# Patient Record
Sex: FEMALE | Race: BLACK OR AFRICAN AMERICAN | Employment: PART TIME | URBAN - METROPOLITAN AREA
[De-identification: names, ages, dates, MRNs, and addresses within clinical notes are randomized per-mention and may not be internally consistent; named-entity substitution may affect disease eponyms.]

---

## 2017-01-23 ENCOUNTER — APPOINTMENT (OUTPATIENT)
Dept: ULTRASOUND IMAGING | Age: 27
End: 2017-01-23
Attending: PHYSICIAN ASSISTANT
Payer: COMMERCIAL

## 2017-01-23 ENCOUNTER — HOSPITAL ENCOUNTER (EMERGENCY)
Age: 27
Discharge: HOME | End: 2017-01-24
Attending: EMERGENCY MEDICINE | Admitting: EMERGENCY MEDICINE
Payer: COMMERCIAL

## 2017-01-23 VITALS
RESPIRATION RATE: 18 BRPM | SYSTOLIC BLOOD PRESSURE: 121 MMHG | OXYGEN SATURATION: 100 % | HEART RATE: 62 BPM | BODY MASS INDEX: 30 KG/M2 | HEIGHT: 67 IN | WEIGHT: 191.14 LBS | DIASTOLIC BLOOD PRESSURE: 86 MMHG | TEMPERATURE: 98.9 F

## 2017-01-23 DIAGNOSIS — N76.0 BV (BACTERIAL VAGINOSIS): ICD-10-CM

## 2017-01-23 DIAGNOSIS — R10.2 PELVIC PAIN: Primary | ICD-10-CM

## 2017-01-23 DIAGNOSIS — B96.89 BV (BACTERIAL VAGINOSIS): ICD-10-CM

## 2017-01-23 DIAGNOSIS — R11.2 NON-INTRACTABLE VOMITING WITH NAUSEA, UNSPECIFIED VOMITING TYPE: ICD-10-CM

## 2017-01-23 LAB
ALBUMIN SERPL BCP-MCNC: 3.4 G/DL (ref 3.5–5)
ALBUMIN/GLOB SERPL: 1 {RATIO} (ref 1.1–2.2)
ALP SERPL-CCNC: 64 U/L (ref 45–117)
ALT SERPL-CCNC: 17 U/L (ref 12–78)
ANION GAP BLD CALC-SCNC: 6 MMOL/L (ref 5–15)
APPEARANCE UR: ABNORMAL
AST SERPL W P-5'-P-CCNC: 11 U/L (ref 15–37)
BACTERIA URNS QL MICRO: ABNORMAL /HPF
BILIRUB SERPL-MCNC: 0.6 MG/DL (ref 0.2–1)
BILIRUB UR QL: NEGATIVE
BUN SERPL-MCNC: 9 MG/DL (ref 6–20)
BUN/CREAT SERPL: 12 (ref 12–20)
CALCIUM SERPL-MCNC: 7.9 MG/DL (ref 8.5–10.1)
CHLORIDE SERPL-SCNC: 109 MMOL/L (ref 97–108)
CLUE CELLS VAG QL WET PREP: NORMAL
CO2 SERPL-SCNC: 28 MMOL/L (ref 21–32)
COLOR UR: ABNORMAL
CREAT SERPL-MCNC: 0.76 MG/DL (ref 0.55–1.02)
EPITH CASTS URNS QL MICRO: ABNORMAL /LPF
ERYTHROCYTE [DISTWIDTH] IN BLOOD BY AUTOMATED COUNT: 12.6 % (ref 11.5–14.5)
GLOBULIN SER CALC-MCNC: 3.3 G/DL (ref 2–4)
GLUCOSE SERPL-MCNC: 96 MG/DL (ref 65–100)
GLUCOSE UR STRIP.AUTO-MCNC: NEGATIVE MG/DL
HCG UR QL: NEGATIVE
HCT VFR BLD AUTO: 37.5 % (ref 35–47)
HGB BLD-MCNC: 12.2 G/DL (ref 11.5–16)
HGB UR QL STRIP: ABNORMAL
KETONES UR QL STRIP.AUTO: NEGATIVE MG/DL
KOH PREP SPEC: NORMAL
LEUKOCYTE ESTERASE UR QL STRIP.AUTO: ABNORMAL
MCH RBC QN AUTO: 27.5 PG (ref 26–34)
MCHC RBC AUTO-ENTMCNC: 32.5 G/DL (ref 30–36.5)
MCV RBC AUTO: 84.5 FL (ref 80–99)
MUCOUS THREADS URNS QL MICRO: ABNORMAL /LPF
NITRITE UR QL STRIP.AUTO: NEGATIVE
PH UR STRIP: 6.5 [PH] (ref 5–8)
PLATELET # BLD AUTO: 272 K/UL (ref 150–400)
POTASSIUM SERPL-SCNC: 3.8 MMOL/L (ref 3.5–5.1)
PROT SERPL-MCNC: 6.7 G/DL (ref 6.4–8.2)
PROT UR STRIP-MCNC: NEGATIVE MG/DL
RBC # BLD AUTO: 4.44 M/UL (ref 3.8–5.2)
RBC #/AREA URNS HPF: ABNORMAL /HPF (ref 0–5)
SERVICE CMNT-IMP: NORMAL
SODIUM SERPL-SCNC: 143 MMOL/L (ref 136–145)
SP GR UR REFRACTOMETRY: 1.02 (ref 1–1.03)
T VAGINALIS VAG QL WET PREP: NORMAL
UA: UC IF INDICATED,UAUC: ABNORMAL
UROBILINOGEN UR QL STRIP.AUTO: 1 EU/DL (ref 0.2–1)
WBC # BLD AUTO: 7.3 K/UL (ref 3.6–11)
WBC URNS QL MICRO: ABNORMAL /HPF (ref 0–4)

## 2017-01-23 PROCEDURE — 74011250637 HC RX REV CODE- 250/637: Performed by: PHYSICIAN ASSISTANT

## 2017-01-23 PROCEDURE — 36415 COLL VENOUS BLD VENIPUNCTURE: CPT | Performed by: EMERGENCY MEDICINE

## 2017-01-23 PROCEDURE — 87086 URINE CULTURE/COLONY COUNT: CPT | Performed by: EMERGENCY MEDICINE

## 2017-01-23 PROCEDURE — 87491 CHLMYD TRACH DNA AMP PROBE: CPT | Performed by: PHYSICIAN ASSISTANT

## 2017-01-23 PROCEDURE — 99284 EMERGENCY DEPT VISIT MOD MDM: CPT

## 2017-01-23 PROCEDURE — 81001 URINALYSIS AUTO W/SCOPE: CPT | Performed by: EMERGENCY MEDICINE

## 2017-01-23 PROCEDURE — 87210 SMEAR WET MOUNT SALINE/INK: CPT | Performed by: PHYSICIAN ASSISTANT

## 2017-01-23 PROCEDURE — 76856 US EXAM PELVIC COMPLETE: CPT

## 2017-01-23 PROCEDURE — 76830 TRANSVAGINAL US NON-OB: CPT

## 2017-01-23 PROCEDURE — 81025 URINE PREGNANCY TEST: CPT

## 2017-01-23 PROCEDURE — 85027 COMPLETE CBC AUTOMATED: CPT | Performed by: EMERGENCY MEDICINE

## 2017-01-23 PROCEDURE — 80053 COMPREHEN METABOLIC PANEL: CPT | Performed by: EMERGENCY MEDICINE

## 2017-01-23 RX ORDER — HYDROCODONE BITARTRATE AND ACETAMINOPHEN 7.5; 325 MG/1; MG/1
1 TABLET ORAL
Status: COMPLETED | OUTPATIENT
Start: 2017-01-23 | End: 2017-01-23

## 2017-01-23 RX ORDER — ONDANSETRON 4 MG/1
4 TABLET, ORALLY DISINTEGRATING ORAL
Status: COMPLETED | OUTPATIENT
Start: 2017-01-23 | End: 2017-01-23

## 2017-01-23 RX ADMIN — HYDROCODONE BITARTRATE AND ACETAMINOPHEN 1 TABLET: 7.5; 325 TABLET ORAL at 22:48

## 2017-01-23 RX ADMIN — ONDANSETRON 4 MG: 4 TABLET, ORALLY DISINTEGRATING ORAL at 22:48

## 2017-01-23 NOTE — LETTER
1/27/2017 Sofia Es [phone] [id number] Dear Ms. Jeffrey Napoles, You were recently seen in the Emergency Department of [address] [place] [address] and had lab work performed. We would like to discuss these results with you. Please call the Emergency Department at your earliest convenience at [phone] between 10am-8pm to speak with one of our providers. Sincerely, Kaylyn Pemberton, [address] 
 
 
[facility] EMERGENCY DEPT 
[address] [place] [address] 
[phone]

## 2017-01-24 RX ORDER — METRONIDAZOLE 500 MG/1
500 TABLET ORAL 2 TIMES DAILY
Qty: 14 TAB | Refills: 0 | Status: SHIPPED | OUTPATIENT
Start: 2017-01-24 | End: 2017-01-31

## 2017-01-24 RX ORDER — ONDANSETRON 4 MG/1
4 TABLET, ORALLY DISINTEGRATING ORAL
Qty: 10 TAB | Refills: 0 | Status: SHIPPED | OUTPATIENT
Start: 2017-01-24 | End: 2017-04-26

## 2017-01-24 NOTE — DISCHARGE INSTRUCTIONS
Bacterial Vaginosis: Care Instructions  Your Care Instructions    Bacterial vaginosis is a type of vaginal infection. It is caused by excess growth of certain bacteria that are normally found in the vagina. Symptoms can include itching, swelling, pain when you urinate or have sex, and a gray or yellow discharge with a \"fishy\" odor. It is not considered an infection that is spread through sexual contact. Although symptoms can be annoying and uncomfortable, bacterial vaginosis does not usually cause other health problems. However, if you have it while you are pregnant, it can cause complications. While the infection may go away on its own, most doctors use antibiotics to treat it. You may have been prescribed pills or vaginal cream. With treatment, bacterial vaginosis usually clears up in 5 to 7 days. Follow-up care is a key part of your treatment and safety. Be sure to make and go to all appointments, and call your doctor if you are having problems. It's also a good idea to know your test results and keep a list of the medicines you take. How can you care for yourself at home? · Take your antibiotics as directed. Do not stop taking them just because you feel better. You need to take the full course of antibiotics. · Do not eat or drink anything that contains alcohol if you are taking metronidazole (Flagyl). · Keep using your medicine if you start your period. Use pads instead of tampons while using a vaginal cream or suppository. Tampons can absorb the medicine. · Wear loose cotton clothing. Do not wear nylon and other materials that hold body heat and moisture close to the skin. · Do not scratch. Relieve itching with a cold pack or a cool bath. · Do not wash your vaginal area more than once a day. Use plain water or a mild, unscented soap. Do not douche. When should you call for help?   Watch closely for changes in your health, and be sure to contact your doctor if:  · You have unexpected vaginal bleeding. · You have a fever. · You have new or increased pain in your vagina or pelvis. · You are not getting better after 1 week. · Your symptoms return after you finish the course of your medicine. Where can you learn more? Go to http://rosemary-rose.info/. Jair Nell in the search box to learn more about \"Bacterial Vaginosis: Care Instructions. \"  Current as of: February 25, 2016  Content Version: 11.1  © 0962-5292 [employer]. Care instructions adapted under license by [email or web address] (which disclaims liability or warranty for this information). If you have questions about a medical condition or this instruction, always ask your healthcare professional. [address] any warranty or liability for your use of this information. Pelvic Pain: Care Instructions  Your Care Instructions    Pelvic pain, or pain in the lower belly, can have many causes. Often pelvic pain is not serious and gets better in a few days. If your pain continues or gets worse, you may need tests and treatment. Tell your doctor about any new symptoms. These may be signs of a serious problem. Follow-up care is a key part of your treatment and safety. Be sure to make and go to all appointments, and call your doctor if you are having problems. It's also a good idea to know your test results and keep a list of the medicines you take. How can you care for yourself at home? · Rest until you feel better. Lie down, and raise your legs by placing a pillow under your knees. · Drink plenty of fluids. You may find that small, frequent sips are easier on your stomach than if you drink a lot at once. Avoid drinks with carbonation or caffeine, such as soda pop, tea, or coffee. · Try eating several small meals instead of 2 or 3 large ones. Eat mild foods, such as rice, dry toast or crackers, bananas, and applesauce.  Avoid fatty and spicy foods, other fruits, and alcohol until 48 hours after your symptoms have gone away. · Take an over-the-counter pain medicine, such as acetaminophen (Tylenol), ibuprofen (Advil, Motrin), or naproxen (Aleve). Read and follow all instructions on the label. · Do not take two or more pain medicines at the same time unless the doctor told you to. Many pain medicines have acetaminophen, which is Tylenol. Too much acetaminophen (Tylenol) can be harmful. · You can put a heating pad, a warm cloth, or moist heat on your belly to relieve pain. When should you call for help? Call 911 anytime you think you may need emergency care. For example, call if:  · You passed out (lost consciousness). Call your doctor now or seek immediate medical care if:  · Your pain is getting worse. · Your pain becomes focused in one area of your belly. · You have severe vaginal bleeding. Severe means that you are soaking through your usual pads or tampons every hour for 2 or more hours and passing clots of blood. · You have new symptoms such as fever, urinary problems or unexpected vaginal bleeding. · You are dizzy or lightheaded, or you feel like you may faint. Watch closely for changes in your health, and be sure to contact your doctor if:  · You do not get better as expected. Where can you learn more? Go to http://rosemary-rose.info/. Enter [phone] in the search box to learn more about \"Pelvic Pain: Care Instructions. \"  Current as of: February 25, 2016  Content Version: 11.1  © 0776-3600 [employer]. Care instructions adapted under license by [employer] (which disclaims liability or warranty for this information). If you have questions about a medical condition or this instruction, always ask your healthcare professional. [address] any warranty or liability for your use of this information.

## 2017-01-24 NOTE — ED PROVIDER NOTES
HPI Comments: Alin Lara is a 27 y.o. female presents to emergency room ambulatory c/o pelvic pain, nausea with vomiting x 1 this morning. Denies upper abdominal pain, bowel or bladder symptoms, F/C, diarrhea, CP, SOB, vaginal discharge or odor, dysuria. Denies concerns for STI's. PCP: None    LMP- started 1/21, on presently    PMH- costochondritis  Social hx- no tobacco    The patient has no other complaints at this time. Written by Shanta Keller PA-C      The history is provided by the patient. Past Medical History:   Diagnosis Date    Costochondritis        Past Surgical History:   Procedure Laterality Date    Hx orthopaedic       Right fourth finger, \"Digit widgit\"         History reviewed. No pertinent family history. Social History     Social History    Marital status: SINGLE     Spouse name: N/A    Number of children: N/A    Years of education: N/A     Occupational History    Not on file. Social History Main Topics    Smoking status: Never Smoker    Smokeless tobacco: Not on file    Alcohol use Yes    Drug use: No    Sexual activity: No     Other Topics Concern    Not on file     Social History Narrative         ALLERGIES: Motrin [ibuprofen]    Review of Systems   Constitutional: Negative. Negative for activity change, chills, fatigue and unexpected weight change. Respiratory: Negative for cough, chest tightness, shortness of breath and wheezing. Cardiovascular: Negative. Negative for chest pain and palpitations. Gastrointestinal: Positive for nausea and vomiting. Negative for abdominal pain and diarrhea. Genitourinary: Negative. Negative for dysuria, flank pain, frequency and hematuria. Musculoskeletal: Negative. Negative for arthralgias, back pain, neck pain and neck stiffness. Skin: Negative. Negative for color change and rash. Neurological: Negative. Negative for dizziness, numbness and headaches. Psychiatric/Behavioral: Negative. Negative for confusion. All other systems reviewed and are negative. Patient Vitals for the past 12 hrs:   Temp Pulse Resp BP SpO2   01/23/17 1907 - 62 18 121/86 100 %   01/23/17 1758 98.9 °F (37.2 °C) (!) 57 16 127/85 100 %            Physical Exam   Constitutional: She is oriented to person, place, and time. She appears well-developed and well-nourished. She is active. Non-toxic appearance. No distress. AA female, looking at cell phone in no distress   HENT:   Head: Normocephalic and atraumatic. Eyes: Conjunctivae are normal. Pupils are equal, round, and reactive to light. Right eye exhibits no discharge. Left eye exhibits no discharge. Neck: Normal range of motion and full passive range of motion without pain. Neck supple. No tracheal tenderness present. Cardiovascular: Normal rate, regular rhythm, normal heart sounds, intact distal pulses and normal pulses. Exam reveals no gallop and no friction rub. No murmur heard. Pulmonary/Chest: Effort normal and breath sounds normal. No respiratory distress. She has no wheezes. She has no rales. She exhibits no tenderness. Abdominal: Soft. Bowel sounds are normal. She exhibits no distension. There is tenderness (suprapubic TTP; no lower quadrant TTP; no CVAT). There is no rebound and no guarding. Genitourinary:   Genitourinary Comments: Normal external genitalia. Vagina- pink, moist without lesion. Scant maroon colored blood in vaginal vault. Cervix- pink, round with a closed cervical os. Mild adnexal TTP with no CMT. No adnexal TTP or masses. Musculoskeletal: Normal range of motion. She exhibits no edema or tenderness. Neurological: She is alert and oriented to person, place, and time. She has normal strength. No cranial nerve deficit or sensory deficit. Coordination normal.   Skin: Skin is warm, dry and intact. No abrasion and no rash noted. She is not diaphoretic. No erythema. Psychiatric: She has a normal mood and affect.  Her speech is normal and behavior is normal. Cognition and memory are normal.   Nursing note and vitals reviewed. MDM  Number of Diagnoses or Management Options  Diagnosis management comments:   Ddx: STI, PID, BV, UTI, pregnancy       Amount and/or Complexity of Data Reviewed  Clinical lab tests: ordered and reviewed  Review and summarize past medical records: yes      ED Course       Procedures    Procedure Note - Pelvic Exam:    10:29 PM  Performed by: Catalina Wilson  Pelvic exam was performed using bimanual and speculum. Further findings noted in physical exam.   The procedure took 1-15 minutes, and pt tolerated well. Declines offer for empiric treatment against STDs at this time, will await cultures. Eddie Goodrich PA-C         DISCHARGE NOTE:  12:21 AM  The patient's results have been reviewed with them and/or available family. Patient and/or family verbally conveyed their understanding and agreement of the patient's signs, symptoms, diagnosis, treatment and prognosis and additionally agree to follow up as recommended in the discharge instructions or to return to the Emergency Room should their condition change prior to their follow-up appointment. The patient/family verbally agrees with the care-plan and verbally conveys that all of their questions have been answered. The discharge instructions have also been provided to the patient and/or family with some educational information regarding the patient's diagnosis as well a list of reasons why the patient would want to return to the ER prior to their follow-up appointment, should their condition change.   Written by Eddie Goodrich PA-C    LABS COMPLETED AND REVIEWED:  Recent Results (from the past 12 hour(s))   CBC W/O DIFF    Collection Time: 01/23/17  6:24 PM   Result Value Ref Range    WBC 7.3 3.6 - 11.0 K/uL    RBC 4.44 3.80 - 5.20 M/uL    HGB 12.2 11.5 - 16.0 g/dL    HCT 37.5 35.0 - 47.0 %    MCV 84.5 80.0 - 99.0 FL    MCH 27.5 26.0 - 34.0 PG MCHC 32.5 30.0 - 36.5 g/dL    RDW 12.6 11.5 - 14.5 %    PLATELET 791 966 - 510 K/uL   METABOLIC PANEL, COMPREHENSIVE    Collection Time: 01/23/17  6:24 PM   Result Value Ref Range    Sodium 143 136 - 145 mmol/L    Potassium 3.8 3.5 - 5.1 mmol/L    Chloride 109 (H) 97 - 108 mmol/L    CO2 28 21 - 32 mmol/L    Anion gap 6 5 - 15 mmol/L    Glucose 96 65 - 100 mg/dL    BUN 9 6 - 20 MG/DL    Creatinine 0.76 0.55 - 1.02 MG/DL    BUN/Creatinine ratio 12 12 - 20      GFR est AA >60 >60 ml/min/1.73m2    GFR est non-AA >60 >60 ml/min/1.73m2    Calcium 7.9 (L) 8.5 - 10.1 MG/DL    Bilirubin, total 0.6 0.2 - 1.0 MG/DL    ALT 17 12 - 78 U/L    AST 11 (L) 15 - 37 U/L    Alk.  phosphatase 64 45 - 117 U/L    Protein, total 6.7 6.4 - 8.2 g/dL    Albumin 3.4 (L) 3.5 - 5.0 g/dL    Globulin 3.3 2.0 - 4.0 g/dL    A-G Ratio 1.0 (L) 1.1 - 2.2     URINALYSIS W/ REFLEX CULTURE    Collection Time: 01/23/17  9:34 PM   Result Value Ref Range    Color YELLOW/STRAW      Appearance CLOUDY (A) CLEAR      Specific gravity 1.023 1.003 - 1.030      pH (UA) 6.5 5.0 - 8.0      Protein NEGATIVE  NEG mg/dL    Glucose NEGATIVE  NEG mg/dL    Ketone NEGATIVE  NEG mg/dL    Bilirubin NEGATIVE  NEG      Blood LARGE (A) NEG      Urobilinogen 1.0 0.2 - 1.0 EU/dL    Nitrites NEGATIVE  NEG      Leukocyte Esterase SMALL (A) NEG      WBC 5-10 0 - 4 /hpf    RBC 5-10 0 - 5 /hpf    Epithelial cells MANY (A) FEW /lpf    Bacteria 1+ (A) NEG /hpf    UA:UC IF INDICATED URINE CULTURE ORDERED (A) CNI      Mucus TRACE (A) NEG /lpf   HCG URINE, QL. - POC    Collection Time: 01/23/17  9:38 PM   Result Value Ref Range    Pregnancy test,urine (POC) NEGATIVE  NEG     KOH, OTHER SOURCES    Collection Time: 01/23/17 10:20 PM   Result Value Ref Range    Special Requests: NO SPECIAL REQUESTS      KOH NO YEAST SEEN     WET PREP    Collection Time: 01/23/17 10:30 PM   Result Value Ref Range    Clue cells CLUE CELLS PRESENT      Wet prep NO TRICHOMONAS SEEN         IMAGING COMPLETED AND REVIEWED:  US PELV NON OBS   US TRANSVAGINAL   Final Result   INDICATION: vomiting; r/o torsion, eval for cyst      PELVIC SONOGRAM is performed through the unremarkable urinary bladder.     The uterus is normal in size, contour and echotexture with no evidence for  fibroids. Uterus measures approximately 8.2 x 5.2 x 3.3 cm. Endometrial stripe  is not significantly thickened, measuring 6 mm by this technique.     Ovaries are obscured by bowel gas. There is no significant free fluid in the  pelvic cul-de-sac.     TRANSVAGINAL SONOGRAM is performed following Pelvic Sonogram in order to more  accurately measure the endometrial thickness and to more adequately delineate  ovarian anatomy.     The uterus is normal in echotexture with no evidence for fibroids. Endometrial  stripe measures 2mm.      Ovaries are normal in appearance and vascularity by transvaginal sonography with  no adnexal dominant cyst or solid mass identified. Right ovary measures 3.6 x  2.8 x 2.5cm and left ovary 3.6 x 2.2 x 2.0cm.      IMPRESSION  IMPRESSION: Normal Pelvic and Transvaginal Sonograms. CLINICAL IMPRESSION:  1. Pelvic pain    2. Non-intractable vomiting with nausea, unspecified vomiting type    3. BV (bacterial vaginosis)        Plan:  1. Rx Flagyl, Zofran ODT  2.  Pelvic rest encouraged  3.   4. Return to ER if worse

## 2017-01-25 LAB
BACTERIA SPEC CULT: NORMAL
C TRACH DNA SPEC QL NAA+PROBE: POSITIVE
CC UR VC: NORMAL
N GONORRHOEA DNA SPEC QL NAA+PROBE: NEGATIVE
SAMPLE TYPE: ABNORMAL
SERVICE CMNT-IMP: ABNORMAL
SERVICE CMNT-IMP: NORMAL
SPECIMEN SOURCE: ABNORMAL

## 2017-01-25 NOTE — PROGRESS NOTES
Patient not treated while in ED. Attempted to contact patient but unable to hear patient on phone and unable to leave message. Needs another call.

## 2017-01-25 NOTE — PROGRESS NOTES
Attempted to call patient again today and was able to leave a VM instructing her to call back without revealing any private health identifiers. Did not receive treatment for Chlamydia while in [id number] [facility] ED.   Franssico De León PA-C

## 2017-01-30 NOTE — PROGRESS NOTES
Patient called back to the ED, verified SSN. Discussed results. Patient can not afford abx, request to return to ED for treatment.    Jose Samayoa, [address]

## 2017-04-26 ENCOUNTER — HOSPITAL ENCOUNTER (EMERGENCY)
Age: 27
Discharge: HOME | End: 2017-04-26
Attending: EMERGENCY MEDICINE
Payer: COMMERCIAL

## 2017-04-26 VITALS
DIASTOLIC BLOOD PRESSURE: 73 MMHG | SYSTOLIC BLOOD PRESSURE: 115 MMHG | TEMPERATURE: 98.4 F | HEART RATE: 71 BPM | BODY MASS INDEX: 31.11 KG/M2 | WEIGHT: 198.19 LBS | OXYGEN SATURATION: 100 % | RESPIRATION RATE: 18 BRPM | HEIGHT: 67 IN

## 2017-04-26 DIAGNOSIS — N76.0 BACTERIAL VAGINOSIS: ICD-10-CM

## 2017-04-26 DIAGNOSIS — Z20.2 STD EXPOSURE: Primary | ICD-10-CM

## 2017-04-26 DIAGNOSIS — B96.89 BACTERIAL VAGINOSIS: ICD-10-CM

## 2017-04-26 LAB
APPEARANCE UR: CLEAR
BACTERIA URNS QL MICRO: NEGATIVE /HPF
BILIRUB UR QL: NEGATIVE
CLUE CELLS VAG QL WET PREP: NORMAL
COLOR UR: ABNORMAL
EPITH CASTS URNS QL MICRO: ABNORMAL /LPF
GLUCOSE UR STRIP.AUTO-MCNC: NEGATIVE MG/DL
HCG UR QL: NEGATIVE
HGB UR QL STRIP: NEGATIVE
KETONES UR QL STRIP.AUTO: NEGATIVE MG/DL
KOH PREP SPEC: NORMAL
LEUKOCYTE ESTERASE UR QL STRIP.AUTO: ABNORMAL
NITRITE UR QL STRIP.AUTO: NEGATIVE
PH UR STRIP: 5.5 [PH] (ref 5–8)
PROT UR STRIP-MCNC: NEGATIVE MG/DL
RBC #/AREA URNS HPF: ABNORMAL /HPF (ref 0–5)
SERVICE CMNT-IMP: NORMAL
SP GR UR REFRACTOMETRY: 1.02 (ref 1–1.03)
T VAGINALIS VAG QL WET PREP: NORMAL
UROBILINOGEN UR QL STRIP.AUTO: 0.2 EU/DL (ref 0.2–1)
WBC URNS QL MICRO: ABNORMAL /HPF (ref 0–4)

## 2017-04-26 PROCEDURE — 87210 SMEAR WET MOUNT SALINE/INK: CPT | Performed by: EMERGENCY MEDICINE

## 2017-04-26 PROCEDURE — 74011250637 HC RX REV CODE- 250/637: Performed by: EMERGENCY MEDICINE

## 2017-04-26 PROCEDURE — 81001 URINALYSIS AUTO W/SCOPE: CPT | Performed by: EMERGENCY MEDICINE

## 2017-04-26 PROCEDURE — 74011000250 HC RX REV CODE- 250: Performed by: EMERGENCY MEDICINE

## 2017-04-26 PROCEDURE — 99284 EMERGENCY DEPT VISIT MOD MDM: CPT

## 2017-04-26 PROCEDURE — 96372 THER/PROPH/DIAG INJ SC/IM: CPT

## 2017-04-26 PROCEDURE — 87491 CHLMYD TRACH DNA AMP PROBE: CPT | Performed by: EMERGENCY MEDICINE

## 2017-04-26 PROCEDURE — 81025 URINE PREGNANCY TEST: CPT

## 2017-04-26 PROCEDURE — 74011250636 HC RX REV CODE- 250/636: Performed by: EMERGENCY MEDICINE

## 2017-04-26 RX ORDER — METRONIDAZOLE 500 MG/1
500 TABLET ORAL 2 TIMES DAILY
Qty: 14 TAB | Refills: 0 | Status: SHIPPED | OUTPATIENT
Start: 2017-04-26 | End: 2017-05-03

## 2017-04-26 RX ORDER — AZITHROMYCIN 250 MG/1
1000 TABLET, FILM COATED ORAL
Status: COMPLETED | OUTPATIENT
Start: 2017-04-26 | End: 2017-04-26

## 2017-04-26 RX ORDER — CEFTRIAXONE 250 MG/8ML
250 INJECTION, POWDER, FOR SOLUTION INTRAMUSCULAR; INTRAVENOUS
Status: DISCONTINUED | OUTPATIENT
Start: 2017-04-26 | End: 2017-04-26

## 2017-04-26 RX ADMIN — AZITHROMYCIN 1000 MG: 250 TABLET, FILM COATED ORAL at 07:37

## 2017-04-26 RX ADMIN — LIDOCAINE HYDROCHLORIDE 250 MG: 10 INJECTION, SOLUTION EPIDURAL; INFILTRATION; INTRACAUDAL; PERINEURAL at 07:38

## 2017-04-26 NOTE — DISCHARGE INSTRUCTIONS
Learning About Safer Sex for Teens  What is safer sex? Safer sex is a way to help you avoid an infection spread through sex. It can also help prevent pregnancy. It may seems strange or uncomfortable to talk about sex. But the more you know, the safer you are. And the actions you take before sex can help keep you from getting an infection like herpes or a deadly infection like HIV. You can get a sexually transmitted infection (STI) from any kind of sexual contact, not just intercourse. STIs are spread through skin-to-skin contact between the genitals. You can also get an STI from contact with body fluids such as semen, vaginal fluids, and blood (including menstrual blood). This means you can get an STI from vaginal sex, anal sex, or oral sex. You may have heard this before, but not having sex at all is still the best way to prevent pregnancy and any STI. How can you protect yourself from STIs? A condom is one of the best ways to lower your chance of STIs. You may know about condoms for men. Did you know there are condoms for women too? The female condom is a tube of soft plastic with a closed end that is put deep into the vagina. · Use condoms or female condoms each time and every time you have sex. ¨ Condoms come in several sizes. Make sure you use the right size. A condom that is too small can break easily. A condom that is too big can slip off during sex. Use a new condom each time you have sex. ¨ Be careful not to poke a hole in the condom when you open the wrapper. ¨ Never use petroleum jelly (such as Vaseline), grease, hand lotion, baby oil, or anything with oil in it. These products can make holes in the condom. ¨ After sex, hold the condom on your penis as you remove your penis from your partner. This will keep semen from spilling out of the condom. · Do not use a female condom and male condom at the same time.   · Do not have sex with anyone who has symptoms of an STI, such as sores on the genitals or mouth. The herpes virus that causes cold sores can spread to and from the penis and vagina. · Think about getting shots to prevent hepatitis A and hepatitis B, if you haven't already had these shots. You can get both of these diseases through sex. A dental dam is a special rubber sheet that you can use for protection during oral sex. How can you prevent pregnancy? Remember that birth control methods such as diaphragms, IUDs, foams, and birth control pills do not stop you from getting STIs. These are some safer sex things you can do to help avoid pregnancy:  · Use some type of birth control every time you have sex. · Don't drink a lot of alcohol or use drugs before sex. This can cause you to let down your guard. And then you're not thinking clearly about safer sex. How else can you take care of yourself? · Talk to your partner before you have sex. Find out if he or she has or is at risk for any STI. Keep in mind that a person may be able to spread an STI even if he or she does not have symptoms. You and your partner may want to get an HIV test. You should get tested again 6 months later. · You should never feel pressured to have sex. It's okay to say \"no\" anytime you want to stop. · It's important to feel safe with your sex partner and with the activities you are doing together. If you don't feel safe, talk with an adult you trust.  Follow-up care is a key part of your treatment and safety. Be sure to make and go to all appointments, and call your doctor if you are having problems. It's also a good idea to know your test results and keep a list of the medicines you take. Where can you learn more? Go to http://rosemary-rose.info/. Enter P218 in the search box to learn more about \"Learning About Safer Sex for Teens. \"  Current as of: May 27, 2016  Content Version: 11.2  © 2476-0400 [employer].  Care instructions adapted under license by [employer] (which disclaims liability or warranty for this information). If you have questions about a medical condition or this instruction, always ask your healthcare professional. [address] any warranty or liability for your use of this information.

## 2017-04-26 NOTE — ED NOTES
Pt reports lower abdominal pain beginning last week, \"I think it was form my period, but my period never came, then my partner told me that he was positive for chlamydia, after he wasn't feeling too well. \"

## 2017-04-26 NOTE — ED NOTES
MD has reviewed discharge instructions with the patient. The patient verbalized understanding. Patient discharged home ambulatory.

## 2017-04-26 NOTE — ED PROVIDER NOTES
HPI Comments: Yamel Moscoso is a 27 y.o. female who presents ambulatory to [address] [facility] ED with concern for exposure to STD. Pt reports she was called by her partner this morning and informed that he tested positive for chlamydia, and that she should report for testing. She denies hx of STD. Pt denies recently having intercourse with any other partners. She specifically denies any vaginal discharge, dysuria, fevers, chills, nausea, vomiting, chest pain, shortness of breath, headache, rash, diarrhea, sweating or weight loss. Pt reports tobacco use, but denies alcohol and drug use. PCP: None    There are no other complaints, changes, or physical findings at this time. The history is provided by the patient. Past Medical History:   Diagnosis Date    Costochondritis        Past Surgical History:   Procedure Laterality Date    HX ORTHOPAEDIC      Right fourth finger, \"Digit widgit\"         Family History:   Problem Relation Age of Onset    Family history unknown: Yes       Social History     Social History    Marital status: SINGLE     Spouse name: N/A    Number of children: N/A    Years of education: N/A     Occupational History    Not on file. Social History Main Topics    Smoking status: Current Some Day Smoker    Smokeless tobacco: Not on file      Comment: \"only when I drink\"    Alcohol use Yes      Comment: Occasionally    Drug use: No    Sexual activity: No     Other Topics Concern    Not on file     Social History Narrative         ALLERGIES: Motrin [ibuprofen]    Review of Systems   Constitutional: Negative. Negative for activity change, appetite change, chills, fatigue, fever and unexpected weight change. HENT: Negative. Negative for congestion, hearing loss, rhinorrhea, sneezing and voice change. Eyes: Negative. Negative for pain and visual disturbance. Respiratory: Negative. Negative for apnea, cough, choking, chest tightness and shortness of breath.     Cardiovascular: Negative. Negative for chest pain and palpitations. Gastrointestinal: Negative. Negative for abdominal distention, abdominal pain, blood in stool, diarrhea, nausea and vomiting. Genitourinary: Negative. Negative for difficulty urinating, dysuria, flank pain, frequency, urgency and vaginal discharge. +possible STD exposure   Musculoskeletal: Negative. Negative for arthralgias, back pain, myalgias and neck stiffness. Skin: Negative. Negative for color change and rash. Neurological: Negative. Negative for dizziness, seizures, syncope, speech difficulty, weakness, numbness and headaches. Hematological: Negative for adenopathy. Psychiatric/Behavioral: Negative. Negative for agitation, behavioral problems, dysphoric mood and suicidal ideas. The patient is not nervous/anxious. Physical Exam   Constitutional: She is oriented to person, place, and time. She appears well-developed and well-nourished. No distress. HENT:   Head: Normocephalic and atraumatic. Mouth/Throat: Oropharynx is clear and moist. No oropharyngeal exudate. Eyes: Conjunctivae and EOM are normal. Pupils are equal, round, and reactive to light. Right eye exhibits no discharge. Left eye exhibits no discharge. Neck: Normal range of motion. Neck supple. Cardiovascular: Normal rate, regular rhythm and intact distal pulses. Exam reveals no gallop and no friction rub. No murmur heard. Pulmonary/Chest: Effort normal and breath sounds normal. No respiratory distress. She has no wheezes. She has no rales. She exhibits no tenderness. Abdominal: Soft. Bowel sounds are normal. She exhibits no distension and no mass. There is no tenderness. There is no rebound and no guarding. Musculoskeletal: Normal range of motion. She exhibits no edema. Lymphadenopathy:     She has no cervical adenopathy. Neurological: She is alert and oriented to person, place, and time. No cranial nerve deficit.  Coordination normal.   Skin: Skin is warm and dry. No rash noted. No erythema. Psychiatric: She has a normal mood and affect. Nursing note and vitals reviewed. MDM  Number of Diagnoses or Management Options  Diagnosis management comments: DDx: STD exposure       Amount and/or Complexity of Data Reviewed  Clinical lab tests: ordered and reviewed  Review and summarize past medical records: yes      ED Course       Procedures    Chief Complaint   Patient presents with    Exposure to STD     partner tested positive for chlamydia       6:25 AM  The patients presenting problems have been discussed, and they are in agreement with the care plan formulated and outlined with them. I have encouraged them to ask questions as they arise throughout their visit. MEDICATIONS GIVEN:  Medications   azithromycin (ZITHROMAX) tablet 1,000 mg (1,000 mg Oral Given 4/26/17 0737)   cefTRIAXone (ROCEPHIN) 250 mg in lidocaine (PF) (XYLOCAINE) 10 mg/mL (1 %) IM injection (250 mg IntraMUSCular Given 4/26/17 0738)       LABS REVIEWED:  No results found for this or any previous visit (from the past 24 hour(s)). VITAL SIGNS:  No data found. PROGRESS NOTES:    8:32 AM  The patient states that their symptoms have resolved and they feel much better. There are no other new complaints at this time. Her questions have been answered. We are awaiting final results and those will be reviewed with them when they become available. 8:36 AM  Pt left without receiving her paperwork, will mail discharge instructions to address on file. DIAGNOSIS:    1. STD exposure    2.  Bacterial vaginosis        PLAN:  Follow-up Information     Follow up With Details Comments Contact Info    None   None ([phone]) Patient stated that they have no PCP      [facility] EMERGENCY DEPT Call in 2 days As needed, If symptoms worsen [address] [place] [address]  [phone]        Discharge Medication List as of 4/26/2017  8:28 AM          ED COURSE: The [facility] course has been uncomplicated. 8:32 AM  Luann Childers's  results have been reviewed with her. She has been counseled regarding her diagnosis. She verbally conveys understanding and agreement of the signs, symptoms, diagnosis, treatment and prognosis and additionally agrees to follow up as recommended with Dr. None in 24 - 48 hours. She also agrees with the care-plan and conveys that all of her questions have been answered. I have also put together some discharge instructions for her that include: 1) educational information regarding their diagnosis, 2) how to care for their diagnosis at home, as well a 3) list of reasons why they would want to return to the ED prior to their follow-up appointment, should their condition change. This note is prepared by Emilia Woodard, acting as Scribe for Nevin Osborne. Jayson Coyne, [address] [facility] Jayson Coyne MD: The scribe's documentation has been prepared under my direction and personally reviewed by me in its entirety. I confirm that the note above accurately reflects all work, treatment, procedures, and medical decision making performed by me.

## 2017-04-26 NOTE — ED NOTES
Bedside and Verbal shift change report given to Hardeep Veliz (oncoming nurse) by Bobby Patel (offgoing nurse). Report included the following information SBAR, ED Summary, Intake/Output, MAR and Recent Results.

## 2017-04-28 LAB
C TRACH DNA SPEC QL NAA+PROBE: POSITIVE
N GONORRHOEA DNA SPEC QL NAA+PROBE: NEGATIVE
SAMPLE TYPE: ABNORMAL
SERVICE CMNT-IMP: ABNORMAL
SPECIMEN SOURCE: ABNORMAL

## 2017-04-28 NOTE — PROGRESS NOTES
Advised patient of her +Chlamydia dx. Pt was treated in the ED and advised by her partner to be evaluated.

## 2017-08-29 ENCOUNTER — HOSPITAL ENCOUNTER (EMERGENCY)
Age: 27
Discharge: HOME | End: 2017-08-29
Attending: EMERGENCY MEDICINE
Payer: COMMERCIAL

## 2017-08-29 VITALS
DIASTOLIC BLOOD PRESSURE: 76 MMHG | SYSTOLIC BLOOD PRESSURE: 132 MMHG | TEMPERATURE: 99 F | HEIGHT: 67 IN | RESPIRATION RATE: 18 BRPM | HEART RATE: 69 BPM | WEIGHT: 188 LBS | OXYGEN SATURATION: 98 % | BODY MASS INDEX: 29.51 KG/M2

## 2017-08-29 DIAGNOSIS — M54.41 ACUTE RIGHT-SIDED LOW BACK PAIN WITH RIGHT-SIDED SCIATICA: Primary | ICD-10-CM

## 2017-08-29 PROCEDURE — 99282 EMERGENCY DEPT VISIT SF MDM: CPT

## 2017-08-29 RX ORDER — CYCLOBENZAPRINE HCL 10 MG
10 TABLET ORAL
Qty: 15 TAB | Refills: 0 | Status: SHIPPED | OUTPATIENT
Start: 2017-08-29 | End: 2018-12-09

## 2017-08-29 RX ORDER — PREDNISONE 10 MG/1
TABLET ORAL
Qty: 21 TAB | Refills: 0 | Status: SHIPPED | OUTPATIENT
Start: 2017-08-29 | End: 2018-12-09

## 2017-08-29 NOTE — ED NOTES
Emergency Department Nursing Plan of Care       The Nursing Plan of Care is developed from the Nursing assessment and Emergency Department Attending provider initial evaluation. The plan of care may be reviewed in the ED Provider note. The Plan of Care was developed with the following considerations:   Patient / Family readiness to learn indicated by:verbalized understanding  Persons(s) to be included in education: patient  Barriers to Learning/Limitations:No    Signed     Hailey aMrtines    8/29/2017   6:56 PM    See nursing assessment    Patient is alert and oriented x 4 and in no acute distress at this time. Respirations are at a regular rate, depth, and pattern. Patient updated on plan of care and has no questions or concerns at this time.

## 2017-08-29 NOTE — LETTER
[address] 
[facility] EMERGENCY DEPT 
[address] [place] [address] [facility] [address] [id number] 
[phone] Work/School Note Date: 8/29/2017 To Whom It May concern: 
 
Miquel Woo was seen and treated today in the emergency room by the following provider(s): 
Attending Provider: Melissa Segura MD 
Physician Assistant: LISA Singh. Ms. Michelle Salomon was seen in the Emergency Department today for medical concerns of the past few days. Miquel Woo may return to work on September 1, 2017. Sincerely, LISA Singh

## 2017-08-29 NOTE — ED PROVIDER NOTES
HPI     To ED with complaints of lower back pain and some R leg pain. Notes some shooting pain that starts about ankle and radiates to front of leg and then around to and across lower back. Pain is worse with prolonged sitting and certain bending / twisting movement. No relief with tylenol. Pain has been intermittent for past few days. Moderate pain, aching and shooting. No associated numbness/tingling. No bowel/bladder dysfunction. No other aggravating/ alleviating sx noted. No fevers, no chills. Past Medical History:   Diagnosis Date    Costochondritis        Past Surgical History:   Procedure Laterality Date    HX ORTHOPAEDIC      Right fourth finger, \"Digit widgit\"         Family History:   Problem Relation Age of Onset    Family history unknown: Yes       Social History     Social History    Marital status: SINGLE     Spouse name: N/A    Number of children: N/A    Years of education: N/A     Occupational History    Not on file. Social History Main Topics    Smoking status: Current Some Day Smoker    Smokeless tobacco: Not on file      Comment: \"only when I drink\"    Alcohol use Yes      Comment: Occasionally    Drug use: No    Sexual activity: No     Other Topics Concern    Not on file     Social History Narrative         ALLERGIES: Motrin [ibuprofen]    Review of Systems   Constitutional: Negative for chills and fever. HENT: Negative for congestion, rhinorrhea and sore throat. Respiratory: Negative for cough and shortness of breath. Cardiovascular: Negative for chest pain. Gastrointestinal: Negative for abdominal pain, nausea and vomiting. Genitourinary: Negative for dysuria, frequency and urgency. Musculoskeletal: Negative for back pain and neck pain. Skin: Negative for rash and wound. Neurological: Negative for seizures, syncope, numbness and headaches. Psychiatric/Behavioral: Negative for confusion. The patient is not nervous/anxious.     All other systems reviewed and are negative. Vitals:    08/29/17 1822   BP: 132/76   Pulse: 69   Resp: 18   Temp: 99 °F (37.2 °C)   SpO2: 98%   Weight: 85.3 kg (188 lb)   Height: 5' 7\" (1.702 m)            Physical Exam   Constitutional: She is oriented to person, place, and time. She appears well-developed and well-nourished. HENT:   Head: Normocephalic. Mouth/Throat: Oropharynx is clear and moist.   Eyes: Pupils are equal, round, and reactive to light. Neck: Normal range of motion. Neck supple. Cardiovascular: Normal rate. Exam reveals no gallop and no friction rub. No murmur heard. Pulmonary/Chest: Effort normal. She has no wheezes. She has no rales. Abdominal: Soft. There is no tenderness. There is no rebound. Musculoskeletal:   No T-spine or L-Spine vertebral body tenderness. Patella reflexes 1+B. Seated SLR negative B. Sensation throughout BLE intact. Pedal pulses palpable B. Ambulating in ED without difficulty. Tenderness over Bilateral lumbar paravertebral muscles. Lymphadenopathy:     She has no cervical adenopathy. Neurological: She is alert and oriented to person, place, and time. Skin: Skin is warm and dry. Psychiatric: She has a normal mood and affect. Her behavior is normal.   Nursing note and vitals reviewed. MDM  Number of Diagnoses or Management Options  Acute right-sided low back pain with right-sided sciatica:   Diagnosis management comments: DDX: sciatica, DDD, DJD, lumbar strain    ED Course       Procedures        LABORATORY TESTS:  No results found for this or any previous visit (from the past 12 hour(s)). IMAGING RESULTS:  No orders to display       MEDICATIONS GIVEN:  Medications - No data to display    IMPRESSION:  1. Acute right-sided low back pain with right-sided sciatica        PLAN:  1.    Current Discharge Medication List      START taking these medications    Details   predniSONE (STERAPRED DS) 10 mg dose pack Take as directed  Qty: 21 Tab, Refills: 0      cyclobenzaprine (FLEXERIL) 10 mg tablet Take 1 Tab by mouth three (3) times daily as needed for Muscle Spasm(s). Qty: 15 Tab, Refills: 0           2.    Follow-up Information     None        Return to ED if worse

## 2017-08-29 NOTE — DISCHARGE INSTRUCTIONS
Back Pain: Care Instructions  Your Care Instructions    Back pain has many possible causes. It is often related to problems with muscles and ligaments of the back. It may also be related to problems with the nerves, discs, or bones of the back. Moving, lifting, standing, sitting, or sleeping in an awkward way can strain the back. Sometimes you don't notice the injury until later. Arthritis is another common cause of back pain. Although it may hurt a lot, back pain usually improves on its own within several weeks. Most people recover in 12 weeks or less. Using good home treatment and being careful not to stress your back can help you feel better sooner. Follow-up care is a key part of your treatment and safety. Be sure to make and go to all appointments, and call your doctor if you are having problems. Its also a good idea to know your test results and keep a list of the medicines you take. How can you care for yourself at home? · Sit or lie in positions that are most comfortable and reduce your pain. Try one of these positions when you lie down:  ¨ Lie on your back with your knees bent and supported by large pillows. ¨ Lie on the floor with your legs on the seat of a sofa or chair. Meseret Barbour on your side with your knees and hips bent and a pillow between your legs. ¨ Lie on your stomach if it does not make pain worse. · Do not sit up in bed, and avoid soft couches and twisted positions. Bed rest can help relieve pain at first, but it delays healing. Avoid bed rest after the first day of back pain. · Change positions every 30 minutes. If you must sit for long periods of time, take breaks from sitting. Get up and walk around, or lie in a comfortable position. · Try using a heating pad on a low or medium setting for 15 to 20 minutes every 2 or 3 hours. Try a warm shower in place of one session with the heating pad. · You can also try an ice pack for 10 to 15 minutes every 2 to 3 hours.  Put a thin cloth between the ice pack and your skin. · Take pain medicines exactly as directed. ¨ If the doctor gave you a prescription medicine for pain, take it as prescribed. ¨ If you are not taking a prescription pain medicine, ask your doctor if you can take an over-the-counter medicine. · Take short walks several times a day. You can start with 5 to 10 minutes, 3 or 4 times a day, and work up to longer walks. Walk on level surfaces and avoid hills and stairs until your back is better. · Return to work and other activities as soon as you can. Continued rest without activity is usually not good for your back. · To prevent future back pain, do exercises to stretch and strengthen your back and stomach. Learn how to use good posture, safe lifting techniques, and proper body mechanics. When should you call for help? Call your doctor now or seek immediate medical care if:  · You have new or worsening numbness in your legs. · You have new or worsening weakness in your legs. (This could make it hard to stand up.)  · You lose control of your bladder or bowels. Watch closely for changes in your health, and be sure to contact your doctor if:  · Your pain gets worse. · You are not getting better after 2 weeks. Where can you learn more? Go to http://rosemary-rose.info/. Enter B989 in the search box to learn more about \"Back Pain: Care Instructions. \"  Current as of: March 21, 2017  Content Version: 11.3  © 0690-9149 [employer]. Care instructions adapted under license by [employer] (which disclaims liability or warranty for this information). If you have questions about a medical condition or this instruction, always ask your healthcare professional. [place] [address] any warranty or liability for your use of this information.        Sciatica: Care Instructions  Your Care Instructions    Sciatica (say \"ewv-HT-cb-kuh\") is an irritation of one of the sciatic nerves, which come from the spinal cord in the lower back. The sciatic nerves and their branches extend down through the buttock to the foot. Sciatica can develop when an injured disc in the back presses against a spinal nerve root. Its main symptom is pain, numbness, or weakness that is often worse in the leg or foot than in the back. Sciatica often will improve and go away with time. Early treatment usually includes medicines and exercises to relieve pain. Follow-up care is a key part of your treatment and safety. Be sure to make and go to all appointments, and call your doctor if you are having problems. It's also a good idea to know your test results and keep a list of the medicines you take. How can you care for yourself at home? · Take pain medicines exactly as directed. ¨ If the doctor gave you a prescription medicine for pain, take it as prescribed. ¨ If you are not taking a prescription pain medicine, ask your doctor if you can take an over-the-counter medicine. · Use heat or ice to relieve pain. ¨ To apply heat, put a warm water bottle, heating pad set on low, or warm cloth on your back. Do not go to sleep with a heating pad on your skin. ¨ To use ice, put ice or a cold pack on the area for 10 to 20 minutes at a time. Put a thin cloth between the ice and your skin. · Avoid sitting if possible, unless it feels better than standing. · Alternate lying down with short walks. Increase your walking distance as you are able to without making your symptoms worse. · Do not do anything that makes your symptoms worse. When should you call for help? Call 911 anytime you think you may need emergency care. For example, call if:  · You are unable to move a leg at all. Call your doctor now or seek immediate medical care if:  · You have new or worse symptoms in your legs or buttocks. Symptoms may include:  ¨ Numbness or tingling. ¨ Weakness. ¨ Pain. · You lose bladder or bowel control.   Watch closely for changes in your health, and be sure to contact your doctor if:  · You are not getting better as expected. Where can you learn more? Go to http://rosemary-rose.info/. Enter [phone] in the search box to learn more about \"Sciatica: Care Instructions. \"  Current as of: March 21, 2017  Content Version: 11.3  © 2135-1528 [employer]. Care instructions adapted under license by [employer] (which disclaims liability or warranty for this information). If you have questions about a medical condition or this instruction, always ask your healthcare professional. [place] [address] any warranty or liability for your use of this information.

## 2017-08-30 NOTE — ED NOTES
Discharge instructions were given to the patient by Donna Macedo RN. The patient left the Emergency Department ambulatory, alert and oriented and in no acute distress with 2 prescription(s). The patient was encouraged to call or return to the ED for worsening symptoms or problems and was encouraged to schedule a follow up appointment for continuing care. Patient leaving ED accompanied by self. The patient verbalized understanding of discharge instructions and prescriptions, all questions were answered. The patient has no further concerns at this time. Patient refused wheelchair transfer upon ED discharge.

## 2018-12-09 ENCOUNTER — APPOINTMENT (OUTPATIENT)
Dept: GENERAL RADIOLOGY | Age: 28
End: 2018-12-09
Attending: EMERGENCY MEDICINE
Payer: COMMERCIAL

## 2018-12-09 ENCOUNTER — HOSPITAL ENCOUNTER (EMERGENCY)
Age: 28
Discharge: HOME | End: 2018-12-10
Attending: EMERGENCY MEDICINE
Payer: COMMERCIAL

## 2018-12-09 VITALS
RESPIRATION RATE: 18 BRPM | WEIGHT: 194 LBS | DIASTOLIC BLOOD PRESSURE: 76 MMHG | HEART RATE: 98 BPM | TEMPERATURE: 98.2 F | BODY MASS INDEX: 30.45 KG/M2 | SYSTOLIC BLOOD PRESSURE: 121 MMHG | OXYGEN SATURATION: 100 % | HEIGHT: 67 IN

## 2018-12-09 DIAGNOSIS — J20.9 ACUTE BRONCHITIS, UNSPECIFIED ORGANISM: Primary | ICD-10-CM

## 2018-12-09 DIAGNOSIS — Z34.91 PREGNANT AND NOT YET DELIVERED IN FIRST TRIMESTER: ICD-10-CM

## 2018-12-09 DIAGNOSIS — R07.89 CHEST WALL PAIN: ICD-10-CM

## 2018-12-09 LAB
D DIMER PPP FEU-MCNC: 0.28 MG/L FEU (ref 0–0.65)
HCG UR QL: NEGATIVE
HCG UR QL: POSITIVE
TROPONIN I BLD-MCNC: <0.04 NG/ML (ref 0–0.08)

## 2018-12-09 PROCEDURE — 85379 FIBRIN DEGRADATION QUANT: CPT

## 2018-12-09 PROCEDURE — 93005 ELECTROCARDIOGRAM TRACING: CPT

## 2018-12-09 PROCEDURE — 81025 URINE PREGNANCY TEST: CPT

## 2018-12-09 PROCEDURE — 36415 COLL VENOUS BLD VENIPUNCTURE: CPT

## 2018-12-09 PROCEDURE — 84484 ASSAY OF TROPONIN QUANT: CPT

## 2018-12-09 PROCEDURE — 99284 EMERGENCY DEPT VISIT MOD MDM: CPT

## 2018-12-09 RX ORDER — AMOXICILLIN 500 MG/1
500 TABLET, FILM COATED ORAL 3 TIMES DAILY
Qty: 30 TAB | Refills: 0 | Status: ON HOLD | OUTPATIENT
Start: 2018-12-09 | End: 2019-08-17 | Stop reason: CLARIF

## 2018-12-09 RX ORDER — ACETAMINOPHEN 500 MG
1000 TABLET ORAL ONCE
Status: COMPLETED | OUTPATIENT
Start: 2018-12-09 | End: 2018-12-10

## 2018-12-09 NOTE — LETTER
[facility] EMERGENCY DEPT 
[address] [facility] [address] [id number] [phone] Work/School Note Date: 12/9/2018 To Whom It May concern: 
 
Italia Baldwin was seen and treated today in the emergency room by the following provider(s): 
Attending Provider: Mariela De Los Santos MD. Italia Baldwin may return to work on 12/11/18. Sincerely, Lucy Flores MD

## 2018-12-10 PROCEDURE — 74011250637 HC RX REV CODE- 250/637: Performed by: EMERGENCY MEDICINE

## 2018-12-10 RX ADMIN — ACETAMINOPHEN 1000 MG: 500 TABLET, FILM COATED ORAL at 00:01

## 2018-12-10 NOTE — ED NOTES
Patient (s) was given copy of dc instructions and no paper script(s) and one electronic scripts. Patient (s) has verbalized understanding of instructions and script (s). Patient given a current medication reconciliation form and verbalized understanding of their medications. Patient (s) has verbalized understanding of the importance of discussing medications with  his or her physician or clinic they will be following up with. Patient alert and oriented and in no acute distress. Patient offered wheelchair from treatment area to hospital entrance, patient declined wheelchair. Patient left ED with family. Iveth Warren), Hematology; Internal Medicine; Medical Oncology  [address] [place] [address]  Phone: [phone]  Fax: [phone]

## 2018-12-10 NOTE — ED NOTES
Pt in ED w/ complaint of strong cough, constant mid CP (worse w/ cough), and SOB X 2 days. Pt states her cough is sometimes productive and sometimes dry. Pt states her CP is non radiating and her SOB comes w/ her cough. Pt is A&O X 4 and appears to be in no distress. Emergency Department Nursing Plan of Care       The Nursing Plan of Care is developed from the Nursing assessment and Emergency Department Attending provider initial evaluation. The plan of care may be reviewed in the ED Provider note.     The Plan of Care was developed with the following considerations:   Patient / Family readiness to learn indicated by:verbalized understanding  Persons(s) to be included in education: patient and family  Barriers to Learning/Limitations:No    Signed     Scar Leiva RN    12/9/2018   11:31 PM

## 2018-12-10 NOTE — DISCHARGE INSTRUCTIONS
Bronchitis: Care Instructions  Your Care Instructions    Bronchitis is inflammation of the bronchial tubes, which carry air to the lungs. The tubes swell and produce mucus, or phlegm. The mucus and inflamed bronchial tubes make you cough. You may have trouble breathing. Most cases of bronchitis are caused by viruses like those that cause colds. Antibiotics usually do not help and they may be harmful. Bronchitis usually develops rapidly and lasts about 2 to 3 weeks in otherwise healthy people. Follow-up care is a key part of your treatment and safety. Be sure to make and go to all appointments, and call your doctor if you are having problems. It's also a good idea to know your test results and keep a list of the medicines you take. How can you care for yourself at home? · Take all medicines exactly as prescribed. Call your doctor if you think you are having a problem with your medicine. · Get some extra rest.  · Take an over-the-counter pain medicine, such as acetaminophen (Tylenol), ibuprofen (Advil, Motrin), or naproxen (Aleve) to reduce fever and relieve body aches. Read and follow all instructions on the label. · Do not take two or more pain medicines at the same time unless the doctor told you to. Many pain medicines have acetaminophen, which is Tylenol. Too much acetaminophen (Tylenol) can be harmful. · Take an over-the-counter cough medicine that contains dextromethorphan to help quiet a dry, hacking cough so that you can sleep. Avoid cough medicines that have more than one active ingredient. Read and follow all instructions on the label. · Breathe moist air from a humidifier, hot shower, or sink filled with hot water. The heat and moisture will thin mucus so you can cough it out. · Do not smoke. Smoking can make bronchitis worse. If you need help quitting, talk to your doctor about stop-smoking programs and medicines. These can increase your chances of quitting for good.   When should you call for help? Call 911 anytime you think you may need emergency care. For example, call if:    · You have severe trouble breathing.    Call your doctor now or seek immediate medical care if:    · You have new or worse trouble breathing.     · You cough up dark brown or bloody mucus (sputum).     · You have a new or higher fever.     · You have a new rash.    Watch closely for changes in your health, and be sure to contact your doctor if:    · You cough more deeply or more often, especially if you notice more mucus or a change in the color of your mucus.     · You are not getting better as expected. Where can you learn more? Go to http://rosemary-rose.info/. Enter H333 in the search box to learn more about \"Bronchitis: Care Instructions. \"  Current as of: December 6, 2017  Content Version: 11.8  © 3290-5751 [employer]. Care instructions adapted under license by [employer] (which disclaims liability or warranty for this information). If you have questions about a medical condition or this instruction, always ask your healthcare professional. [place] [address] any warranty or liability for your use of this information. Musculoskeletal Chest Pain: Care Instructions  Your Care Instructions    Chest pain is not always a sign that something is wrong with your heart or that you have another serious problem. The doctor thinks your chest pain is caused by strained muscles or ligaments, inflamed chest cartilage, or another problem in your chest, rather than by your heart. You may need more tests to find the cause of your chest pain. Follow-up care is a key part of your treatment and safety. Be sure to make and go to all appointments, and call your doctor if you are having problems. It's also a good idea to know your test results and keep a list of the medicines you take. How can you care for yourself at home?   · Take pain medicines exactly as directed. ? If the doctor gave you a prescription medicine for pain, take it as prescribed. ? If you are not taking a prescription pain medicine, ask your doctor if you can take an over-the-counter medicine. · Rest and protect the sore area. · Stop, change, or take a break from any activity that may be causing your pain or soreness. · Put ice or a cold pack on the sore area for 10 to 20 minutes at a time. Try to do this every 1 to 2 hours for the next 3 days (when you are awake) or until the swelling goes down. Put a thin cloth between the ice and your skin. · After 2 or 3 days, apply a heating pad set on low or a warm cloth to the area that hurts. Some doctors suggest that you go back and forth between hot and cold. · Do not wrap or tape your ribs for support. This may cause you to take smaller breaths, which could increase your risk of lung problems. · Mentholated creams such as Bengay or Icy Hot may soothe sore muscles. Follow the instructions on the package. · Follow your doctor's instructions for exercising. · Gentle stretching and massage may help you get better faster. Stretch slowly to the point just before pain begins, and hold the stretch for at least 15 to 30 seconds. Do this 3 or 4 times a day. Stretch just after you have applied heat. · As your pain gets better, slowly return to your normal activities. Any increased pain may be a sign that you need to rest a while longer. When should you call for help? Call 911 anytime you think you may need emergency care. For example, call if:    · You have chest pain or pressure. This may occur with:  ? Sweating. ? Shortness of breath. ? Nausea or vomiting. ? Pain that spreads from the chest to the neck, jaw, or one or both shoulders or arms. ? Dizziness or lightheadedness. ? A fast or uneven pulse. After calling 911, chew 1 adult-strength aspirin. Wait for an ambulance.  Do not try to drive yourself.     · You have sudden chest pain and shortness of breath, or you cough up blood.    Call your doctor now or seek immediate medical care if:    · You have any trouble breathing.     · Your chest pain gets worse.     · Your chest pain occurs consistently with exercise and is relieved by rest.    Watch closely for changes in your health, and be sure to contact your doctor if:    · Your chest pain does not get better after 1 week. Where can you learn more? Go to http://rosemary-rose.info/. Enter V293 in the search box to learn more about \"Musculoskeletal Chest Pain: Care Instructions. \"  Current as of: November 20, 2017  Content Version: 11.8  © 9295-7687 [employer]. Care instructions adapted under license by [employer] (which disclaims liability or warranty for this information). If you have questions about a medical condition or this instruction, always ask your healthcare professional. [address] any warranty or liability for your use of this information. Learning About Pregnancy  Your Care Instructions    Your health in the early weeks of your pregnancy is particularly important for your baby's health. Take good care of yourself. Anything you do that harms your body can also harm your baby. Make sure to go to all of your doctor appointments. Regular checkups will help keep you and your baby healthy. How can you care for yourself at home? Diet    · Eat a balanced diet. Make sure your diet includes plenty of beans, peas, and leafy green vegetables.     · Do not skip meals or go for many hours without eating. If you are nauseated, try to eat a small, healthy snack every 2 to 3 hours.     · Do not eat fish that has a high level of mercury, such as shark, swordfish, or mackerel. Do not eat more than one can of tuna each week.     · Drink plenty of fluids, enough so that your urine is light yellow or clear like water.  If you have kidney, heart, or liver disease and have to limit fluids, talk with your doctor before you increase the amount of fluids you drink.     · Cut down on caffeine, such as coffee, tea, and cola.     · Do not drink alcohol, such as beer, wine, or hard liquor.     · Take a multivitamin that contains at least 400 micrograms (mcg) of folic acid to help prevent birth defects. Fortified cereal and whole wheat bread are good additional sources of folic acid.     · Increase the calcium in your diet. Try to drink a quart of skim milk each day. You may also take calcium supplements and choose foods such as cheese and yogurt.    Lifestyle    · Make sure you go to your follow-up appointments.     · Get plenty of rest. You may be unusually tired while you are pregnant.     · Get at least 30 minutes of exercise on most days of the week. Walking is a good choice. If you have not exercised in the past, start out slowly. Take several short walks each day.     · Do not smoke. If you need help quitting, talk to your doctor about stop-smoking programs. These can increase your chances of quitting for good.     · Do not touch cat feces or litter boxes. Also, wash your hands after you handle raw meat, and fully cook all meat before you eat it. Wear gloves when you work in the yard or garden, and wash your hands well when you are done. Cat feces, raw or undercooked meat, and contaminated dirt can cause an infection that may harm your baby or lead to a miscarriage.     · Do not use saunas or hot tubs. Raising your body temperature may harm your baby.     · Avoid chemical fumes, paint fumes, or poisons.     · Do not use illegal drugs or alcohol. Medicines    · Review all of your medicines with your doctor. Some of your routine medicines may need to be changed to protect your baby.     · Use acetaminophen (Tylenol) to relieve minor problems, such as a mild headache or backache or a mild fever with cold symptoms.  Do not use nonsteroidal anti-inflammatory drugs (NSAIDs), such as ibuprofen (Advil, Motrin) or naproxen (Aleve), unless your doctor says it is okay.     · Do not take two or more pain medicines at the same time unless the doctor told you to. Many pain medicines have acetaminophen, which is Tylenol. Too much acetaminophen (Tylenol) can be harmful.     · Take your medicines exactly as prescribed. Call your doctor if you think you are having a problem with your medicine.    To manage morning sickness    · If you feel sick when you first wake up, try eating a small snack (such as crackers) before you get out of bed. Allow some time to digest the snack, and then get out of bed slowly.     · Do not skip meals or go for long periods without eating. An empty stomach can make nausea worse.     · Eat small, frequent meals instead of three large meals each day.     · Drink plenty of fluids. Sports drinks, such as Gatorade or Powerade, are good choices.     · Eat foods that are high in protein but low in fat.     · If you are taking iron supplements, ask your doctor if they are necessary. Iron can make nausea worse.     · Avoid any smells, such as coffee, that make you feel sick.     · Get lots of rest. Morning sickness may be worse when you are tired. Follow-up care is a key part of your treatment and safety. Be sure to make and go to all appointments, and call your doctor if you are having problems. It's also a good idea to know your test results and keep a list of the medicines you take. Where can you learn more? Go to http://rosemary-rose.info/. Enter G146 in the search box to learn more about \"Learning About Pregnancy. \"  Current as of: November 21, 2017  Content Version: 11.8  © 7761-2509 [employer]. Care instructions adapted under license by [employer] (which disclaims liability or warranty for this information).  If you have questions about a medical condition or this instruction, always ask your healthcare professional. Micheal Chaudhary disclaims any warranty or liability for your use of this information.

## 2018-12-11 LAB
ATRIAL RATE: 84 BPM
CALCULATED P AXIS, ECG09: 71 DEGREES
CALCULATED R AXIS, ECG10: 12 DEGREES
CALCULATED T AXIS, ECG11: 46 DEGREES
DIAGNOSIS, 93000: NORMAL
P-R INTERVAL, ECG05: 148 MS
Q-T INTERVAL, ECG07: 368 MS
QRS DURATION, ECG06: 80 MS
QTC CALCULATION (BEZET), ECG08: 434 MS
VENTRICULAR RATE, ECG03: 84 BPM

## 2018-12-23 ENCOUNTER — HOSPITAL ENCOUNTER (EMERGENCY)
Age: 28
Discharge: HOME | End: 2018-12-23
Attending: EMERGENCY MEDICINE
Payer: COMMERCIAL

## 2018-12-23 ENCOUNTER — APPOINTMENT (OUTPATIENT)
Dept: ULTRASOUND IMAGING | Age: 28
End: 2018-12-23
Attending: PHYSICIAN ASSISTANT
Payer: COMMERCIAL

## 2018-12-23 VITALS
SYSTOLIC BLOOD PRESSURE: 132 MMHG | BODY MASS INDEX: 30.77 KG/M2 | WEIGHT: 203 LBS | DIASTOLIC BLOOD PRESSURE: 75 MMHG | HEIGHT: 68 IN | OXYGEN SATURATION: 98 % | TEMPERATURE: 98.7 F | RESPIRATION RATE: 19 BRPM

## 2018-12-23 DIAGNOSIS — O20.0 THREATENED MISCARRIAGE IN EARLY PREGNANCY: Primary | ICD-10-CM

## 2018-12-23 LAB
ABO + RH BLD: NORMAL
ANION GAP BLD CALC-SCNC: 20 MMOL/L (ref 10–20)
APPEARANCE UR: ABNORMAL
BACTERIA URNS QL MICRO: NEGATIVE /HPF
BILIRUB UR QL: NEGATIVE
BLOOD BANK CMNT PATIENT-IMP: NORMAL
BUN BLD-MCNC: 13 MG/DL (ref 9–20)
CA-I BLD-MCNC: 1.21 MMOL/L (ref 1.12–1.32)
CHLORIDE BLD-SCNC: 102 MMOL/L (ref 98–107)
CLUE CELLS VAG QL WET PREP: NORMAL
CO2 BLD-SCNC: 21 MMOL/L (ref 21–32)
COLOR UR: ABNORMAL
CREAT BLD-MCNC: 0.7 MG/DL (ref 0.6–1.3)
EPITH CASTS URNS QL MICRO: ABNORMAL /LPF
GLUCOSE BLD-MCNC: 85 MG/DL (ref 65–100)
GLUCOSE UR STRIP.AUTO-MCNC: NEGATIVE MG/DL
HCG SERPL-ACNC: ABNORMAL MIU/ML (ref 0–6)
HCT VFR BLD CALC: 37 % (ref 35–47)
HGB UR QL STRIP: ABNORMAL
KETONES UR QL STRIP.AUTO: 40 MG/DL
KOH PREP SPEC: NORMAL
LEUKOCYTE ESTERASE UR QL STRIP.AUTO: NEGATIVE
NITRITE UR QL STRIP.AUTO: NEGATIVE
PH UR STRIP: 5 [PH] (ref 5–8)
POTASSIUM BLD-SCNC: 3.6 MMOL/L (ref 3.5–5.1)
PROT UR STRIP-MCNC: NEGATIVE MG/DL
RBC #/AREA URNS HPF: ABNORMAL /HPF (ref 0–5)
SERVICE CMNT-IMP: NORMAL
SERVICE CMNT-IMP: NORMAL
SODIUM BLD-SCNC: 139 MMOL/L (ref 136–145)
SP GR UR REFRACTOMETRY: 1.03 (ref 1–1.03)
T VAGINALIS VAG QL WET PREP: NORMAL
UA: UC IF INDICATED,UAUC: ABNORMAL
UROBILINOGEN UR QL STRIP.AUTO: 0.2 EU/DL (ref 0.2–1)
WBC URNS QL MICRO: ABNORMAL /HPF (ref 0–4)

## 2018-12-23 PROCEDURE — 87210 SMEAR WET MOUNT SALINE/INK: CPT

## 2018-12-23 PROCEDURE — 80047 BASIC METABLC PNL IONIZED CA: CPT

## 2018-12-23 PROCEDURE — 86900 BLOOD TYPING SEROLOGIC ABO: CPT

## 2018-12-23 PROCEDURE — 99284 EMERGENCY DEPT VISIT MOD MDM: CPT

## 2018-12-23 PROCEDURE — 84702 CHORIONIC GONADOTROPIN TEST: CPT

## 2018-12-23 PROCEDURE — 81001 URINALYSIS AUTO W/SCOPE: CPT

## 2018-12-23 PROCEDURE — 36415 COLL VENOUS BLD VENIPUNCTURE: CPT

## 2018-12-23 PROCEDURE — 76817 TRANSVAGINAL US OBSTETRIC: CPT

## 2018-12-23 PROCEDURE — 76801 OB US < 14 WKS SINGLE FETUS: CPT

## 2018-12-23 PROCEDURE — 87491 CHLMYD TRACH DNA AMP PROBE: CPT

## 2018-12-23 NOTE — ED PROVIDER NOTES
EMERGENCY DEPARTMENT HISTORY AND PHYSICAL EXAM      Date: 2018  Patient Name: Christine Andre    History of Presenting Illness     Chief Complaint   Patient presents with    Vaginal Bleeding       History Provided By: Patient    HPI: Christine Andre, 34 y.o. female with no significant PMHx, presents ambulatory to the ED with cc of vaginal spotting today with assoc intermittent suprapubic abd cramping. Pt reports hx irregular cycles. Based on beta quant performed by OB, pt 5 weeks pregnant. . Denies dysuria, hematuria, fever/chills, change in vaginal discharge, N/V, diarrhea. Called OB who told her this was normal for pregnancy. F/u appt  with OB. Taking prenatal vitamins. Denies hx ectopic preg. Unsure of blood type. There are no other complaints, changes, or physical findings at this time. PCP: None    Current Outpatient Medications   Medication Sig Dispense Refill    amoxicillin 500 mg tab Take 500 mg by mouth three (3) times daily. 30 Tab 0       Past History     Past Medical History:  Past Medical History:   Diagnosis Date    Costochondritis        Past Surgical History:  Past Surgical History:   Procedure Laterality Date    HX ORTHOPAEDIC      Right fourth finger, \"Digit widgit\"       Family History:  Family History   Family history unknown: Yes       Social History:  Social History     Tobacco Use    Smoking status: Former Smoker    Smokeless tobacco: Never Used    Tobacco comment: \"only when I drink\"   Substance Use Topics    Alcohol use: No     Frequency: Never     Comment: Occasionally    Drug use: No       Allergies: Allergies   Allergen Reactions    Motrin [Ibuprofen] Hives         Review of Systems   Review of Systems   Constitutional: Negative for chills and fever. Respiratory: Negative for shortness of breath. Cardiovascular: Negative for chest pain. Gastrointestinal: Negative for abdominal pain, constipation, diarrhea, nausea and vomiting.    Genitourinary: Positive for pelvic pain and vaginal bleeding. Negative for decreased urine volume, flank pain, urgency, vaginal discharge and vaginal pain. Musculoskeletal: Negative for back pain and myalgias. Skin: Negative for color change, pallor, rash and wound. Neurological: Negative for dizziness, weakness and light-headedness. All other systems reviewed and are negative. Physical Exam   Physical Exam   Constitutional: She is oriented to person, place, and time. She appears well-developed and well-nourished. No distress. HENT:   Head: Normocephalic and atraumatic. Eyes: Conjunctivae are normal.   Cardiovascular: Normal rate, regular rhythm and normal heart sounds. Pulmonary/Chest: Effort normal and breath sounds normal. No respiratory distress. Abdominal: Soft. Bowel sounds are normal. She exhibits no distension. No abdominal tenderness on exam   Genitourinary: Cervix exhibits discharge (smalll amount of thin white discharge with scant amount of blood, no clots, cervical os closed). Cervix exhibits no motion tenderness and no friability. No erythema or bleeding in the vagina. No foreign body in the vagina. No signs of injury around the vagina. No vaginal discharge found. Musculoskeletal: Normal range of motion. Neurological: She is alert and oriented to person, place, and time. Skin: Skin is warm. No rash noted. Psychiatric: She has a normal mood and affect. Her behavior is normal.   Nursing note and vitals reviewed.       Diagnostic Study Results     Labs -     Recent Results (from the past 12 hour(s))   BETA HCG, QT    Collection Time: 12/23/18  5:50 PM   Result Value Ref Range    Beta HCG, QT 12,086 (H) 0 - 6 MIU/ML   BLOOD TYPE, (ABO+RH)    Collection Time: 12/23/18  5:50 PM   Result Value Ref Range    ABO/Rh(D) O POSITIVE     Comment SAMPLE NOT USABLE FOR CROSSMATCH    URINALYSIS W/ REFLEX CULTURE    Collection Time: 12/23/18  5:50 PM   Result Value Ref Range    Color YELLOW/STRAW Appearance CLOUDY (A) CLEAR      Specific gravity 1.030 1.003 - 1.030      pH (UA) 5.0 5.0 - 8.0      Protein NEGATIVE  NEG mg/dL    Glucose NEGATIVE  NEG mg/dL    Ketone 40 (A) NEG mg/dL    Bilirubin NEGATIVE  NEG      Blood LARGE (A) NEG      Urobilinogen 0.2 0.2 - 1.0 EU/dL    Nitrites NEGATIVE  NEG      Leukocyte Esterase NEGATIVE  NEG      WBC 0-4 0 - 4 /hpf    RBC 5-10 0 - 5 /hpf    Epithelial cells MANY (A) FEW /lpf    Bacteria NEGATIVE  NEG /hpf    UA:UC IF INDICATED CULTURE NOT INDICATED BY UA RESULT CNI     POC CHEM8    Collection Time: 12/23/18  5:51 PM   Result Value Ref Range    Calcium, ionized (POC) 1.21 1.12 - 1.32 mmol/L    Sodium (POC) 139 136 - 145 mmol/L    Potassium (POC) 3.6 3.5 - 5.1 mmol/L    Chloride (POC) 102 98 - 107 mmol/L    CO2 (POC) 21 21 - 32 mmol/L    Anion gap (POC) 20 10 - 20 mmol/L    Glucose (POC) 85 65 - 100 mg/dL    BUN (POC) 13 9 - 20 mg/dL    Creatinine (POC) 0.7 0.6 - 1.3 mg/dL    GFRAA, POC >60 >60 ml/min/1.73m2    GFRNA, POC >60 >60 ml/min/1.73m2    Hematocrit (POC) 37 35.0 - 47.0 %    Comment Comment Not Indicated. RICHARD, OTHER SOURCES    Collection Time: 12/23/18  6:00 PM   Result Value Ref Range    Special Requests: NO SPECIAL REQUESTS      KOH NO YEAST SEEN     WET PREP    Collection Time: 12/23/18  6:00 PM   Result Value Ref Range    Clue cells CLUE CELLS ABSENT      Wet prep NO TRICHOMONAS SEEN         Radiologic Studies -   US PREG UTS < 14 WKS SNGL   Final Result   IMPRESSION: Small gestational sac with estimated gestational age of 5 weeks 5   days. A fetal pole was not visualized. Simple cyst left ovary. Nonvisualization   of the right ovary. Correlation with transvaginal ultrasound will be performed. OB transvaginal ultrasound: Realtime sonographic imaging of the pelvis was   performed transvaginally. The examination demonstrates a gestational sac   measuring 1.18 cm with an estimated gestational age of 10 weeks 2 days. A fetal   pole is not visualized.  The left ovary is not visualized. The right ovary   measures 3.0 x 1.7 x 2.0 cm. It is normal in appearance. IMPRESSION: Single gestational sac with estimated gestational age of 10 weeks 2   days. A fetal pole was not visualized, which may be related to the early age of   the pregnancy. The left ovary is not visualized. Normal right ovary. US UTS TRANSVAGINAL OB   Final Result   IMPRESSION: Small gestational sac with estimated gestational age of 10 weeks 5   days. A fetal pole was not visualized. Simple cyst left ovary. Nonvisualization   of the right ovary. Correlation with transvaginal ultrasound will be performed. OB transvaginal ultrasound: Realtime sonographic imaging of the pelvis was   performed transvaginally. The examination demonstrates a gestational sac   measuring 1.18 cm with an estimated gestational age of 10 weeks 2 days. A fetal   pole is not visualized. The left ovary is not visualized. The right ovary   measures 3.0 x 1.7 x 2.0 cm. It is normal in appearance. IMPRESSION: Single gestational sac with estimated gestational age of 10 weeks 2   days. A fetal pole was not visualized, which may be related to the early age of   the pregnancy. The left ovary is not visualized. Normal right ovary. CT Results  (Last 48 hours)    None        CXR Results  (Last 48 hours)    None            Medical Decision Making   I am the first provider for this patient. I reviewed the vital signs, available nursing notes, past medical history, past surgical history, family history and social history. Vital Signs-Reviewed the patient's vital signs.   Patient Vitals for the past 12 hrs:   Temp Resp BP SpO2   12/23/18 1652 98.7 °F (37.1 °C) 19 132/75 98 %       Records Reviewed: Nursing Notes and Old Medical Records    Provider Notes (Medical Decision Making):   DDx: Threatened vs incomplete vs complete miscarriage, bv, yeast, trich, gonorrhea, chlamydia, uti    Not RH negative, no rhogam shot needed. ED Course:   Initial assessment performed. The patients presenting problems have been discussed, and they are in agreement with the care plan formulated and outlined with them. I have encouraged them to ask questions as they arise throughout their visit. Disposition:  7:34 PM  Discussed lab and imaging results with pt along with dx and treatment plan. Discussed importance of OB follow up. All questions answered. Pt voiced they understood. Return if sx worsen. PLAN:  1. Current Discharge Medication List      CONTINUE these medications which have NOT CHANGED    Details   amoxicillin 500 mg tab Take 500 mg by mouth three (3) times daily. Qty: 30 Tab, Refills: 0           2. Follow-up Information     Follow up With Specialties Details Why Contact Info       Keep appointment with OB on 1/9         Return to ED if worse     Diagnosis     Clinical Impression:   1.  Threatened miscarriage in early pregnancy

## 2018-12-23 NOTE — ED TRIAGE NOTES
Patient with unknown [facility] and no prenatal care presents to ED with concerns of vaginal bleeding described as light. patietn states she noticed pink-brownish tinged blood when wiping after using bathroom today. Reports 410 LLQ pelvic pain.    .

## 2018-12-24 LAB
C TRACH DNA SPEC QL NAA+PROBE: NEGATIVE
N GONORRHOEA DNA SPEC QL NAA+PROBE: NEGATIVE
SAMPLE TYPE: NORMAL
SERVICE CMNT-IMP: NORMAL
SPECIMEN SOURCE: NORMAL

## 2018-12-24 NOTE — DISCHARGE INSTRUCTIONS
Threatened Miscarriage: Care Instructions  Your Care Instructions    Some women have light spotting or bleeding during the first 12 weeks of pregnancy. In some cases this is normal. Light spotting or bleeding can also be a sign of a possible loss of the pregnancy. This is called a threatened miscarriage. At this point, the doctor may not be able to tell if your vaginal bleeding is normal or is a sign of a miscarriage. In early pregnancy, things such as stress, exercise, and sex do not cause miscarriage. You may be worried or upset about the possibility of losing your pregnancy. But do not blame yourself. There is no treatment to stop a threatened miscarriage. If you do have a miscarriage, there was nothing you could have done to prevent it. A miscarriage usually means that the pregnancy is not developing normally. The doctor has checked you carefully, but problems can develop later. If you notice any problems or new symptoms, get medical treatment right away. Follow-up care is a key part of your treatment and safety. Be sure to make and go to all appointments, and call your doctor if you are having problems. It's also a good idea to know your test results and keep a list of the medicines you take. How can you care for yourself at home? · If you do have a miscarriage, you will probably have some vaginal bleeding for 1 to 2 weeks. Use pads instead of tampons. · Take acetaminophen (Tylenol) for cramps. Read and follow all instructions on the label. · Do not take two or more pain medicines at the same time unless the doctor told you to. Many pain medicines have acetaminophen, which is Tylenol. Too much acetaminophen (Tylenol) can be harmful. · Do not have sex until your doctor says it is okay. · Get lots of rest over the next several days. · You may do your normal activities if you feel well enough to do them. But do not do any heavy exercise until your doctor says it is okay.   · Eat a balanced diet that is high in iron and vitamin C. Foods rich in iron include red meat, shellfish, eggs, beans, and leafy green vegetables. Foods high in vitamin C include citrus fruits, tomatoes, and broccoli. Talk to your doctor about whether you need to take iron pills or a multivitamin. · Do not drink alcohol or use tobacco or illegal drugs. · Do not smoke. If you need help quitting, talk to your doctor about stop-smoking programs and medicines. These can increase your chances of quitting for good. When should you call for help? Call 911 anytime you think you may need emergency care. For example, call if:    · You passed out (lost consciousness).    Call your doctor now or seek immediate medical care if:    · You have severe vaginal bleeding.     · You are dizzy or lightheaded, or you feel like you may faint.     · You have new or worse pain in your belly or pelvis.     · You have a fever.     · You have vaginal discharge that smells bad.    Watch closely for changes in your health, and be sure to contact your doctor if:    · You do not get better as expected. Where can you learn more? Go to http://rosemary-rose.info/. Enter G154 in the search box to learn more about \"Threatened Miscarriage: Care Instructions. \"  Current as of: November 21, 2017  Content Version: 11.8  © 6041-9554 [employer], Incorporated. Care instructions adapted under license by "[employer]" (which disclaims liability or warranty for this information). If you have questions about a medical condition or this instruction, always ask your healthcare professional. [place] [address] any warranty or liability for your use of this information.

## 2018-12-24 NOTE — ED NOTES
Emergency Department Nursing Plan of Care       The Nursing Plan of Care is developed from the Nursing assessment and Emergency Department Attending provider initial evaluation. The plan of care may be reviewed in the ED Provider note.     The Plan of Care was developed with the following considerations:   Patient / Family readiness to learn indicated by:verbalized understanding  Persons(s) to be included in education: patient  Barriers to Learning/Limitations:No    Signed     Kaia Driscoll RN    12/23/2018   7:23 PM

## 2019-01-09 LAB
ANTIBODY SCREEN, EXTERNAL: NORMAL
CHLAMYDIA, EXTERNAL: NORMAL
HBSAG, EXTERNAL: NORMAL
HIV, EXTERNAL: NON REACTIVE
N. GONORRHEA, EXTERNAL: NORMAL
RUBELLA, EXTERNAL: NORMAL
T. PALLIDUM, EXTERNAL: NORMAL
TYPE, ABO & RH, EXTERNAL: NORMAL

## 2019-01-31 ENCOUNTER — APPOINTMENT (OUTPATIENT)
Dept: ULTRASOUND IMAGING | Age: 29
End: 2019-01-31
Attending: PHYSICIAN ASSISTANT
Payer: COMMERCIAL

## 2019-01-31 ENCOUNTER — HOSPITAL ENCOUNTER (EMERGENCY)
Age: 29
Discharge: HOME | End: 2019-01-31
Attending: EMERGENCY MEDICINE
Payer: COMMERCIAL

## 2019-01-31 VITALS
RESPIRATION RATE: 18 BRPM | TEMPERATURE: 98.5 F | WEIGHT: 197 LBS | DIASTOLIC BLOOD PRESSURE: 62 MMHG | SYSTOLIC BLOOD PRESSURE: 110 MMHG | HEIGHT: 67 IN | OXYGEN SATURATION: 100 % | BODY MASS INDEX: 30.92 KG/M2 | HEART RATE: 83 BPM

## 2019-01-31 DIAGNOSIS — O20.0 THREATENED MISCARRIAGE IN EARLY PREGNANCY: Primary | ICD-10-CM

## 2019-01-31 LAB
ALBUMIN SERPL-MCNC: 3.4 G/DL (ref 3.5–5)
ALBUMIN/GLOB SERPL: 0.9 {RATIO} (ref 1.1–2.2)
ALP SERPL-CCNC: 43 U/L (ref 45–117)
ALT SERPL-CCNC: 22 U/L (ref 12–78)
ANION GAP SERPL CALC-SCNC: 10 MMOL/L (ref 5–15)
APPEARANCE UR: ABNORMAL
AST SERPL-CCNC: 13 U/L (ref 15–37)
BACTERIA URNS QL MICRO: ABNORMAL /HPF
BASOPHILS # BLD: 0 K/UL (ref 0–0.1)
BASOPHILS NFR BLD: 0 % (ref 0–1)
BILIRUB SERPL-MCNC: 0.3 MG/DL (ref 0.2–1)
BILIRUB UR QL: NEGATIVE
BUN SERPL-MCNC: 7 MG/DL (ref 6–20)
BUN/CREAT SERPL: 13 (ref 12–20)
CALCIUM SERPL-MCNC: 8.7 MG/DL (ref 8.5–10.1)
CHLORIDE SERPL-SCNC: 104 MMOL/L (ref 97–108)
CO2 SERPL-SCNC: 24 MMOL/L (ref 21–32)
COLOR UR: ABNORMAL
CREAT SERPL-MCNC: 0.56 MG/DL (ref 0.55–1.02)
DIFFERENTIAL METHOD BLD: NORMAL
EOSINOPHIL # BLD: 0.2 K/UL (ref 0–0.4)
EOSINOPHIL NFR BLD: 2 % (ref 0–7)
EPITH CASTS URNS QL MICRO: ABNORMAL /LPF
ERYTHROCYTE [DISTWIDTH] IN BLOOD BY AUTOMATED COUNT: 11.9 % (ref 11.5–14.5)
GLOBULIN SER CALC-MCNC: 3.7 G/DL (ref 2–4)
GLUCOSE SERPL-MCNC: 77 MG/DL (ref 65–100)
GLUCOSE UR STRIP.AUTO-MCNC: NEGATIVE MG/DL
HCT VFR BLD AUTO: 35.2 % (ref 35–47)
HGB BLD-MCNC: 11.6 G/DL (ref 11.5–16)
HGB UR QL STRIP: NEGATIVE
IMM GRANULOCYTES # BLD AUTO: 0 K/UL (ref 0–0.04)
IMM GRANULOCYTES NFR BLD AUTO: 0 % (ref 0–0.5)
KETONES UR QL STRIP.AUTO: NEGATIVE MG/DL
LEUKOCYTE ESTERASE UR QL STRIP.AUTO: ABNORMAL
LYMPHOCYTES # BLD: 2.2 K/UL (ref 0.8–3.5)
LYMPHOCYTES NFR BLD: 21 % (ref 12–49)
MCH RBC QN AUTO: 27.7 PG (ref 26–34)
MCHC RBC AUTO-ENTMCNC: 33 G/DL (ref 30–36.5)
MCV RBC AUTO: 84 FL (ref 80–99)
MONOCYTES # BLD: 0.9 K/UL (ref 0–1)
MONOCYTES NFR BLD: 9 % (ref 5–13)
NEUTS SEG # BLD: 7 K/UL (ref 1.8–8)
NEUTS SEG NFR BLD: 68 % (ref 32–75)
NITRITE UR QL STRIP.AUTO: NEGATIVE
NRBC # BLD: 0 K/UL (ref 0–0.01)
NRBC BLD-RTO: 0 PER 100 WBC
PH UR STRIP: 6 [PH] (ref 5–8)
PLATELET # BLD AUTO: 256 K/UL (ref 150–400)
PMV BLD AUTO: 9.6 FL (ref 8.9–12.9)
POTASSIUM SERPL-SCNC: 3.6 MMOL/L (ref 3.5–5.1)
PROT SERPL-MCNC: 7.1 G/DL (ref 6.4–8.2)
PROT UR STRIP-MCNC: NEGATIVE MG/DL
RBC # BLD AUTO: 4.19 M/UL (ref 3.8–5.2)
RBC #/AREA URNS HPF: ABNORMAL /HPF (ref 0–5)
SODIUM SERPL-SCNC: 138 MMOL/L (ref 136–145)
SP GR UR REFRACTOMETRY: 1.02 (ref 1–1.03)
UA: UC IF INDICATED,UAUC: ABNORMAL
UROBILINOGEN UR QL STRIP.AUTO: 1 EU/DL (ref 0.2–1)
WBC # BLD AUTO: 10.3 K/UL (ref 3.6–11)
WBC URNS QL MICRO: ABNORMAL /HPF (ref 0–4)

## 2019-01-31 PROCEDURE — 85025 COMPLETE CBC W/AUTO DIFF WBC: CPT

## 2019-01-31 PROCEDURE — 80053 COMPREHEN METABOLIC PANEL: CPT

## 2019-01-31 PROCEDURE — 36415 COLL VENOUS BLD VENIPUNCTURE: CPT

## 2019-01-31 PROCEDURE — 81001 URINALYSIS AUTO W/SCOPE: CPT

## 2019-01-31 PROCEDURE — 87086 URINE CULTURE/COLONY COUNT: CPT

## 2019-01-31 PROCEDURE — 76817 TRANSVAGINAL US OBSTETRIC: CPT

## 2019-01-31 PROCEDURE — 99283 EMERGENCY DEPT VISIT LOW MDM: CPT

## 2019-01-31 RX ORDER — SODIUM CHLORIDE 0.9 % (FLUSH) 0.9 %
5-40 SYRINGE (ML) INJECTION EVERY 8 HOURS
Status: DISCONTINUED | OUTPATIENT
Start: 2019-01-31 | End: 2019-01-31 | Stop reason: HOSPADM

## 2019-01-31 RX ORDER — SODIUM CHLORIDE 0.9 % (FLUSH) 0.9 %
5-40 SYRINGE (ML) INJECTION AS NEEDED
Status: DISCONTINUED | OUTPATIENT
Start: 2019-01-31 | End: 2019-01-31 | Stop reason: HOSPADM

## 2019-01-31 RX ORDER — CEPHALEXIN 500 MG/1
500 CAPSULE ORAL 2 TIMES DAILY
Qty: 14 CAP | Refills: 0 | Status: SHIPPED | OUTPATIENT
Start: 2019-01-31 | End: 2019-02-07

## 2019-01-31 NOTE — ED PROVIDER NOTES
EMERGENCY DEPARTMENT HISTORY AND PHYSICAL EXAM      Date: 1/31/2019  Patient Name: Rahel Garza    History of Presenting Illness     Chief Complaint   Patient presents with    Abdominal Pain       History Provided By: Patient    HPI: Caroline Arias 11 weeks pregnant 34 y.o. female with no pertinent PMHx, presents ambulatory to the ED with cc of new onset cramping suprapubic abd pain x 1 day with associated red vaginal discharge. Pt states that the pain started last night, but it worsened today. She additionally notes that she noticed a red vaginal discharge when she went to the bathroom earlier today. She notes that she called her OB/GYN 3 hours pta, but states that they were unable to see her in a timely manner, prompting ED visit. Per chart review, when pt last visited the ED her blood type was determined to be O+. Pt denies any prior complications with her pregnancy. She states that she is not currently taking any regular medications. Pt denies any modifying factors. She specifically denies any nausea, vomiting, fever, or dysuria. Social Hx: -tobacco, -EtOH, -Illicit Drugs    There are no other complaints, changes, or physical findings at this time. PCP: None  OB/GYN: Marlene Johnson MD  No current facility-administered medications on file prior to encounter. Current Outpatient Medications on File Prior to Encounter   Medication Sig Dispense Refill    amoxicillin 500 mg tab Take 500 mg by mouth three (3) times daily.  30 Tab 0       Past History     Past Medical History:  Past Medical History:   Diagnosis Date    Costochondritis        Past Surgical History:  Past Surgical History:   Procedure Laterality Date    HX ORTHOPAEDIC      Right fourth finger, \"Digit widgit\"       Family History:  Family History   Family history unknown: Yes       Social History:  Social History     Tobacco Use    Smoking status: Former Smoker    Smokeless tobacco: Never Used    Tobacco comment: \"only when I drink\"   Substance Use Topics    Alcohol use: No     Frequency: Never     Comment: Occasionally    Drug use: No       Allergies: Allergies   Allergen Reactions    Motrin [Ibuprofen] Hives         Review of Systems   Review of Systems   Constitutional: Negative for chills and fever. HENT: Negative for ear pain and sore throat. Eyes: Negative for redness and visual disturbance. Respiratory: Negative for cough and shortness of breath. Cardiovascular: Negative for chest pain and palpitations. Gastrointestinal: Positive for abdominal pain. Negative for nausea and vomiting. Genitourinary: Positive for vaginal bleeding and vaginal discharge. Negative for dysuria and hematuria. Musculoskeletal: Negative for back pain and gait problem. Skin: Negative for rash and wound. Neurological: Negative for dizziness and headaches. Psychiatric/Behavioral: Negative for behavioral problems and confusion. All other systems reviewed and are negative. Physical Exam   Physical Exam   Constitutional: She is oriented to person, place, and time. She appears well-developed and well-nourished. Pt is tearful. HENT:   Head: Normocephalic and atraumatic. Eyes: Conjunctivae and EOM are normal. Pupils are equal, round, and reactive to light. Neck: Normal range of motion. Neck supple. Cardiovascular: Normal rate, regular rhythm and normal heart sounds. Pulmonary/Chest: Effort normal and breath sounds normal.   Abdominal: There is no CVA tenderness. Mild tenderness to palpation diffusely on lower abd. Musculoskeletal: Normal range of motion. Neurological: She is alert and oriented to person, place, and time. She has normal strength. No cranial nerve deficit or sensory deficit. GCS eye subscore is 4. GCS verbal subscore is 5. GCS motor subscore is 6. Skin: Skin is warm and dry. No rash noted. Psychiatric: She has a normal mood and affect.  Her behavior is normal.   Nursing note and vitals reviewed. Diagnostic Study Results     Labs -     Recent Results (from the past 12 hour(s))   CBC WITH AUTOMATED DIFF    Collection Time: 01/31/19  6:39 PM   Result Value Ref Range    WBC 10.3 3.6 - 11.0 K/uL    RBC 4.19 3.80 - 5.20 M/uL    HGB 11.6 11.5 - 16.0 g/dL    HCT 35.2 35.0 - 47.0 %    MCV 84.0 80.0 - 99.0 FL    MCH 27.7 26.0 - 34.0 PG    MCHC 33.0 30.0 - 36.5 g/dL    RDW 11.9 11.5 - 14.5 %    PLATELET 505 139 - 817 K/uL    MPV 9.6 8.9 - 12.9 FL    NRBC 0.0 0  WBC    ABSOLUTE NRBC 0.00 0.00 - 0.01 K/uL    NEUTROPHILS 68 32 - 75 %    LYMPHOCYTES 21 12 - 49 %    MONOCYTES 9 5 - 13 %    EOSINOPHILS 2 0 - 7 %    BASOPHILS 0 0 - 1 %    IMMATURE GRANULOCYTES 0 0.0 - 0.5 %    ABS. NEUTROPHILS 7.0 1.8 - 8.0 K/UL    ABS. LYMPHOCYTES 2.2 0.8 - 3.5 K/UL    ABS. MONOCYTES 0.9 0.0 - 1.0 K/UL    ABS. EOSINOPHILS 0.2 0.0 - 0.4 K/UL    ABS. BASOPHILS 0.0 0.0 - 0.1 K/UL    ABS. IMM. GRANS. 0.0 0.00 - 0.04 K/UL    DF AUTOMATED     METABOLIC PANEL, COMPREHENSIVE    Collection Time: 01/31/19  6:39 PM   Result Value Ref Range    Sodium 138 136 - 145 mmol/L    Potassium 3.6 3.5 - 5.1 mmol/L    Chloride 104 97 - 108 mmol/L    CO2 24 21 - 32 mmol/L    Anion gap 10 5 - 15 mmol/L    Glucose 77 65 - 100 mg/dL    BUN 7 6 - 20 MG/DL    Creatinine 0.56 0.55 - 1.02 MG/DL    BUN/Creatinine ratio 13 12 - 20      GFR est AA >60 >60 ml/min/1.73m2    GFR est non-AA >60 >60 ml/min/1.73m2    Calcium 8.7 8.5 - 10.1 MG/DL    Bilirubin, total 0.3 0.2 - 1.0 MG/DL    ALT (SGPT) 22 12 - 78 U/L    AST (SGOT) 13 (L) 15 - 37 U/L    Alk.  phosphatase 43 (L) 45 - 117 U/L    Protein, total 7.1 6.4 - 8.2 g/dL    Albumin 3.4 (L) 3.5 - 5.0 g/dL    Globulin 3.7 2.0 - 4.0 g/dL    A-G Ratio 0.9 (L) 1.1 - 2.2     URINALYSIS W/ REFLEX CULTURE    Collection Time: 01/31/19  6:39 PM   Result Value Ref Range    Color YELLOW/STRAW      Appearance TURBID (A) CLEAR      Specific gravity 1.025 1.003 - 1.030      pH (UA) 6.0 5.0 - 8.0      Protein NEGATIVE  NEG mg/dL    Glucose NEGATIVE  NEG mg/dL    Ketone NEGATIVE  NEG mg/dL    Bilirubin NEGATIVE  NEG      Blood NEGATIVE  NEG      Urobilinogen 1.0 0.2 - 1.0 EU/dL    Nitrites NEGATIVE  NEG      Leukocyte Esterase MODERATE (A) NEG      WBC 5-10 0 - 4 /hpf    RBC 0-5 0 - 5 /hpf    Epithelial cells MODERATE (A) FEW /lpf    Bacteria 4+ (A) NEG /hpf    UA:UC IF INDICATED URINE CULTURE ORDERED (A) CNI         Radiologic Studies -   US UTS TRANSVAGINAL OB   Final result by Yolanda Gilbert MD (01/31/19 19:30:14)                Impression:    IMPRESSION: There is an intrauterine gestational sac which contains a fetal pole  measuring 11 weeks 4 days with a fetal heart rate 167 bpm. Amniotic fluid volume  is normal.              Narrative:    EXAM:  ULTRASOUND FEMALE PELVIS TRANSABDOMINAL AND TRANSVAGINAL    TRANSABDOMINAL       TRANSVAGINAL    INDICATION: Spotting, pregnancy. COMPARISON: None. TECHNIQUE: Transvaginal sonography was performed with multiple static images of  the uterus and ovariesobtained. FINDINGS:     UTERUS:  Uterus measures 12 x 9 x 8.9 cm. There is an intrauterine gestational sac which contains a fetal pole measuring  4.78 cm which is 11 weeks 4 days. Fetal heart rate equals 167 bpm. Amniotic  fluid volume is normal. Placenta appears to be fundal.    Cervix is unremarkable      RIGHT OVARY:  The right ovary is normal. The right ovary measures 3.8 x 3.6 x 1.4 cm.  There  is normal color flow to the right ovary. LEFT OVARY:  The left ovary is obscured by bowel gas. .    CUL-DE-SAC:  There is no fluid or mass or other abnormality in the pelvic cul-de-sac. Medical Decision Making   I am the first provider for this patient. I reviewed the vital signs, available nursing notes, past medical history, past surgical history, family history and social history. Vital Signs-Reviewed the patient's vital signs.   Patient Vitals for the past 12 hrs:   Temp Pulse Resp BP SpO2   01/31/19 1830 -- -- -- 110/62 --   01/31/19 1655 98.5 °F (36.9 °C) 83 18 (!) 161/113 100 %       Pulse Oximetry Analysis - 100% on room air    Cardiac Monitor:   Rate: 83 bpm  Rhythm: Normal Sinus Rhythm    Records Reviewed: Nursing Notes, Old Medical Records, Previous Radiology Studies and Previous Laboratory Studies    Provider Notes (Medical Decision Making):   DDx: Vomiting in pregnancy, hyperemesis gravidarum, threatened AB, missed AB, incomplete AB, UTI    ED Course:   Initial assessment performed. The patients presenting problems have been discussed, and they are in agreement with the care plan formulated and outlined with them. I have encouraged them to ask questions as they arise throughout their visit. ED Course as of Jan 31 2319   Thu Jan 31, 2019 1949 Discussed results with pt, she states that she feels comfortable with discharge. [RS]      ED Course User Index  [RS] Shelley Alonzo written by Kerline Rodrigues, ED scribe, as dictated by Jesus Colindres PA-C    Critical Care Time:   0 minutes. Disposition:  DISCHARGE NOTE:  7:56 PM  The patient is ready for discharge. The patients signs, symptoms, diagnosis, and instructions for discharge have been discussed and the pt has conveyed their understanding. The patient is to follow up as recommended with OB/GYN or return to the ER should their symptoms worsen. Plan has been discussed and patient has conveyed their agreement. PLAN:  1. Discharge home. Discharge Medication List as of 1/31/2019  7:57 PM        2. Follow-up Information     Follow up With Specialties Details Why Contact Info    Your OB/gyn  Call in 1 day      [facility] EMERGENCY DEPT Emergency Medicine  If symptoms worsen Antonio Albarado  [phone]        Return to ED if worse     Diagnosis     Clinical Impression:   1. Threatened miscarriage in early pregnancy        Attestations:     This note is prepared by Kerline Rodrigues, acting as Scribe for Jesus Colindres TYRESE.    Casper Sims PA-C: The scribe's documentation has been prepared under my direction and personally reviewed by me in its entirety. I confirm that the note above accurately reflects all work, treatment, procedures, and medical decision making performed by me. This note will not be viewable in [address]

## 2019-01-31 NOTE — ED NOTES
Per pt reports lower abdominal cramping, scant bright red vaginal bleeding, 11 weeks pregnant, A0. Denies N/V/D and urinary symptoms. Emergency Department Nursing Plan of Care       The Nursing Plan of Care is developed from the Nursing assessment and Emergency Department Attending provider initial evaluation. The plan of care may be reviewed in the ED Provider note.     The Plan of Care was developed with the following considerations:   Patient / Family readiness to learn indicated by:verbalized understanding  Persons(s) to be included in education: patient  Barriers to Learning/Limitations:No    Signed     Singh Cope RN    2019   6:31 PM

## 2019-02-01 NOTE — DISCHARGE INSTRUCTIONS
Patient Education        Threatened Miscarriage: Care Instructions  Your Care Instructions    Some women have light spotting or bleeding during the first 12 weeks of pregnancy. In some cases this is normal. Light spotting or bleeding can also be a sign of a possible loss of the pregnancy. This is called a threatened miscarriage. At this point, the doctor may not be able to tell if your vaginal bleeding is normal or is a sign of a miscarriage. In early pregnancy, things such as stress, exercise, and sex do not cause miscarriage. You may be worried or upset about the possibility of losing your pregnancy. But do not blame yourself. There is no treatment to stop a threatened miscarriage. If you do have a miscarriage, there was nothing you could have done to prevent it. A miscarriage usually means that the pregnancy is not developing normally. The doctor has checked you carefully, but problems can develop later. If you notice any problems or new symptoms, get medical treatment right away. Follow-up care is a key part of your treatment and safety. Be sure to make and go to all appointments, and call your doctor if you are having problems. It's also a good idea to know your test results and keep a list of the medicines you take. How can you care for yourself at home? · If you do have a miscarriage, you will probably have some vaginal bleeding for 1 to 2 weeks. Use pads instead of tampons. · Take acetaminophen (Tylenol) for cramps. Read and follow all instructions on the label. · Do not take two or more pain medicines at the same time unless the doctor told you to. Many pain medicines have acetaminophen, which is Tylenol. Too much acetaminophen (Tylenol) can be harmful. · Do not have sex until your doctor says it is okay. · Get lots of rest over the next several days. · You may do your normal activities if you feel well enough to do them. But do not do any heavy exercise until your doctor says it is okay.   · Eat a balanced diet that is high in iron and vitamin C. Foods rich in iron include red meat, shellfish, eggs, beans, and leafy green vegetables. Foods high in vitamin C include citrus fruits, tomatoes, and broccoli. Talk to your doctor about whether you need to take iron pills or a multivitamin. · Do not drink alcohol or use tobacco or illegal drugs. · Do not smoke. If you need help quitting, talk to your doctor about stop-smoking programs and medicines. These can increase your chances of quitting for good. When should you call for help? Call 911 anytime you think you may need emergency care. For example, call if:    · You passed out (lost consciousness).    Call your doctor now or seek immediate medical care if:    · You have severe vaginal bleeding.     · You are dizzy or lightheaded, or you feel like you may faint.     · You have new or worse pain in your belly or pelvis.     · You have a fever.     · You have vaginal discharge that smells bad.    Watch closely for changes in your health, and be sure to contact your doctor if:    · You do not get better as expected. Where can you learn more? Go to http://rosemary-rose.info/. Enter G430 in the search box to learn more about \"Threatened Miscarriage: Care Instructions. \"  Current as of: September 5, 2018  Content Version: 11.9  © 3336-9972 [email or web address]. Care instructions adapted under license by [employer] (which disclaims liability or warranty for this information). If you have questions about a medical condition or this instruction, always ask your healthcare professional. [place] [address] any warranty or liability for your use of this information.

## 2019-02-02 LAB
BACTERIA SPEC CULT: NORMAL
CC UR VC: NORMAL
SERVICE CMNT-IMP: NORMAL

## 2019-03-26 ENCOUNTER — HOSPITAL ENCOUNTER (EMERGENCY)
Age: 29
Discharge: LEFT BEFORE DISCHARGE | End: 2019-03-26
Attending: EMERGENCY MEDICINE
Payer: COMMERCIAL

## 2019-03-26 ENCOUNTER — HOSPITAL ENCOUNTER (EMERGENCY)
Age: 29
Discharge: HOME | End: 2019-03-26
Attending: EMERGENCY MEDICINE
Payer: COMMERCIAL

## 2019-03-26 VITALS
OXYGEN SATURATION: 100 % | HEIGHT: 67 IN | SYSTOLIC BLOOD PRESSURE: 106 MMHG | RESPIRATION RATE: 18 BRPM | TEMPERATURE: 97.9 F | HEART RATE: 70 BPM | BODY MASS INDEX: 32.15 KG/M2 | DIASTOLIC BLOOD PRESSURE: 75 MMHG | WEIGHT: 204.81 LBS

## 2019-03-26 VITALS
HEART RATE: 94 BPM | RESPIRATION RATE: 16 BRPM | HEIGHT: 67 IN | TEMPERATURE: 98.8 F | BODY MASS INDEX: 32.15 KG/M2 | WEIGHT: 204.81 LBS | OXYGEN SATURATION: 100 % | DIASTOLIC BLOOD PRESSURE: 76 MMHG | SYSTOLIC BLOOD PRESSURE: 154 MMHG

## 2019-03-26 DIAGNOSIS — R07.9 CHEST PAIN, UNSPECIFIED TYPE: Primary | ICD-10-CM

## 2019-03-26 LAB
ALBUMIN SERPL-MCNC: 3.2 G/DL (ref 3.5–5)
ALBUMIN/GLOB SERPL: 0.8 {RATIO} (ref 1.1–2.2)
ALP SERPL-CCNC: 50 U/L (ref 45–117)
ALT SERPL-CCNC: 43 U/L (ref 12–78)
ANION GAP BLD CALC-SCNC: 17 MMOL/L (ref 10–20)
ANION GAP SERPL CALC-SCNC: 7 MMOL/L (ref 5–15)
AST SERPL-CCNC: 25 U/L (ref 15–37)
ATRIAL RATE: 76 BPM
BASOPHILS # BLD: 0 K/UL (ref 0–0.1)
BASOPHILS NFR BLD: 0 % (ref 0–1)
BILIRUB SERPL-MCNC: 0.3 MG/DL (ref 0.2–1)
BUN BLD-MCNC: 5 MG/DL (ref 9–20)
BUN SERPL-MCNC: 8 MG/DL (ref 6–20)
BUN/CREAT SERPL: 16 (ref 12–20)
CA-I BLD-MCNC: 1.2 MMOL/L (ref 1.12–1.32)
CALCIUM SERPL-MCNC: 8.6 MG/DL (ref 8.5–10.1)
CALCULATED P AXIS, ECG09: 50 DEGREES
CALCULATED R AXIS, ECG10: 24 DEGREES
CALCULATED T AXIS, ECG11: 48 DEGREES
CHLORIDE BLD-SCNC: 103 MMOL/L (ref 98–107)
CHLORIDE SERPL-SCNC: 105 MMOL/L (ref 97–108)
CO2 BLD-SCNC: 23 MMOL/L (ref 21–32)
CO2 SERPL-SCNC: 24 MMOL/L (ref 21–32)
CREAT BLD-MCNC: 0.4 MG/DL (ref 0.6–1.3)
CREAT SERPL-MCNC: 0.49 MG/DL (ref 0.55–1.02)
D DIMER PPP FEU-MCNC: 0.54 MG/L FEU (ref 0–0.65)
D DIMER PPP FEU-MCNC: 0.55 MG/L FEU (ref 0–0.65)
DIAGNOSIS, 93000: NORMAL
DIFFERENTIAL METHOD BLD: ABNORMAL
EOSINOPHIL # BLD: 0.2 K/UL (ref 0–0.4)
EOSINOPHIL NFR BLD: 2 % (ref 0–7)
ERYTHROCYTE [DISTWIDTH] IN BLOOD BY AUTOMATED COUNT: 12.5 % (ref 11.5–14.5)
GLOBULIN SER CALC-MCNC: 4.2 G/DL (ref 2–4)
GLUCOSE BLD-MCNC: 93 MG/DL (ref 65–100)
GLUCOSE SERPL-MCNC: 93 MG/DL (ref 65–100)
HCT VFR BLD AUTO: 36.7 % (ref 35–47)
HCT VFR BLD CALC: 35 % (ref 35–47)
HGB BLD-MCNC: 12.2 G/DL (ref 11.5–16)
IMM GRANULOCYTES # BLD AUTO: 0.1 K/UL (ref 0–0.04)
IMM GRANULOCYTES NFR BLD AUTO: 1 % (ref 0–0.5)
INR PPP: 1 (ref 0.9–1.1)
LYMPHOCYTES # BLD: 1.8 K/UL (ref 0.8–3.5)
LYMPHOCYTES NFR BLD: 18 % (ref 12–49)
MCH RBC QN AUTO: 28.1 PG (ref 26–34)
MCHC RBC AUTO-ENTMCNC: 33.2 G/DL (ref 30–36.5)
MCV RBC AUTO: 84.6 FL (ref 80–99)
MONOCYTES # BLD: 0.8 K/UL (ref 0–1)
MONOCYTES NFR BLD: 8 % (ref 5–13)
NEUTS SEG # BLD: 7.3 K/UL (ref 1.8–8)
NEUTS SEG NFR BLD: 71 % (ref 32–75)
NRBC # BLD: 0 K/UL (ref 0–0.01)
NRBC BLD-RTO: 0 PER 100 WBC
P-R INTERVAL, ECG05: 146 MS
PLATELET # BLD AUTO: 264 K/UL (ref 150–400)
PMV BLD AUTO: 9.7 FL (ref 8.9–12.9)
POTASSIUM BLD-SCNC: 3.5 MMOL/L (ref 3.5–5.1)
POTASSIUM SERPL-SCNC: 3.8 MMOL/L (ref 3.5–5.1)
PROT SERPL-MCNC: 7.4 G/DL (ref 6.4–8.2)
PROTHROMBIN TIME: 10.3 SEC (ref 9–11.1)
Q-T INTERVAL, ECG07: 344 MS
QRS DURATION, ECG06: 78 MS
QTC CALCULATION (BEZET), ECG08: 387 MS
RBC # BLD AUTO: 4.34 M/UL (ref 3.8–5.2)
SERVICE CMNT-IMP: ABNORMAL
SODIUM BLD-SCNC: 138 MMOL/L (ref 136–145)
SODIUM SERPL-SCNC: 136 MMOL/L (ref 136–145)
TROPONIN I BLD-MCNC: <0.04 NG/ML (ref 0–0.08)
VENTRICULAR RATE, ECG03: 76 BPM
WBC # BLD AUTO: 10.3 K/UL (ref 3.6–11)

## 2019-03-26 PROCEDURE — 36415 COLL VENOUS BLD VENIPUNCTURE: CPT

## 2019-03-26 PROCEDURE — 93005 ELECTROCARDIOGRAM TRACING: CPT

## 2019-03-26 PROCEDURE — 75810000275 HC EMERGENCY DEPT VISIT NO LEVEL OF CARE

## 2019-03-26 PROCEDURE — 80053 COMPREHEN METABOLIC PANEL: CPT

## 2019-03-26 PROCEDURE — 85610 PROTHROMBIN TIME: CPT

## 2019-03-26 PROCEDURE — 99284 EMERGENCY DEPT VISIT MOD MDM: CPT

## 2019-03-26 PROCEDURE — 85025 COMPLETE CBC W/AUTO DIFF WBC: CPT

## 2019-03-26 PROCEDURE — 85379 FIBRIN DEGRADATION QUANT: CPT

## 2019-03-26 PROCEDURE — 84484 ASSAY OF TROPONIN QUANT: CPT

## 2019-03-26 PROCEDURE — 74011250637 HC RX REV CODE- 250/637: Performed by: PHYSICIAN ASSISTANT

## 2019-03-26 PROCEDURE — 80047 BASIC METABLC PNL IONIZED CA: CPT

## 2019-03-26 RX ORDER — ACETAMINOPHEN 500 MG
1000 TABLET ORAL
Status: COMPLETED | OUTPATIENT
Start: 2019-03-26 | End: 2019-03-26

## 2019-03-26 RX ORDER — ACETAMINOPHEN 500 MG
1000 TABLET ORAL ONCE
Status: DISCONTINUED | OUTPATIENT
Start: 2019-03-26 | End: 2019-03-26 | Stop reason: HOSPADM

## 2019-03-26 RX ADMIN — ALUMINUM HYDROXIDE AND MAGNESIUM HYDROXIDE 30 ML: 200; 200 SUSPENSION ORAL at 21:12

## 2019-03-26 RX ADMIN — ACETAMINOPHEN 1000 MG: 500 TABLET ORAL at 21:12

## 2019-03-27 LAB
ATRIAL RATE: 76 BPM
CALCULATED P AXIS, ECG09: 43 DEGREES
CALCULATED R AXIS, ECG10: 13 DEGREES
CALCULATED T AXIS, ECG11: 37 DEGREES
DIAGNOSIS, 93000: NORMAL
P-R INTERVAL, ECG05: 150 MS
Q-T INTERVAL, ECG07: 362 MS
QRS DURATION, ECG06: 76 MS
QTC CALCULATION (BEZET), ECG08: 407 MS
VENTRICULAR RATE, ECG03: 76 BPM

## 2019-03-27 NOTE — ED PROVIDER NOTES
34 y.o. female with past medical history significant for Costochondritis who presents ambulatory with chief complaint of chest pain. Pt states at 2200 last night she had the onset of constant sharp, mid sternal chest pain with associated shortness of breath. Pt notes she was laying down in bed at the time of onset. PT notes prior to laying down for bed at 2130 last night, her last meal was a 1900. She reports mid sternal soreness with taking deep breaths, but reports the pain is not exacerbated with eating or drinking. Pt also notes nausea and epigastric discomfort earlier today, prompting her visit to the Ed. She notes a cough a couple days ago, which has since subsided. Of note, the pt is currently 19 weeks and 1 day pregnant and is followed by Dr. Mo Fitzpatrick (OBGYN) for prenatal care. She notes normal vaginal discharge at baseline. Pt denies taking any medications at home to modify her pain. Pt denies any recent long distance trips / travel. Pt specifically denies any headaches, diaphoresis, numbness, tingling, lightheadedness, dizziness, vaginal bleeding, loss of fluid, fever, chills, congestion, abdominal cramping, vomiting, diarrhea, dysuria, or urinary frequency. There are no other acute medical concerns at this time. Social Hx: denies tobacco use, denies EtOH use, denies Illicit Drug use    PCP: None  OB/GYN: Mo Fitzpatrick MD    Note written by Bushra Padron, as dictated by Berta Blake PA-C 8:33 PM    The history is provided by the patient. No  was used.         Past Medical History:   Diagnosis Date    Costochondritis        Past Surgical History:   Procedure Laterality Date    HX ORTHOPAEDIC      Right fourth finger, \"Digit widgit\"         Family History:   Family history unknown: Yes       Social History     Socioeconomic History    Marital status: SINGLE     Spouse name: Not on file    Number of children: Not on file    Years of education: Not on file    Highest education level: Not on file   Occupational History    Not on file   Social Needs    Financial resource strain: Not on file    Food insecurity:     Worry: Not on file     Inability: Not on file    Transportation needs:     Medical: Not on file     Non-medical: Not on file   Tobacco Use    Smoking status: Former Smoker    Smokeless tobacco: Never Used    Tobacco comment: \"only when I drink\"   Substance and Sexual Activity    Alcohol use: No     Frequency: Never     Comment: Occasionally    Drug use: No    Sexual activity: Never   Lifestyle    Physical activity:     Days per week: Not on file     Minutes per session: Not on file    Stress: Not on file   Relationships    Social connections:     Talks on phone: Not on file     Gets together: Not on file     Attends [employer] service: Not on file     Active member of club or organization: Not on file     Attends meetings of clubs or organizations: Not on file     Relationship status: Not on file    Intimate partner violence:     Fear of current or ex partner: Not on file     Emotionally abused: Not on file     Physically abused: Not on file     Forced sexual activity: Not on file   Other Topics Concern    Not on file   Social History Narrative    Not on file         ALLERGIES: Motrin [ibuprofen]    Review of Systems   Constitutional: Negative for chills, diaphoresis and fever. HENT: Negative for congestion. Respiratory: Positive for shortness of breath. Negative for cough. Cardiovascular: Positive for chest pain. Gastrointestinal: Positive for abdominal pain and nausea. Negative for diarrhea and vomiting. Genitourinary: Negative for dysuria, frequency and vaginal bleeding. Neurological: Negative for dizziness, light-headedness, numbness and headaches. All other systems reviewed and are negative.       Vitals:    03/26/19 1909   SpO2: 99%            Physical Exam   Constitutional: She is oriented to person, place, and time. She appears well-developed and well-nourished. No distress. Well appearing AAF in NAD   HENT:   Head: Normocephalic and atraumatic. Left Ear: External ear normal.   Eyes: Pupils are equal, round, and reactive to light. Conjunctivae are normal.   Neck: Normal range of motion. Neck supple. Cardiovascular: Normal rate, regular rhythm and normal heart sounds. midsternal chest tenderness   Pulmonary/Chest: Effort normal. No respiratory distress. She has no wheezes. Abdominal: Soft. Bowel sounds are normal. She exhibits no distension. There is no tenderness. There is no rebound. Musculoskeletal: Normal range of motion. Neurological: She is alert and oriented to person, place, and time. Skin: Skin is warm and dry. No ecchymosis, no laceration and no lesion noted. Nursing note and vitals reviewed. MDM  Number of Diagnoses or Management Options  Chest pain, unspecified type:   Diagnosis management comments: 33 yo female with complaint of CP since last night. Atypical for ACS but will eval.  Doubt PE. ? GERd vs chest wall pain  Plan  EKG  Chest xray  Trop  D dimer  Chem 8  Analgesia. [place], [place]         Amount and/or Complexity of Data Reviewed  Clinical lab tests: ordered and reviewed           Procedures    Progress note    Labs reviewed. D dimer 0.55, EKG NSR without ectopy. Pt refused chest xray. Suspect GERD vs chest wall pain. [place], [place]    Patient's results have been reviewed with them. Patient and/or family have verbally conveyed their understanding and agreement of the patient's signs, symptoms, diagnosis, treatment and prognosis and additionally agree to follow up as recommended or return to the Emergency Room should their condition change prior to follow-up.   Discharge instructions have also been provided to the patient with some educational information regarding their diagnosis as well a list of reasons why they would want to return to the ER prior to their follow-up appointment should their condition change.  Lisa Johnsonma

## 2019-03-27 NOTE — DISCHARGE INSTRUCTIONS

## 2019-03-27 NOTE — ED NOTES
Discharge instructions given to patient by PA. Pt has been given counseling on medication use and verbalizes understanding. IV d/c. Pt ambulated off of unit in no signs of distress.

## 2019-05-26 ENCOUNTER — HOSPITAL ENCOUNTER (EMERGENCY)
Age: 29
Discharge: HOME | End: 2019-05-26
Attending: OBSTETRICS & GYNECOLOGY | Admitting: OBSTETRICS & GYNECOLOGY
Payer: MEDICAID

## 2019-05-26 VITALS
BODY MASS INDEX: 32.49 KG/M2 | WEIGHT: 207 LBS | HEART RATE: 78 BPM | OXYGEN SATURATION: 97 % | DIASTOLIC BLOOD PRESSURE: 89 MMHG | RESPIRATION RATE: 18 BRPM | TEMPERATURE: 98.5 F | SYSTOLIC BLOOD PRESSURE: 111 MMHG | HEIGHT: 67 IN

## 2019-05-26 LAB
ALBUMIN SERPL-MCNC: 2.9 G/DL (ref 3.5–5)
ALBUMIN/GLOB SERPL: 0.7 {RATIO} (ref 1.1–2.2)
ALP SERPL-CCNC: 64 U/L (ref 45–117)
ALT SERPL-CCNC: 25 U/L (ref 12–78)
ANION GAP SERPL CALC-SCNC: 8 MMOL/L (ref 5–15)
APPEARANCE UR: ABNORMAL
AST SERPL-CCNC: 17 U/L (ref 15–37)
BACTERIA URNS QL MICRO: ABNORMAL /HPF
BASOPHILS # BLD: 0 K/UL (ref 0–0.1)
BASOPHILS NFR BLD: 0 % (ref 0–1)
BILIRUB SERPL-MCNC: 0.4 MG/DL (ref 0.2–1)
BILIRUB UR QL: NEGATIVE
BUN SERPL-MCNC: 8 MG/DL (ref 6–20)
BUN/CREAT SERPL: 16 (ref 12–20)
CALCIUM SERPL-MCNC: 8.4 MG/DL (ref 8.5–10.1)
CHLORIDE SERPL-SCNC: 108 MMOL/L (ref 97–108)
CO2 SERPL-SCNC: 23 MMOL/L (ref 21–32)
COLOR UR: ABNORMAL
CREAT SERPL-MCNC: 0.49 MG/DL (ref 0.55–1.02)
CREAT UR-MCNC: 188 MG/DL
DIFFERENTIAL METHOD BLD: ABNORMAL
EOSINOPHIL # BLD: 0.1 K/UL (ref 0–0.4)
EOSINOPHIL NFR BLD: 1 % (ref 0–7)
EPITH CASTS URNS QL MICRO: ABNORMAL /LPF
ERYTHROCYTE [DISTWIDTH] IN BLOOD BY AUTOMATED COUNT: 12.2 % (ref 11.5–14.5)
GLOBULIN SER CALC-MCNC: 3.9 G/DL (ref 2–4)
GLUCOSE SERPL-MCNC: 85 MG/DL (ref 65–100)
GLUCOSE UR STRIP.AUTO-MCNC: NEGATIVE MG/DL
HCT VFR BLD AUTO: 34.4 % (ref 35–47)
HGB BLD-MCNC: 11.3 G/DL (ref 11.5–16)
HGB UR QL STRIP: NEGATIVE
IMM GRANULOCYTES # BLD AUTO: 0.1 K/UL (ref 0–0.04)
IMM GRANULOCYTES NFR BLD AUTO: 1 % (ref 0–0.5)
KETONES UR QL STRIP.AUTO: NEGATIVE MG/DL
LEUKOCYTE ESTERASE UR QL STRIP.AUTO: ABNORMAL
LYMPHOCYTES # BLD: 1.8 K/UL (ref 0.8–3.5)
LYMPHOCYTES NFR BLD: 18 % (ref 12–49)
MCH RBC QN AUTO: 27.3 PG (ref 26–34)
MCHC RBC AUTO-ENTMCNC: 32.8 G/DL (ref 30–36.5)
MCV RBC AUTO: 83.1 FL (ref 80–99)
MONOCYTES # BLD: 0.8 K/UL (ref 0–1)
MONOCYTES NFR BLD: 9 % (ref 5–13)
NEUTS SEG # BLD: 7.1 K/UL (ref 1.8–8)
NEUTS SEG NFR BLD: 71 % (ref 32–75)
NITRITE UR QL STRIP.AUTO: NEGATIVE
NRBC # BLD: 0 K/UL (ref 0–0.01)
NRBC BLD-RTO: 0 PER 100 WBC
PH UR STRIP: 6.5 [PH] (ref 5–8)
PLATELET # BLD AUTO: 226 K/UL (ref 150–400)
PMV BLD AUTO: 10 FL (ref 8.9–12.9)
POTASSIUM SERPL-SCNC: 3.6 MMOL/L (ref 3.5–5.1)
PROT SERPL-MCNC: 6.8 G/DL (ref 6.4–8.2)
PROT UR STRIP-MCNC: NEGATIVE MG/DL
PROT UR-MCNC: 16 MG/DL (ref 0–11.9)
PROT/CREAT UR-RTO: 0.1
RBC # BLD AUTO: 4.14 M/UL (ref 3.8–5.2)
RBC #/AREA URNS HPF: ABNORMAL /HPF (ref 0–5)
SODIUM SERPL-SCNC: 139 MMOL/L (ref 136–145)
SP GR UR REFRACTOMETRY: 1.02 (ref 1–1.03)
UA: UC IF INDICATED,UAUC: ABNORMAL
UROBILINOGEN UR QL STRIP.AUTO: 1 EU/DL (ref 0.2–1)
WBC # BLD AUTO: 9.9 K/UL (ref 3.6–11)
WBC URNS QL MICRO: ABNORMAL /HPF (ref 0–4)

## 2019-05-26 PROCEDURE — 59025 FETAL NON-STRESS TEST: CPT

## 2019-05-26 PROCEDURE — 99285 EMERGENCY DEPT VISIT HI MDM: CPT

## 2019-05-26 PROCEDURE — 87086 URINE CULTURE/COLONY COUNT: CPT

## 2019-05-26 PROCEDURE — 80053 COMPREHEN METABOLIC PANEL: CPT

## 2019-05-26 PROCEDURE — 84156 ASSAY OF PROTEIN URINE: CPT

## 2019-05-26 PROCEDURE — 81001 URINALYSIS AUTO W/SCOPE: CPT

## 2019-05-26 PROCEDURE — 74011250637 HC RX REV CODE- 250/637: Performed by: OBSTETRICS & GYNECOLOGY

## 2019-05-26 PROCEDURE — 85025 COMPLETE CBC W/AUTO DIFF WBC: CPT

## 2019-05-26 PROCEDURE — 75810000275 HC EMERGENCY DEPT VISIT NO LEVEL OF CARE

## 2019-05-26 PROCEDURE — 36415 COLL VENOUS BLD VENIPUNCTURE: CPT

## 2019-05-26 RX ORDER — ACETAMINOPHEN 325 MG/1
650 TABLET ORAL
Status: COMPLETED | OUTPATIENT
Start: 2019-05-26 | End: 2019-05-26

## 2019-05-26 RX ADMIN — ACETAMINOPHEN 650 MG: 325 TABLET ORAL at 20:45
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
## 2019-05-27 NOTE — DISCHARGE INSTRUCTIONS
DECREASED FETAL MOVEMENT DISCHARGE INSTRUCTIONS    Name: Janith Klinefelter  YOB: 1990  Primary Diagnosis: Active Problems:    * No active hospital problems. *      Introduction: You came to the hospital because your baby is not moving as much as usual. This information may help you decide when you need to call your care provider and return to the hospital. There are several possible reasons your baby's movements have decreased. Sometimes, the baby is simply asleep. Many times the baby simply needs extra fluids. You can provide this by lying down on your side (to increase blood flow to the womb) and drinking a large glass of fluid. If this does not make the baby move four times in one hour, you need to contact your care provider as soon as possible. General:     ***    Diet/Diet Restrictions:      As Tolerated    Physical Activity / Restrictions / Safety:     As tolerated. Other:        Discharge Instructions/ Special Treatment/ Home Care Needs:     Call your provider if:   Your baby is not moving as much as usual-at least 4 fetal movements in 1 hour after drinking and resting on your side for 1 hour.  You have regular, painful contractions every 5 minutes or less for one hour. Time your contractions from the beginning of one to the beginning of the next.  You have a gush of fluid or blood from your vagina (it is normal to have spotting after vaginal exam or intercourse).    Other:    labor instructions:    Uterine cramping (menstrual-like cramps, intermittent or constant   Uterine contractions every 10-15 minutes or more frequently   Low abdominal pressure (pelvic pressure)   Dull low backache (intermittent or constant)   Increase or change in vaginal discharge   Feeling that the baby is \"pushing down\"   Abdominal cramping with or without diarrhea  If any of these symptoms are experienced, stop what you are doing, lie down on your side, drink two to three glasses of water and wait one hour. If the symptoms persist or get worse, call your provider. Pain Management:             Discharge Checklist-NURSING TO COMPLETE:     Date and Time of Discharge: Date: 5/26/2019 Time: 10:09 PM    Return of:   Dental Appliance:    Vision:    Hearing Aid:    Jewelry:    Clothing:    Other Valuables:    Valuables sent to safe:      Prescription Given: No  Medication Instruction Sheet(s), N/A    Accompanied By: Mother    Mode of Transportation: Ambulatory    Discharge Disposition: A&O    I have had the opportunity to make my options or choices for discharge. I have received and understand these instructions.

## 2019-05-28 LAB
BACTERIA SPEC CULT: NORMAL
CC UR VC: NORMAL
SERVICE CMNT-IMP: NORMAL

## 2019-07-22 LAB — GRBS, EXTERNAL: NORMAL

## 2019-08-17 ENCOUNTER — HOSPITAL ENCOUNTER (INPATIENT)
Age: 29
LOS: 2 days | Discharge: HOME | DRG: 560 | End: 2019-08-19
Attending: OBSTETRICS & GYNECOLOGY | Admitting: OBSTETRICS & GYNECOLOGY
Payer: MEDICAID

## 2019-08-17 ENCOUNTER — ANESTHESIA (OUTPATIENT)
Dept: LABOR AND DELIVERY | Age: 29
DRG: 560 | End: 2019-08-17
Payer: MEDICAID

## 2019-08-17 ENCOUNTER — ANESTHESIA EVENT (OUTPATIENT)
Dept: LABOR AND DELIVERY | Age: 29
DRG: 560 | End: 2019-08-17
Payer: MEDICAID

## 2019-08-17 PROBLEM — Z34.90 PREGNANCY: Status: ACTIVE | Noted: 2019-08-17

## 2019-08-17 LAB
BASOPHILS # BLD: 0 K/UL (ref 0–0.1)
BASOPHILS NFR BLD: 0 % (ref 0–1)
DIFFERENTIAL METHOD BLD: ABNORMAL
EOSINOPHIL # BLD: 0.1 K/UL (ref 0–0.4)
EOSINOPHIL NFR BLD: 1 % (ref 0–7)
ERYTHROCYTE [DISTWIDTH] IN BLOOD BY AUTOMATED COUNT: 13.6 % (ref 11.5–14.5)
HCT VFR BLD AUTO: 39.6 % (ref 35–47)
HGB BLD-MCNC: 12.8 G/DL (ref 11.5–16)
IMM GRANULOCYTES # BLD AUTO: 0.1 K/UL (ref 0–0.04)
IMM GRANULOCYTES NFR BLD AUTO: 1 % (ref 0–0.5)
LYMPHOCYTES # BLD: 1.6 K/UL (ref 0.8–3.5)
LYMPHOCYTES NFR BLD: 15 % (ref 12–49)
MCH RBC QN AUTO: 26.6 PG (ref 26–34)
MCHC RBC AUTO-ENTMCNC: 32.3 G/DL (ref 30–36.5)
MCV RBC AUTO: 82.2 FL (ref 80–99)
MONOCYTES # BLD: 0.8 K/UL (ref 0–1)
MONOCYTES NFR BLD: 8 % (ref 5–13)
NEUTS SEG # BLD: 8.1 K/UL (ref 1.8–8)
NEUTS SEG NFR BLD: 75 % (ref 32–75)
NRBC # BLD: 0 K/UL (ref 0–0.01)
NRBC BLD-RTO: 0 PER 100 WBC
PLATELET # BLD AUTO: 228 K/UL (ref 150–400)
PMV BLD AUTO: 10.6 FL (ref 8.9–12.9)
RBC # BLD AUTO: 4.82 M/UL (ref 3.8–5.2)
WBC # BLD AUTO: 10.7 K/UL (ref 3.6–11)

## 2019-08-17 PROCEDURE — 74011250637 HC RX REV CODE- 250/637: Performed by: OBSTETRICS & GYNECOLOGY

## 2019-08-17 PROCEDURE — 36415 COLL VENOUS BLD VENIPUNCTURE: CPT

## 2019-08-17 PROCEDURE — 75410000003 HC RECOV DEL/VAG/CSECN EA 0.5 HR

## 2019-08-17 PROCEDURE — 74011250636 HC RX REV CODE- 250/636: Performed by: ANESTHESIOLOGY

## 2019-08-17 PROCEDURE — 74011000250 HC RX REV CODE- 250: Performed by: ANESTHESIOLOGY

## 2019-08-17 PROCEDURE — 77030014125 HC TY EPDRL BBMI -B: Performed by: ANESTHESIOLOGY

## 2019-08-17 PROCEDURE — 75410000000 HC DELIVERY VAGINAL/SINGLE

## 2019-08-17 PROCEDURE — 74011000250 HC RX REV CODE- 250

## 2019-08-17 PROCEDURE — 0UQGXZZ REPAIR VAGINA, EXTERNAL APPROACH: ICD-10-PCS | Performed by: OBSTETRICS & GYNECOLOGY

## 2019-08-17 PROCEDURE — 75410000002 HC LABOR FEE PER 1 HR

## 2019-08-17 PROCEDURE — 59025 FETAL NON-STRESS TEST: CPT

## 2019-08-17 PROCEDURE — 76815 OB US LIMITED FETUS(S): CPT

## 2019-08-17 PROCEDURE — 99285 EMERGENCY DEPT VISIT HI MDM: CPT

## 2019-08-17 PROCEDURE — 74011250636 HC RX REV CODE- 250/636

## 2019-08-17 PROCEDURE — 74011250636 HC RX REV CODE- 250/636: Performed by: OBSTETRICS & GYNECOLOGY

## 2019-08-17 PROCEDURE — 85025 COMPLETE CBC W/AUTO DIFF WBC: CPT

## 2019-08-17 PROCEDURE — 65410000002 HC RM PRIVATE OB

## 2019-08-17 PROCEDURE — 76060000078 HC EPIDURAL ANESTHESIA

## 2019-08-17 RX ORDER — HYDROCORTISONE ACETATE PRAMOXINE HCL 2.5; 1 G/100G; G/100G
CREAM TOPICAL AS NEEDED
Status: DISCONTINUED | OUTPATIENT
Start: 2019-08-17 | End: 2019-08-19 | Stop reason: HOSPADM

## 2019-08-17 RX ORDER — LIDOCAINE HYDROCHLORIDE 10 MG/ML
INJECTION, SOLUTION EPIDURAL; INFILTRATION; INTRACAUDAL; PERINEURAL
Status: DISPENSED
Start: 2019-08-17 | End: 2019-08-18

## 2019-08-17 RX ORDER — SIMETHICONE 80 MG
80 TABLET,CHEWABLE ORAL
Status: DISCONTINUED | OUTPATIENT
Start: 2019-08-17 | End: 2019-08-19 | Stop reason: HOSPADM

## 2019-08-17 RX ORDER — BUPIVACAINE HYDROCHLORIDE AND EPINEPHRINE 2.5; 5 MG/ML; UG/ML
INJECTION, SOLUTION EPIDURAL; INFILTRATION; INTRACAUDAL; PERINEURAL AS NEEDED
Status: DISCONTINUED | OUTPATIENT
Start: 2019-08-17 | End: 2019-08-17 | Stop reason: HOSPADM

## 2019-08-17 RX ORDER — LIDOCAINE HYDROCHLORIDE 10 MG/ML
INJECTION INFILTRATION; PERINEURAL AS NEEDED
Status: DISCONTINUED | OUTPATIENT
Start: 2019-08-17 | End: 2019-08-17 | Stop reason: HOSPADM

## 2019-08-17 RX ORDER — DIPHENHYDRAMINE HCL 25 MG
25 CAPSULE ORAL
Status: DISCONTINUED | OUTPATIENT
Start: 2019-08-17 | End: 2019-08-19 | Stop reason: HOSPADM

## 2019-08-17 RX ORDER — EPHEDRINE SULFATE/0.9% NACL/PF 50 MG/5 ML
10 SYRINGE (ML) INTRAVENOUS
Status: DISCONTINUED | OUTPATIENT
Start: 2019-08-17 | End: 2019-08-18 | Stop reason: HOSPADM

## 2019-08-17 RX ORDER — FENTANYL/BUPIVACAINE/NS/PF 2-1250MCG
1-16 PREFILLED PUMP RESERVOIR EPIDURAL CONTINUOUS
Status: DISCONTINUED | OUTPATIENT
Start: 2019-08-17 | End: 2019-08-18 | Stop reason: HOSPADM

## 2019-08-17 RX ORDER — FENTANYL/BUPIVACAINE/NS/PF 2-1250MCG
PREFILLED PUMP RESERVOIR EPIDURAL
Status: COMPLETED
Start: 2019-08-17 | End: 2019-08-17

## 2019-08-17 RX ORDER — BUPIVACAINE HYDROCHLORIDE AND EPINEPHRINE 2.5; 5 MG/ML; UG/ML
INJECTION, SOLUTION EPIDURAL; INFILTRATION; INTRACAUDAL; PERINEURAL
Status: COMPLETED
Start: 2019-08-17 | End: 2019-08-17

## 2019-08-17 RX ORDER — ACETAMINOPHEN 325 MG/1
650 TABLET ORAL
Status: DISCONTINUED | OUTPATIENT
Start: 2019-08-17 | End: 2019-08-19 | Stop reason: HOSPADM

## 2019-08-17 RX ORDER — NALOXONE HYDROCHLORIDE 0.4 MG/ML
0.4 INJECTION, SOLUTION INTRAMUSCULAR; INTRAVENOUS; SUBCUTANEOUS AS NEEDED
Status: DISCONTINUED | OUTPATIENT
Start: 2019-08-17 | End: 2019-08-17

## 2019-08-17 RX ORDER — SODIUM CHLORIDE 0.9 % (FLUSH) 0.9 %
5-40 SYRINGE (ML) INJECTION EVERY 8 HOURS
Status: DISCONTINUED | OUTPATIENT
Start: 2019-08-17 | End: 2019-08-18 | Stop reason: HOSPADM

## 2019-08-17 RX ORDER — SODIUM CHLORIDE, SODIUM LACTATE, POTASSIUM CHLORIDE, CALCIUM CHLORIDE 600; 310; 30; 20 MG/100ML; MG/100ML; MG/100ML; MG/100ML
125 INJECTION, SOLUTION INTRAVENOUS CONTINUOUS
Status: DISCONTINUED | OUTPATIENT
Start: 2019-08-17 | End: 2019-08-19 | Stop reason: HOSPADM

## 2019-08-17 RX ORDER — SODIUM CHLORIDE 0.9 % (FLUSH) 0.9 %
SYRINGE (ML) INJECTION
Status: DISPENSED
Start: 2019-08-17 | End: 2019-08-17

## 2019-08-17 RX ORDER — SODIUM CHLORIDE 0.9 % (FLUSH) 0.9 %
5-40 SYRINGE (ML) INJECTION AS NEEDED
Status: DISCONTINUED | OUTPATIENT
Start: 2019-08-17 | End: 2019-08-18 | Stop reason: HOSPADM

## 2019-08-17 RX ORDER — OXYCODONE AND ACETAMINOPHEN 5; 325 MG/1; MG/1
1 TABLET ORAL
Status: DISCONTINUED | OUTPATIENT
Start: 2019-08-17 | End: 2019-08-19 | Stop reason: HOSPADM

## 2019-08-17 RX ORDER — NALOXONE HYDROCHLORIDE 0.4 MG/ML
0.4 INJECTION, SOLUTION INTRAMUSCULAR; INTRAVENOUS; SUBCUTANEOUS AS NEEDED
Status: DISCONTINUED | OUTPATIENT
Start: 2019-08-17 | End: 2019-08-18 | Stop reason: HOSPADM

## 2019-08-17 RX ORDER — OXYTOCIN/RINGER'S LACTATE 20/1000 ML
PLASTIC BAG, INJECTION (ML) INTRAVENOUS
Status: COMPLETED
Start: 2019-08-17 | End: 2019-08-17

## 2019-08-17 RX ORDER — OXYTOCIN/RINGER'S LACTATE 20/1000 ML
125-500 PLASTIC BAG, INJECTION (ML) INTRAVENOUS ONCE
Status: COMPLETED | OUTPATIENT
Start: 2019-08-17 | End: 2019-08-17

## 2019-08-17 RX ORDER — BISACODYL 5 MG
5 TABLET, DELAYED RELEASE (ENTERIC COATED) ORAL DAILY PRN
Status: DISCONTINUED | OUTPATIENT
Start: 2019-08-17 | End: 2019-08-19 | Stop reason: HOSPADM

## 2019-08-17 RX ORDER — IBUPROFEN 400 MG/1
800 TABLET ORAL EVERY 8 HOURS
Status: DISCONTINUED | OUTPATIENT
Start: 2019-08-17 | End: 2019-08-18

## 2019-08-17 RX ORDER — ONDANSETRON 4 MG/1
4 TABLET, ORALLY DISINTEGRATING ORAL
Status: ACTIVE | OUTPATIENT
Start: 2019-08-17 | End: 2019-08-18

## 2019-08-17 RX ORDER — SODIUM CHLORIDE 0.9 % (FLUSH) 0.9 %
5-40 SYRINGE (ML) INJECTION AS NEEDED
Status: DISCONTINUED | OUTPATIENT
Start: 2019-08-17 | End: 2019-08-18 | Stop reason: SDUPTHER

## 2019-08-17 RX ORDER — SODIUM CHLORIDE 0.9 % (FLUSH) 0.9 %
5-40 SYRINGE (ML) INJECTION EVERY 8 HOURS
Status: DISCONTINUED | OUTPATIENT
Start: 2019-08-17 | End: 2019-08-18 | Stop reason: SDUPTHER

## 2019-08-17 RX ADMIN — BUPIVACAINE HYDROCHLORIDE AND EPINEPHRINE 3 ML: 2.5; 5 INJECTION, SOLUTION EPIDURAL; INFILTRATION; INTRACAUDAL; PERINEURAL at 15:55

## 2019-08-17 RX ADMIN — LIDOCAINE HYDROCHLORIDE 3 ML: 10 INJECTION, SOLUTION INFILTRATION; PERINEURAL at 15:47

## 2019-08-17 RX ADMIN — BUPIVACAINE HYDROCHLORIDE AND EPINEPHRINE 3 ML: 2.5; 5 INJECTION, SOLUTION EPIDURAL; INFILTRATION; INTRACAUDAL; PERINEURAL at 15:54

## 2019-08-17 RX ADMIN — Medication 19980 MILLI-UNITS/HR: at 18:45

## 2019-08-17 RX ADMIN — BUPIVACAINE HYDROCHLORIDE AND EPINEPHRINE 4 ML: 2.5; 5 INJECTION, SOLUTION EPIDURAL; INFILTRATION; INTRACAUDAL; PERINEURAL at 15:56

## 2019-08-17 RX ADMIN — SODIUM CHLORIDE, SODIUM LACTATE, POTASSIUM CHLORIDE, AND CALCIUM CHLORIDE 125 ML/HR: 600; 310; 30; 20 INJECTION, SOLUTION INTRAVENOUS at 16:04

## 2019-08-17 RX ADMIN — BUPIVACAINE HYDROCHLORIDE AND EPINEPHRINE 3 ML: 2.5; 5 INJECTION, SOLUTION EPIDURAL; INFILTRATION; INTRACAUDAL; PERINEURAL at 15:53

## 2019-08-17 RX ADMIN — ACETAMINOPHEN 650 MG: 325 TABLET ORAL at 20:51

## 2019-08-17 RX ADMIN — SODIUM CHLORIDE, SODIUM LACTATE, POTASSIUM CHLORIDE, AND CALCIUM CHLORIDE 999 ML/HR: 600; 310; 30; 20 INJECTION, SOLUTION INTRAVENOUS at 14:38

## 2019-08-17 RX ADMIN — Medication 12 ML/HR: at 16:10

## 2019-08-17 NOTE — PROGRESS NOTES
Comfortable with epidural.   SROM - clear. Visit Vitals  /75   Pulse 95   Temp 98.6 °F (37 °C)   Resp 16   Ht 5' 7\" (1.702 m)   Wt 105.7 kg (233 lb)   LMP 11/30/2018 (Within Days)   SpO2 98%   Breastfeeding?  No   BMI 36.49 kg/m²     Cvx: 6/90/-1  [place]: Q2-3 min  FHR: 135, category 1    Continue expectant management

## 2019-08-17 NOTE — PROGRESS NOTES
Pt arrived from home with c/o's of ctx's that started yesterday, pt states that she has had some vag bleeding and was seen in office yesterday and they were aware. Pt was check yesterday in office for ctx's and was /-2. Pt is a , 39w5d. Pt placed on monitor, VSS.      0758: Dr. Paulette Morales made aware of pt. 8042: Dr. Luana Garcia in room, ultrasound done for fluid, MANUELITO WNL. 2880: SVE done. Pt cervix 4 cm, will admit pt to room 3167.     1000: pt moved to room 3167.     1045: Dr. Luana Garcia aware that pt is off monitor.

## 2019-08-17 NOTE — L&D DELIVERY NOTE
Delivery Summary    Patient: Samir Door             Circumcision:   desires  Additional Delivery Comments - 33 y/o G1 now  presented on the day of delivery at 39+5 weeks in labor. Labor managed expectantly with SROM of clear fluid. Fetal heart tones were category 1 and category 2 throughout the course of labor. She pushed through 4 contractions to a . The infant delivered in the JAZZY position and restituted to ROT. A loose nuchal cord was reduced. The shoulders delivered spontaneously (left shoulder anterior) without any evidence of shoulder dystocia. The rest of the ensuing body delivered and the baby was placed on the mother's abdomen where he had excellent tone and a vigorous cry. His cord was allowed to continue pulsating before being clamped and cut. A cord blood sample was collected. An intact placenta with a 3 vessel cord delivered spontaneously. Thereafter, IV pitocin and bimanual uterine massage were utilized to prevent uterine atony. Uterine tone was excellent. The cervix and sulci were inspected and appeared to be intact. The perineum was also found to be intact. A supraurethral laceration was repaired with 4-0 vicryl in a running fashion after injection 1% lidocaine locally. A small right vaginal laceration was repaired with a figure-of-eight of 4-0 vicryl. Vaginal exam revealed no evidence of hematoma formation or foreign bodies. Sponge and sharps count was correct. The procedure was tolerated adequately by the patient and she is recovering in stable condition. Baby boy Brian 7lb 8.1oz    Information for the patient's :  Gabo Connelly [[id number]]       Labor Events:    Labor: No    Steroids: None   Cervical Ripening Date/Time:       Cervical Ripening Type: None   Antibiotics During Labor: No   Rupture Identifier:      Rupture Date/Time: 2019 3:45 PM   Rupture Type: SROM   Amniotic Fluid Volume:  Moderate    Amniotic Fluid Description: Clear    Amniotic Fluid Odor:      Induction: None       Induction Date/Time:        Indications for Induction:      Augmentation: None   Augmentation Date/Time:      Indications for Augmentation:     Labor complications: None       Additional complications:        Delivery Events:  Indications For Episiotomy:     Episiotomy: None   Perineal Laceration(s): None   Repaired:     Periurethral Laceration Location:      Repaired: Yes   Labial Laceration Location:     Repaired:     Sulcal Laceration Location:     Repaired:     Vaginal Laceration Location:     Repaired: Yes   Cervical Laceration Location:     Repaired:     Repair Suture:     Number of Repair Packets: 1   Estimated Blood Loss (ml): 400ml     Delivery Date: 2019    Delivery Time: 6:23 PM  Delivery Type: Vaginal, Spontaneous  Sex:  Male    Gestational Age: 38w11d   Delivery Clinician:  Coby Roe  Living Status: Living   Delivery Location: L&D            APGARS  One minute Five minutes Ten minutes   Skin color: 0   1        Heart rate: 2   2        Grimace: 2   2        Muscle tone: 2   2        Breathin   2        Totals: 8   9            Presentation: Vertex    Position: Right Occiput Anterior  Resuscitation Method:  Suctioning-bulb; Tactile Stimulation     Meconium Stained: None      Cord Information: 3 Vessels  Complications: Nuchal Cord With Compressions  Cord around: head  Delayed cord clamping?  Yes  Cord clamped date/time:   Disposition of Cord Blood: Lab    Blood Gases Sent?: No    Placenta:  Date/Time: 2019  6:33 PM  Removal: Spontaneous      Appearance: Intact     Drums Measurements:  Birth Weight: 3.405 kg      Birth Length: 50.8 cm      Head Circumference: 33 cm      Chest Circumference: 34 cm     Abdominal Girth: 30 cm    Other Providers:   DANIEL Wray;;;;;;;;;MANOLO BAKER;JEANMARIE RAMÍREZ;CASSIE GATES Obstetrician;Primary Nurse;Primary Drums Nurse;Nicu Nurse;Neonatologist;Anesthesiologist;Crna;Nurse Practitioner;Midwife;Nursery Nurse           Cord pH:  none    Episiotomy: None   Laceration(s): None     Estimated Blood Loss (ml): 400    Labor Events  Method: None      Augmentation: None   Cervical Ripening:       None        Hospital Problems  Date Reviewed: 2019          Codes Class Noted POA    Pregnancy ICD-10-CM: Z34.90  ICD-9-CM: V22.2  2019 Unknown              Operative Vaginal Delivery - none    Group B Strep:   Lab Results   Component Value Date/Time    GrBStrep, External neg 2019     Information for the patient's :  Cinthia Garza [[id number]]   No results found for: ABORH, PCTABR, PCTDIG, BILI, ABORHEXT, ABORH    No results found for: APH, APCO2, APO2, AHCO3, ABEC, ABDC, O2ST, EPHV, PCO2V, PO2V, HCO3V, EBEV, EBDV, SITE, RSCOM

## 2019-08-17 NOTE — ANESTHESIA PREPROCEDURE EVALUATION
Relevant Problems   No relevant active problems       Anesthetic History   No history of anesthetic complications            Review of Systems / Medical History  Patient summary reviewed, nursing notes reviewed and pertinent labs reviewed    Pulmonary          Smoker      Comments: Former smoker   Neuro/Psych   Within defined limits           Cardiovascular  Within defined limits                Exercise tolerance: >4 METS     GI/Hepatic/Renal  Within defined limits              Endo/Other        Obesity and anemia     Other Findings   Comments: Costochondritis           Physical Exam    Airway  Mallampati: I  TM Distance: > 6 cm  Neck ROM: normal range of motion   Mouth opening: Normal     Cardiovascular    Rhythm: regular  Rate: normal         Dental  No notable dental hx       Pulmonary  Breath sounds clear to auscultation               Abdominal  GI exam deferred       Other Findings            Anesthetic Plan    ASA: 2  Anesthesia type: epidural      Post-op pain plan if not by surgeon: indwelling epidural catheter      Anesthetic plan and risks discussed with: Patient and Spouse

## 2019-08-17 NOTE — PROGRESS NOTES
Assuming care of this 35 y/o  @ 39+5 weeks who presented c/o vaginal bleeding and contractions. Per Dr. Cindy Hernandez, cervix /-2 and posterior. C/o ongoing contractions.      TAUS: MANUELITO 9.05cm, vertex (OP)    Cvx: 90/-2  [place]: Q2-3 min  FHR: 135, category 1    Admit for expectant management of active labor  GBS negative

## 2019-08-17 NOTE — H&P
History & Physical    Name: Faby Marino MRN: [id number]  SSN: [id number]    YOB: 1990  Age: 34 y.o. Sex: female        Subjective:   CC: abdominal pain and cramping  Estimated Date of Delivery: 19  OB History    Para Term  AB Living   1             SAB TAB Ectopic Molar Multiple Live Births                    # Outcome Date GA Lbr Nitin/2nd Weight Sex Delivery Anes PTL Lv   1 Current                Ms. Alicja Jonas presents with pregnancy at 39w5d for c/o mild vaginal bleeding and contractions since yesterday for which she was evaluated in the [facility] office and found to be 2/60%. Contractions are described as painful,global,rhythmic,non radiating, and getting worse. pt was also r/o for ROM yesterday and c/o decrease FM at that time but R/O for ROM and had a RNST. FM is good now and she has had no ROM symptoms. . Prenatal course was complicated by anemia. Please see prenatal records for details. Past Medical History:   Diagnosis Date    Anemia     Costochondritis      Past Surgical History:   Procedure Laterality Date    HX ORTHOPAEDIC      Right fourth finger, \"Digit widgit\"     Social History     Occupational History    Not on file   Tobacco Use    Smoking status: Former Smoker    Smokeless tobacco: Never Used    Tobacco comment: \"only when I drink\"   Substance and Sexual Activity    Alcohol use: No     Frequency: Never     Comment: Occasionally    Drug use: No    Sexual activity: Yes     Partners: Male     Family History   Family history unknown: Yes   Notable for DM and CHTN in a first order relative. Allergies   Allergen Reactions    Motrin [Ibuprofen] Hives     Prior to Admission medications    Medication Sig Start Date End Date Taking? Authorizing Provider   PNV No12-Iron-FA-DSS-OM-3 29 mg iron-1 mg -50 mg CPKD Take 1 Tab by mouth daily.  Indications: pregnancy   Yes Provider, Historical        Review of Systems   Constitutional: Negative for chills, diaphoresis and fever.   HENT: Negative for congestion, rhinorrhea and sore throat. Eyes: Negative for photophobia and visual disturbance. Respiratory: Negative for cough, chest tightness, shortness of breath and wheezing. Cardiovascular: Negative for chest pain, palpitations and leg swelling. Gastrointestinal: Positive for constipation and nausea. Negative for anal bleeding, blood in stool, diarrhea and vomiting. Genitourinary: Positive for vaginal bleeding. Negative for dysuria, frequency, genital sores, hematuria, urgency, vaginal discharge and vaginal pain. Musculoskeletal: Negative for arthralgias, back pain, myalgias and neck pain. Skin: Negative for color change, pallor and rash. Neurological: Negative for dizziness, syncope and headaches. Psychiatric/Behavioral: Negative for agitation, behavioral problems, confusion and decreased concentration. Objective:     Vitals:  Vitals:    08/17/19 0745 08/17/19 0746   BP: 120/77    Pulse: 99    Resp: 16    Temp: 98.5 °F (36.9 °C)    SpO2: 99%    Weight:  105.7 kg (233 lb)   Height:  5' 7\" (1.702 m)        Physical Exam   Constitutional: She is oriented to person, place, and time. She appears well-developed and well-nourished. No distress. HENT:   Head: Normocephalic and atraumatic. Eyes: EOM are normal. Right eye exhibits no discharge. Left eye exhibits no discharge. No scleral icterus. Neck: Normal range of motion. Neck supple. No JVD present. No tracheal deviation present. No thyromegaly present. Cardiovascular: Normal rate, regular rhythm and normal heart sounds. Exam reveals no gallop and no friction rub. No murmur heard. Pulmonary/Chest: Effort normal and breath sounds normal. No respiratory distress. She has no wheezes. She has no rales. Abdominal: Soft. Bowel sounds are normal. She exhibits no distension and no mass. There is no tenderness. There is no rebound and no guarding. Genitourinary: Vagina normal. No vaginal discharge found. Genitourinary Comments: Scant blood on exam glove-pink   Musculoskeletal: Normal range of motion. She exhibits no edema, tenderness or deformity. Lymphadenopathy:     She has no cervical adenopathy. Neurological: She is alert and oriented to person, place, and time. She has normal reflexes. She displays normal reflexes. She exhibits normal muscle tone. Skin: Skin is warm and dry. No rash noted. She is not diaphoretic. No erythema. No pallor. Psychiatric: She has a normal mood and affect. Her behavior is normal. Judgment and thought content normal.     Back: neg CVAT  Cervical Exam: 2 cm dilated    70% effaced    -2 station    Cervical Position: posterior  Consistency: Soft  Uterine Activity: Frequency: Every 3-6 minutes   Membranes: Intact  Fetal Heart Rate: Reactive     Prenatal Labs:   Lab Results   Component Value Date/Time    ABO/Rh(D) O POSITIVE 12/23/2018 05:50 PM    Rubella, External Immune 01/09/2019    GrBStrep, External neg 07/22/2019    HBsAg, External neg 01/09/2019    HIV, External non reactive 01/09/2019    Gonorrhea, External neg 01/09/2019    Chlamydia, External neg 01/09/2019    ABO,Rh O pos 01/09/2019          Impression/Plan:   1) false labor v. Latent labor  2)H/O decreased FM yesterday with RNST  3)Anemia       Plan: U/S was out of power. Charging now and Dr Hema Lizarraga will need to do MANUELITO and recheck cervix to see if change.  Plan per result

## 2019-08-18 PROBLEM — Z34.90 PREGNANCY: Status: RESOLVED | Noted: 2019-08-17 | Resolved: 2019-08-18

## 2019-08-18 PROCEDURE — 65410000002 HC RM PRIVATE OB

## 2019-08-18 PROCEDURE — 74011250637 HC RX REV CODE- 250/637: Performed by: OBSTETRICS & GYNECOLOGY

## 2019-08-18 RX ADMIN — ACETAMINOPHEN 650 MG: 325 TABLET ORAL at 07:45

## 2019-08-18 RX ADMIN — ACETAMINOPHEN 650 MG: 325 TABLET ORAL at 02:28

## 2019-08-18 RX ADMIN — ACETAMINOPHEN 650 MG: 325 TABLET ORAL at 14:40

## 2019-08-18 RX ADMIN — ACETAMINOPHEN 650 MG: 325 TABLET ORAL at 22:11

## 2019-08-18 NOTE — PROGRESS NOTES
TRANSFER - IN REPORT:    Verbal report received from MARYJANE KaplanRN(name) on Severo Wynn  being received from L&D(unit) for routine progression of care      Report consisted of patients Situation, Background, Assessment and   Recommendations(SBAR). Information from the following report(s) SBAR, Procedure Summary, Intake/Output, MAR and Recent Results was reviewed with the receiving nurse. Opportunity for questions and clarification was provided. Assessment completed upon patients arrival to unit and care assumed.

## 2019-08-18 NOTE — PROGRESS NOTES
Post-Partum Day Number 1 Progress Note    Luann NNAMDI Alvarado     Assessment: Doing well, post partum day 1    Plan:  1. Continue routine postpartum and perineal care as well as maternal education. 2. The risks and benefits of the circumcision  procedure and anesthesia including: bleeding, infection, variability of cosmetic results were discussed at length with the mother. She is aware that future repeat procedures may be necessary. She gives informed consent to proceed as noted and her questions are answered. Information for the patient's :  Betsy Baldwin [[id number]]   Vaginal, Spontaneous   Patient doing well without significant complaint. Voiding without difficulty, normal lochia. Vitals:  Visit Vitals  /85 (BP 1 Location: Left arm, BP Patient Position: Sitting)   Pulse 90   Temp 97.5 °F (36.4 °C)   Resp 17   Ht 5' 7\" (1.702 m)   Wt 105.7 kg (233 lb)   LMP 2018 (Within Days)   SpO2 98%   Breastfeeding? Unknown   BMI 36.49 kg/m²     Temp (24hrs), Av.8 °F (37.1 °C), Min:97.5 °F (36.4 °C), Max:99.5 °F (37.5 °C)        Exam:   Patient without distress. Abdomen soft, fundus firm, nontender                Perineum with normal lochia noted. Lower extremities are negative for swelling, cords or tenderness.     Labs:     Lab Results   Component Value Date/Time    WBC 10.7 2019 10:36 AM    WBC 9.9 2019 08:24 PM    WBC 10.3 2019 03:38 PM    WBC 10.3 2019 06:39 PM    WBC 7.3 2017 06:24 PM    WBC 9.6 2015 07:54 PM    WBC 15.0 (H) 2014 05:30 PM    HGB 12.8 2019 10:36 AM    HGB 11.3 (L) 2019 08:24 PM    HGB 12.2 2019 03:38 PM    HGB 11.6 2019 06:39 PM    HGB 12.2 2017 06:24 PM    HGB 12.2 2015 07:54 PM    HGB 12.9 2014 05:30 PM    HCT 39.6 2019 10:36 AM    HCT 34.4 (L) 2019 08:24 PM    HCT 36.7 2019 03:38 PM    HCT 35.2 2019 06:39 PM    HCT 37.5 2017 06:24 PM    HCT 37.4 09/07/2015 07:54 PM    HCT 39.4 11/25/2014 05:30 PM    PLATELET 410 16/67/3012 10:36 AM    PLATELET 567 82/91/7010 08:24 PM    PLATELET 125 17/67/6704 03:38 PM    PLATELET 082 69/87/5200 06:39 PM    PLATELET 426 78/94/2907 06:24 PM    PLATELET 997 68/49/0976 07:54 PM    PLATELET 785 71/91/3746 05:30 PM       Recent Results (from the past 24 hour(s))   CBC WITH AUTOMATED DIFF    Collection Time: 08/17/19 10:36 AM   Result Value Ref Range    WBC 10.7 3.6 - 11.0 K/uL    RBC 4.82 3.80 - 5.20 M/uL    HGB 12.8 11.5 - 16.0 g/dL    HCT 39.6 35.0 - 47.0 %    MCV 82.2 80.0 - 99.0 FL    MCH 26.6 26.0 - 34.0 PG    MCHC 32.3 30.0 - 36.5 g/dL    RDW 13.6 11.5 - 14.5 %    PLATELET 930 320 - 982 K/uL    MPV 10.6 8.9 - 12.9 FL    NRBC 0.0 0  WBC    ABSOLUTE NRBC 0.00 0.00 - 0.01 K/uL    NEUTROPHILS 75 32 - 75 %    LYMPHOCYTES 15 12 - 49 %    MONOCYTES 8 5 - 13 %    EOSINOPHILS 1 0 - 7 %    BASOPHILS 0 0 - 1 %    IMMATURE GRANULOCYTES 1 (H) 0.0 - 0.5 %    ABS. NEUTROPHILS 8.1 (H) 1.8 - 8.0 K/UL    ABS. LYMPHOCYTES 1.6 0.8 - 3.5 K/UL    ABS. MONOCYTES 0.8 0.0 - 1.0 K/UL    ABS. EOSINOPHILS 0.1 0.0 - 0.4 K/UL    ABS. BASOPHILS 0.0 0.0 - 0.1 K/UL    ABS. IMM.  GRANS. 0.1 (H) 0.00 - 0.04 K/UL    DF AUTOMATED

## 2019-08-18 NOTE — ROUTINE PROCESS
Bedside and Verbal shift change report given to FERCHO Matute (oncoming nurse) by Mell Royal RN (offgoing nurse). Report included the following information SBAR, Kardex, Procedure Summary, Intake/Output, MAR and Recent Results.

## 2019-08-18 NOTE — ROUTINE PROCESS
Bedside and Verbal shift change report given to ROMAN Azevedo (oncoming nurse) by FERCHO Hernandez RNC-MNN (offgoing nurse). Report included the following information SBAR, Procedure Summary, Intake/Output, MAR and Recent Results.

## 2019-08-18 NOTE — PROGRESS NOTES
Bedside and Verbal shift change report given to RN JOEY Hernandez (oncoming nurse) by Sofía Gay RN  (offgoing nurse). Report given with Radha SOTO and MAR.

## 2019-08-18 NOTE — PROGRESS NOTES
Up with 2 nurses at side to ambulate to bathroom. Voided without difficulty. Oriented to self radha care. Return demonstration WDL. To shower. Gait even and steady. States legs feel tingly bilaterally. Denies light headedness and dizziness. Returned to bed without difficulty. Informed not to attempt to get OOB without nurse at side. Verbalized understanding and willingness.

## 2019-08-18 NOTE — ANESTHESIA POSTPROCEDURE EVALUATION
* No procedures listed *.    epidural    Anesthesia Post Evaluation        Patient location during evaluation: PACU  Note status: Adequate. Level of consciousness: responsive to verbal stimuli and sleepy but conscious  Pain management: satisfactory to patient  Airway patency: patent  Anesthetic complications: no  Cardiovascular status: acceptable  Respiratory status: acceptable  Hydration status: acceptable  Comments: +Post-Anesthesia Evaluation and Assessment    Patient: Jennyfer Owen MRN: [id number]  SSN: [id number]   YOB: 1990  Age: 34 y.o. Sex: female      Cardiovascular Function/Vital Signs    /81 (BP 1 Location: Right arm, BP Patient Position: At rest)   Pulse 91   Temp 36.8 °C (98.3 °F)   Resp 18   Ht 5' 7\" (1.702 m)   Wt 105.7 kg (233 lb)   SpO2 98%   Breastfeeding? Unknown   BMI 36.49 kg/m²     Patient is status post * No procedures listed *. Nausea/Vomiting: Controlled. Postoperative hydration reviewed and adequate. Pain:  Pain Scale 1: Numeric (0 - 10) (08/17/19 1800)  Pain Intensity 1: 0 (08/17/19 1800)   Managed. Neurological Status: At baseline. Mental Status and Level of Consciousness: Arousable. Pulmonary Status:   O2 Device: Room air (08/17/19 0826)   Adequate oxygenation and airway patent. Complications related to anesthesia: None    Post-anesthesia assessment completed. No concerns. Signed By: Nato Schreiber MD    8/17/2019  Post anesthesia nausea and vomiting:  controlled      No vitals data found for the desired time range.

## 2019-08-18 NOTE — PROGRESS NOTES
Pt had dinner,Tylenol given, radha care done and radha pad changed, pt deny the urge to use the BR, will call when needs to, call bell within reach

## 2019-08-19 VITALS
RESPIRATION RATE: 16 BRPM | HEART RATE: 80 BPM | SYSTOLIC BLOOD PRESSURE: 118 MMHG | WEIGHT: 233 LBS | HEIGHT: 67 IN | BODY MASS INDEX: 36.57 KG/M2 | DIASTOLIC BLOOD PRESSURE: 83 MMHG | TEMPERATURE: 98 F | OXYGEN SATURATION: 98 %

## 2019-08-19 PROCEDURE — 74011250637 HC RX REV CODE- 250/637: Performed by: OBSTETRICS & GYNECOLOGY

## 2019-08-19 RX ADMIN — ACETAMINOPHEN 650 MG: 325 TABLET ORAL at 02:32

## 2019-08-19 RX ADMIN — ACETAMINOPHEN 650 MG: 325 TABLET ORAL at 06:41

## 2019-08-19 RX ADMIN — ACETAMINOPHEN 650 MG: 325 TABLET ORAL at 10:39

## 2019-08-19 NOTE — DISCHARGE SUMMARY
Obstetrical Discharge Summary     Name: Melissa Almonte MRN: [id number]  SSN: [id number]    YOB: 1990  Age: 34 y.o. Sex: female      Admit Date: 2019    Discharge Date: 2019     Admitting Physician: Bro Reardon MD     Attending Physician:  Aaliyah Aly MD     Admission Diagnoses: Pregnancy [Z34.90]    Procedure Performed:      Discharge Diagnoses:   Information for the patient's :  Ceola Counts [[id number]]   Delivery of a 3.405 kg male infant via Vaginal, Spontaneous on 2019 at 6:23 PM  by Faraz Soares. Apgars were 8  and 9 . Additional Diagnoses:   Hospital Problems  Date Reviewed: 2019    None         Lab Results   Component Value Date/Time    Rubella, External Immune 2019    GrBStrep, External neg 2019       Hospital Course: Normal hospital course following the delivery. Patient Disposition: Home      Followup Care:  Discharge Condition: good  Activity as tolerated and No sex for 6 weeks  Regular Diet  Keep wound clean and dry    Patient Instructions:   Current Discharge Medication List      CONTINUE these medications which have NOT CHANGED    Details   PNV No12-Iron-FA-DSS-OM-3 29 mg iron-1 mg -50 mg CPKD Take 1 Tab by mouth daily. Indications: pregnancy             Reference my discharge instructions.     Follow-up Appointments   Procedures    FOLLOW UP VISIT Appointment in: 6 Weeks     Standing Status:   Standing     Number of Occurrences:   1     Order Specific Question:   Appointment in     Answer:   6 Weeks        Signed By:  Tom Ricks MD     2019

## 2019-08-19 NOTE — ROUTINE PROCESS
Bedside and Verbal shift change report given to SAM Nance RN (oncoming nurse) by FERCHO Hernandez RNC-MNN (offgoing nurse). Report included the following information SBAR, Procedure Summary, Intake/Output, MAR and Recent Results.

## 2019-08-19 NOTE — PROGRESS NOTES
Discharge instructions given to mother with understanding voiced. 1315: Discharged via w/c with infant and family member. Taken to vehicle by hospital volunteers. Belongings sent with pt.

## 2019-08-19 NOTE — DISCHARGE INSTRUCTIONS
Patient Education        Postpartum: Care Instructions  Your Care Instructions  After childbirth (postpartum period), your body goes through many changes. Some of these changes happen over several weeks. In the hours after delivery, your body will begin to recover from childbirth while it prepares to breastfeed your . You may feel emotional during this time. Your hormones can shift your mood without warning for no clear reason. In the first couple of weeks after childbirth, many women have emotions that change from happy to sad. You may find it hard to sleep. You may cry a lot. This is called the \"baby blues. \" These overwhelming emotions often go away within a couple of days or weeks. But it's important to discuss your feelings with your doctor. It is easy to get too tired and overwhelmed during the first weeks after childbirth. Don't try to do too much. Get rest whenever you can, accept help from others, and eat well and drink plenty of fluids. In the first couple of weeks after giving birth, your doctor or midwife may want to check in with you and make a plan for any follow-up care you may need. You will likely have a complete postpartum visit in the first 3 months after delivery. At that time, your doctor or midwife will check on your recovery from childbirth. He or she will also see how you are doing with your emotions and talk about your concerns or questions. Follow-up care is a key part of your treatment and safety. Be sure to make and go to all appointments, and call your doctor if you are having problems. It's also a good idea to know your test results and keep a list of the medicines you take. How can you care for yourself at home? · Sleep or rest when your baby sleeps. · Get help with household chores from family or friends, if you can. Do not try to do it all yourself. · If you have hemorrhoids or swelling or pain around the opening of your vagina, try using cold and heat.  You can put ice or a cold pack on the area for 10 to 20 minutes at a time. Put a thin cloth between the ice and your skin. Also try sitting in a few inches of warm water (sitz bath) 3 times a day and after bowel movements. · Take pain medicines exactly as directed. ? If the doctor gave you a prescription medicine for pain, take it as prescribed. ? If you are not taking a prescription pain medicine, ask your doctor if you can take an over-the-counter medicine. · Eat more fiber to avoid constipation. Include foods such as whole-grain breads and cereals, raw vegetables, raw and dried fruits, and beans. · Drink plenty of fluids, enough so that your urine is light yellow or clear like water. If you have kidney, heart, or liver disease and have to limit fluids, talk with your doctor before you increase the amount of fluids you drink. · Do not rinse inside your vagina with fluids (douche). · If you have stitches, keep the area clean by pouring or spraying warm water over the area outside your vagina and anus after you use the toilet. · Keep a list of questions to ask your doctor or midwife. Your questions might be about:  ? Changes in your breasts, such as lumps or soreness. ? When to expect your menstrual period to start again. ? What form of birth control is best for you. ? Weight you have put on during the pregnancy. ? Exercise options. ? What foods and drinks are best for you, especially if you are breastfeeding. ? Problems you might be having with breastfeeding. ? When you can have sex. Some women may want to talk about lubricants for the vagina. ? Any feelings of sadness or restlessness that you are having. When should you call for help? [id number] anytime you think you may need emergency care.  For example, call if:    · You have thoughts of harming yourself, your baby, or another person.     · You passed out (lost consciousness).     · You have chest pain, are short of breath, or cough up blood.     · You have a seizure.    Call your doctor now or seek immediate medical care if:    · Your vaginal bleeding seems to be getting heavier.     · You are dizzy or lightheaded, or you feel like you may faint.     · You have a fever.     · You have new or more belly pain.     · You have symptoms of a blood clot in your leg (called a deep vein thrombosis), such as:  ? Pain in the calf, back of the knee, thigh, or groin. ? Redness and swelling in your leg or groin.     · You have signs of preeclampsia, such as:  ? Sudden swelling of your face, hands, or feet. ? New vision problems (such as dimness, blurring, or seeing spots). ? A severe headache.    Watch closely for changes in your health, and be sure to contact your doctor if:    · You have new or worse vaginal discharge.     · You feel sad or depressed.     · You are having problems with your breasts or breastfeeding. Where can you learn more? Go to http://rosemary-rose.info/. Enter Y065 in the search box to learn more about \"Postpartum: Care Instructions. \"  Current as of: September 5, 2018  Content Version: 12.1  © 9962-4901 Healthwise, Incorporated. Care instructions adapted under license by [employer] (which disclaims liability or warranty for this information). If you have questions about a medical condition or this instruction, always ask your healthcare professional. [address] any warranty or liability for your use of this information.

## 2019-08-19 NOTE — PROGRESS NOTES
Post-Partum Day Number 2 Progress Note    Luann Childers     Assessment: Doing well, post partum day 2    Plan:   1. Discharge home today  2. Follow up in office in 6 weeks with Hodan Gallego MD  3. Post partum activity advised, diet as tolerated  4. Discharge Medications: medications prior to admission    Information for the patient's :  Titus Arciniega [[id number]]   Vaginal, Spontaneous   Patient doing well without significant complaint. Voiding without difficulty, normal lochia. Vitals:  Visit Vitals  /83 (BP 1 Location: Left arm, BP Patient Position: Post activity)   Pulse 80   Temp 98 °F (36.7 °C)   Resp 16   Ht 5' 7\" (1.702 m)   Wt 105.7 kg (233 lb)   LMP 2018 (Within Days)   SpO2 98%   Breastfeeding? Unknown   BMI 36.49 kg/m²     Temp (24hrs), Av.6 °F (37 °C), Min:98 °F (36.7 °C), Max:99 °F (37.2 °C)      Exam:         Patient without distress. Abdomen soft, fundus firm, nontender                 Lower extremities are negative for swelling, cords or tenderness.     Labs:     Lab Results   Component Value Date/Time    WBC 10.7 2019 10:36 AM    WBC 9.9 2019 08:24 PM    WBC 10.3 2019 03:38 PM    WBC 10.3 2019 06:39 PM    WBC 7.3 2017 06:24 PM    WBC 9.6 2015 07:54 PM    WBC 15.0 (H) 2014 05:30 PM    HGB 12.8 2019 10:36 AM    HGB 11.3 (L) 2019 08:24 PM    HGB 12.2 2019 03:38 PM    HGB 11.6 2019 06:39 PM    HGB 12.2 2017 06:24 PM    HGB 12.2 2015 07:54 PM    HGB 12.9 2014 05:30 PM    HCT 39.6 2019 10:36 AM    HCT 34.4 (L) 2019 08:24 PM    HCT 36.7 2019 03:38 PM    HCT 35.2 2019 06:39 PM    HCT 37.5 2017 06:24 PM    HCT 37.4 2015 07:54 PM    HCT 39.4 2014 05:30 PM    PLATELET 213  10:36 AM    PLATELET 995  08:24 PM    PLATELET 752  03:38 PM    PLATELET 673 93/15/3365 06:39 PM    PLATELET 082  06:24 PM    PLATELET 442 73/34/8658 07:54 PM    PLATELET 284 42/15/6527 05:30 PM       No results found for this or any previous visit (from the past 24 hour(s)).

## 2019-08-19 NOTE — LACTATION NOTE
This note was copied from a baby's chart. Visited for initial lactation consult. Mother states baby has just fed. Denies questions or needs at this time. Has electric pump for outpatient use, will use [facility] for followup. Anticipates discharge after 1100 bilirubin level. Encouraged mother to call before next feeding for full assessment.

## 2019-10-02 ENCOUNTER — APPOINTMENT (OUTPATIENT)
Dept: CT IMAGING | Age: 29
End: 2019-10-02
Attending: PHYSICIAN ASSISTANT
Payer: COMMERCIAL

## 2019-10-02 ENCOUNTER — HOSPITAL ENCOUNTER (EMERGENCY)
Age: 29
Discharge: HOME | End: 2019-10-02
Attending: EMERGENCY MEDICINE
Payer: COMMERCIAL

## 2019-10-02 VITALS
HEART RATE: 92 BPM | HEIGHT: 64 IN | WEIGHT: 213 LBS | RESPIRATION RATE: 17 BRPM | TEMPERATURE: 98.2 F | SYSTOLIC BLOOD PRESSURE: 130 MMHG | DIASTOLIC BLOOD PRESSURE: 78 MMHG | OXYGEN SATURATION: 98 % | BODY MASS INDEX: 36.37 KG/M2

## 2019-10-02 DIAGNOSIS — T71.193A ASSAULT BY MANUAL STRANGULATION: Primary | ICD-10-CM

## 2019-10-02 DIAGNOSIS — S09.90XA CLOSED HEAD INJURY, INITIAL ENCOUNTER: ICD-10-CM

## 2019-10-02 LAB — HCG UR QL: NEGATIVE

## 2019-10-02 PROCEDURE — 75810000275 HC EMERGENCY DEPT VISIT NO LEVEL OF CARE

## 2019-10-02 PROCEDURE — 99284 EMERGENCY DEPT VISIT MOD MDM: CPT

## 2019-10-02 PROCEDURE — 70450 CT HEAD/BRAIN W/O DYE: CPT

## 2019-10-02 PROCEDURE — 74011250637 HC RX REV CODE- 250/637: Performed by: PHYSICIAN ASSISTANT

## 2019-10-02 PROCEDURE — 81025 URINE PREGNANCY TEST: CPT

## 2019-10-02 RX ORDER — ONDANSETRON 2 MG/ML
8 INJECTION INTRAMUSCULAR; INTRAVENOUS
Status: DISCONTINUED | OUTPATIENT
Start: 2019-10-02 | End: 2019-10-02

## 2019-10-02 RX ORDER — ONDANSETRON 4 MG/1
4 TABLET, ORALLY DISINTEGRATING ORAL
Qty: 15 TAB | Refills: 0 | Status: SHIPPED | OUTPATIENT
Start: 2019-10-02 | End: 2020-06-12 | Stop reason: CLARIF

## 2019-10-02 RX ORDER — HYDROCODONE BITARTRATE AND ACETAMINOPHEN 5; 325 MG/1; MG/1
1 TABLET ORAL
Status: DISCONTINUED | OUTPATIENT
Start: 2019-10-02 | End: 2019-10-02

## 2019-10-02 RX ORDER — HYDROCODONE BITARTRATE AND ACETAMINOPHEN 5; 325 MG/1; MG/1
1 TABLET ORAL
Qty: 15 TAB | Refills: 0 | Status: SHIPPED | OUTPATIENT
Start: 2019-10-02 | End: 2019-10-05

## 2019-10-02 RX ORDER — ONDANSETRON 4 MG/1
8 TABLET, ORALLY DISINTEGRATING ORAL
Status: COMPLETED | OUTPATIENT
Start: 2019-10-02 | End: 2019-10-02

## 2019-10-02 RX ORDER — OXYCODONE AND ACETAMINOPHEN 5; 325 MG/1; MG/1
1 TABLET ORAL
Status: COMPLETED | OUTPATIENT
Start: 2019-10-02 | End: 2019-10-02

## 2019-10-02 RX ADMIN — OXYCODONE HYDROCHLORIDE AND ACETAMINOPHEN 1 TABLET: 5; 325 TABLET ORAL at 16:29

## 2019-10-02 RX ADMIN — ONDANSETRON 8 MG: 4 TABLET, ORALLY DISINTEGRATING ORAL at 16:29

## 2019-10-02 NOTE — DISCHARGE INSTRUCTIONS
Patient Education        Strangulation Injury: Care Instructions  Your Care Instructions  Strangulation happens when something wraps so tightly around your neck that it causes harm. Injuries may include:  · Cuts in the skin around the neck. · Damage to your voice box or windpipe. · Damage to the main arteries in the neck. · Brain damage. Your doctor can provide resources for getting help if your injury was caused by domestic abuse, depression, or other mental health issues. It may not be safe to take home information like this handout if your injury was a result of domestic abuse. Some people ask a trusted friend to keep it for them. It's also important to plan ahead and to memorize the phone numbers of places you can go for help. If you are concerned about your safety, do not use your computer, smartphone, or tablet to read about domestic abuse. Follow-up care is a key part of your treatment and safety. Be sure to make and go to all appointments, and call your doctor if you are having problems. It's also a good idea to know your test results and keep a list of the medicines you take. How can you care for yourself at home? · Put ice or a cold pack on the area for 10 to 20 minutes at a time. Put a thin cloth between the ice and your skin. · Ask your doctor if you can take an over-the-counter pain medicine, such as acetaminophen (Tylenol), ibuprofen (Advil, Motrin), or naproxen (Aleve). Be safe with medicines. Read and follow all instructions on the label. · If your doctor told you how to care for any cuts on your neck, follow your doctor's instructions. If you did not get instructions, follow this general advice:  ? Wash the cut with clean water 2 times a day. Don't use hydrogen peroxide or alcohol, which can slow healing. ? You may cover it with a thin layer of petroleum jelly, such as Vaseline, and a nonstick bandage. ? Apply more petroleum jelly and replace the bandage as needed.   When should you call for help? Call 911 anytime you think you may need emergency care. For example, call if:    · You passed out (lost consciousness).     · You have a seizure.     · You have symptoms of a brain injury, such as:  ? Changes in thinking. ? Changes in movement or feeling.     · You think that you or someone you know is in danger of being abused.     · You feel you cannot stop from hurting yourself.    Call your doctor now or seek immediate medical care if:    · You have new or worse trouble breathing.     · You have new or worse trouble swallowing.     · You cough up blood.     · You have new or increased weakness or numbness in your arms.    Watch closely for changes in your health, and be sure to contact your doctor if:    · You have problems with depression or other mental health issues.     · You do not get better as expected. Where can you learn more? Go to http://rosemary-rose.info/. Enter E177 in the search box to learn more about \"Strangulation Injury: Care Instructions. \"  Current as of: June 26, 2019  Content Version: 12.2  © 4057-6473 Healthwise, Incorporated. Care instructions adapted under license by [employer] (which disclaims liability or warranty for this information). If you have questions about a medical condition or this instruction, always ask your healthcare professional. [place] [address] any warranty or liability for your use of this information. Patient Education        Learning About a Closed Head Injury  What is a closed head injury? A closed head injury happens when your head gets hit hard. The strong force of the blow causes your brain to shake in your skull. This movement can cause the brain to bruise, swell, or tear. Sometimes nerves or blood vessels also get damaged. This can cause bleeding in or around the brain. A concussion is a type of closed head injury. What are the symptoms?   If you have a mild concussion, you may have a mild headache or feel \"not quite right. \" These symptoms are common. They usually go away over a few days to 4 weeks. But sometimes after a concussion, you feel like you can't function as well as before the injury. And you have new symptoms. This is called postconcussive syndrome. You may:  · Find it harder to solve problems, think, concentrate, or remember. · Have headaches. · Have changes in your sleep patterns, such as not being able to sleep or sleeping all the time. · Have changes in your personality. · Not be interested in your usual activities. · Feel angry or anxious without a clear reason. · Lose your sense of taste or smell. · Be dizzy, lightheaded, or unsteady. It may be hard to stand or walk. How is a closed head injury treated? Any person who may have a concussion needs to see a doctor. Some people have to stay in the hospital to be watched. Others can go home safely. If you go home, follow your doctor's instructions. He or she will tell you if you need someone to watch you closely for the next 24 hours or longer. Rest is the best treatment. Get plenty of sleep at night. And try to rest during the day. · Avoid activities that are physically or mentally demanding. These include housework, exercise, and schoolwork. And don't play video games, send text messages, or use the computer. You may need to change your school or work schedule to be able to avoid these activities. · Ask your doctor when it's okay to drive, ride a bike, or operate machinery. · Take an over-the-counter pain medicine, such as acetaminophen (Tylenol), ibuprofen (Advil, Motrin), or naproxen (Aleve). Be safe with medicines. Read and follow all instructions on the label. · Check with your doctor before you use any other medicines for pain. · Do not drink alcohol or use illegal drugs. They can slow recovery. They can also increase your risk of getting a second head injury.   Follow-up care is a key part of your treatment and safety. Be sure to make and go to all appointments, and call your doctor if you are having problems. It's also a good idea to know your test results and keep a list of the medicines you take. Where can you learn more? Go to http://rosemary-rose.info/. Enter E235 in the search box to learn more about \"Learning About a Closed Head Injury. \"  Current as of: March 28, 2019  Content Version: 12.2  © 4459-9534 [employer]. Care instructions adapted under license by [employer] (which disclaims liability or warranty for this information). If you have questions about a medical condition or this instruction, always ask your healthcare professional. [address] any warranty or liability for your use of this information.
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
## 2019-10-02 NOTE — ED NOTES

## 2019-10-02 NOTE — ED NOTES
Meche ESCAMILLA returned page; she will be coming to ED to evaluate pt. Salomón QUINONES currently at bedside.

## 2019-10-02 NOTE — ED NOTES
Updated pt on plan of care and status of results. Pt states that her cousin will be her  home. RHART at bedside.
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
## 2020-06-12 ENCOUNTER — APPOINTMENT (OUTPATIENT)
Dept: ULTRASOUND IMAGING | Age: 30
End: 2020-06-12
Attending: EMERGENCY MEDICINE
Payer: MEDICAID

## 2020-06-12 ENCOUNTER — HOSPITAL ENCOUNTER (EMERGENCY)
Age: 30
Discharge: HOME | End: 2020-06-12
Attending: EMERGENCY MEDICINE
Payer: MEDICAID

## 2020-06-12 VITALS
BODY MASS INDEX: 33.31 KG/M2 | SYSTOLIC BLOOD PRESSURE: 123 MMHG | OXYGEN SATURATION: 98 % | HEIGHT: 68 IN | WEIGHT: 219.8 LBS | DIASTOLIC BLOOD PRESSURE: 81 MMHG | HEART RATE: 80 BPM | RESPIRATION RATE: 18 BRPM | TEMPERATURE: 99.3 F

## 2020-06-12 DIAGNOSIS — Z32.01 PREGNANCY TEST PERFORMED, PREGNANCY CONFIRMED: Primary | ICD-10-CM

## 2020-06-12 DIAGNOSIS — R10.32 ABDOMINAL PAIN, LLQ (LEFT LOWER QUADRANT): ICD-10-CM

## 2020-06-12 DIAGNOSIS — N30.00 ACUTE CYSTITIS WITHOUT HEMATURIA: ICD-10-CM

## 2020-06-12 LAB
ALBUMIN SERPL-MCNC: 3.8 G/DL (ref 3.5–5)
ALBUMIN/GLOB SERPL: 1 {RATIO} (ref 1.1–2.2)
ALP SERPL-CCNC: 68 U/L (ref 45–117)
ALT SERPL-CCNC: 20 U/L (ref 12–78)
ANION GAP SERPL CALC-SCNC: 8 MMOL/L (ref 5–15)
APPEARANCE UR: ABNORMAL
AST SERPL-CCNC: 13 U/L (ref 15–37)
BACTERIA URNS QL MICRO: ABNORMAL /HPF
BASOPHILS # BLD: 0 K/UL (ref 0–0.1)
BASOPHILS NFR BLD: 0 % (ref 0–1)
BILIRUB SERPL-MCNC: 0.9 MG/DL (ref 0.2–1)
BILIRUB UR QL CFM: NEGATIVE
BUN SERPL-MCNC: 7 MG/DL (ref 6–20)
BUN/CREAT SERPL: 10 (ref 12–20)
CALCIUM SERPL-MCNC: 8.9 MG/DL (ref 8.5–10.1)
CHLORIDE SERPL-SCNC: 107 MMOL/L (ref 97–108)
CO2 SERPL-SCNC: 20 MMOL/L (ref 21–32)
COLOR UR: ABNORMAL
CREAT SERPL-MCNC: 0.69 MG/DL (ref 0.55–1.02)
DIFFERENTIAL METHOD BLD: ABNORMAL
EOSINOPHIL # BLD: 0 K/UL (ref 0–0.4)
EOSINOPHIL NFR BLD: 0 % (ref 0–7)
EPITH CASTS URNS QL MICRO: ABNORMAL /LPF
ERYTHROCYTE [DISTWIDTH] IN BLOOD BY AUTOMATED COUNT: 12.7 % (ref 11.5–14.5)
GLOBULIN SER CALC-MCNC: 3.9 G/DL (ref 2–4)
GLUCOSE SERPL-MCNC: 92 MG/DL (ref 65–100)
GLUCOSE UR STRIP.AUTO-MCNC: NEGATIVE MG/DL
HCG SERPL-ACNC: 154 MIU/ML (ref 0–6)
HCG UR QL: POSITIVE
HCT VFR BLD AUTO: 39.8 % (ref 35–47)
HGB BLD-MCNC: 13.2 G/DL (ref 11.5–16)
HGB UR QL STRIP: ABNORMAL
HYALINE CASTS URNS QL MICRO: ABNORMAL /LPF (ref 0–5)
IMM GRANULOCYTES # BLD AUTO: 0 K/UL (ref 0–0.04)
IMM GRANULOCYTES NFR BLD AUTO: 0 % (ref 0–0.5)
KETONES UR QL STRIP.AUTO: 80 MG/DL
LEUKOCYTE ESTERASE UR QL STRIP.AUTO: ABNORMAL
LIPASE SERPL-CCNC: 74 U/L (ref 73–393)
LYMPHOCYTES # BLD: 1.7 K/UL (ref 0.8–3.5)
LYMPHOCYTES NFR BLD: 17 % (ref 12–49)
MCH RBC QN AUTO: 27.6 PG (ref 26–34)
MCHC RBC AUTO-ENTMCNC: 33.2 G/DL (ref 30–36.5)
MCV RBC AUTO: 83.3 FL (ref 80–99)
MONOCYTES # BLD: 0.6 K/UL (ref 0–1)
MONOCYTES NFR BLD: 6 % (ref 5–13)
MUCOUS THREADS URNS QL MICRO: ABNORMAL /LPF
NEUTS SEG # BLD: 7.6 K/UL (ref 1.8–8)
NEUTS SEG NFR BLD: 77 % (ref 32–75)
NITRITE UR QL STRIP.AUTO: NEGATIVE
NRBC # BLD: 0 K/UL (ref 0–0.01)
NRBC BLD-RTO: 0 PER 100 WBC
PH UR STRIP: 5.5 [PH] (ref 5–8)
PLATELET # BLD AUTO: 293 K/UL (ref 150–400)
PMV BLD AUTO: 9.6 FL (ref 8.9–12.9)
POTASSIUM SERPL-SCNC: 3.5 MMOL/L (ref 3.5–5.1)
PROT SERPL-MCNC: 7.7 G/DL (ref 6.4–8.2)
PROT UR STRIP-MCNC: 100 MG/DL
RBC # BLD AUTO: 4.78 M/UL (ref 3.8–5.2)
RBC #/AREA URNS HPF: ABNORMAL /HPF (ref 0–5)
SODIUM SERPL-SCNC: 135 MMOL/L (ref 136–145)
SP GR UR REFRACTOMETRY: 1.03 (ref 1–1.03)
UA: UC IF INDICATED,UAUC: ABNORMAL
UROBILINOGEN UR QL STRIP.AUTO: 1 EU/DL (ref 0.2–1)
WBC # BLD AUTO: 10.1 K/UL (ref 3.6–11)
WBC URNS QL MICRO: ABNORMAL /HPF (ref 0–4)

## 2020-06-12 PROCEDURE — 76817 TRANSVAGINAL US OBSTETRIC: CPT

## 2020-06-12 PROCEDURE — 36415 COLL VENOUS BLD VENIPUNCTURE: CPT

## 2020-06-12 PROCEDURE — 74011250636 HC RX REV CODE- 250/636: Performed by: EMERGENCY MEDICINE

## 2020-06-12 PROCEDURE — 83690 ASSAY OF LIPASE: CPT

## 2020-06-12 PROCEDURE — 96365 THER/PROPH/DIAG IV INF INIT: CPT

## 2020-06-12 PROCEDURE — 85025 COMPLETE CBC W/AUTO DIFF WBC: CPT

## 2020-06-12 PROCEDURE — 87086 URINE CULTURE/COLONY COUNT: CPT

## 2020-06-12 PROCEDURE — 74011000258 HC RX REV CODE- 258: Performed by: EMERGENCY MEDICINE

## 2020-06-12 PROCEDURE — 99284 EMERGENCY DEPT VISIT MOD MDM: CPT

## 2020-06-12 PROCEDURE — 76770 US EXAM ABDO BACK WALL COMP: CPT

## 2020-06-12 PROCEDURE — 84702 CHORIONIC GONADOTROPIN TEST: CPT

## 2020-06-12 PROCEDURE — 80053 COMPREHEN METABOLIC PANEL: CPT

## 2020-06-12 PROCEDURE — 81025 URINE PREGNANCY TEST: CPT

## 2020-06-12 PROCEDURE — 81001 URINALYSIS AUTO W/SCOPE: CPT

## 2020-06-12 RX ORDER — ONDANSETRON 4 MG/1
4 TABLET, ORALLY DISINTEGRATING ORAL
Qty: 20 TAB | Refills: 0 | Status: SHIPPED | OUTPATIENT
Start: 2020-06-12 | End: 2022-01-05

## 2020-06-12 RX ORDER — ONDANSETRON 4 MG/1
4 TABLET, ORALLY DISINTEGRATING ORAL
Qty: 20 TAB | Refills: 0 | Status: SHIPPED | OUTPATIENT
Start: 2020-06-12 | End: 2020-06-12 | Stop reason: SDUPTHER

## 2020-06-12 RX ORDER — CEPHALEXIN 500 MG/1
500 CAPSULE ORAL 2 TIMES DAILY
Qty: 14 CAP | Refills: 0 | Status: SHIPPED | OUTPATIENT
Start: 2020-06-12 | End: 2020-06-19

## 2020-06-12 RX ORDER — CEPHALEXIN 500 MG/1
500 CAPSULE ORAL 2 TIMES DAILY
Qty: 14 CAP | Refills: 0 | Status: SHIPPED | OUTPATIENT
Start: 2020-06-12 | End: 2020-06-12 | Stop reason: SDUPTHER

## 2020-06-12 RX ADMIN — CEFTRIAXONE SODIUM 1 G: 1 INJECTION, POWDER, FOR SOLUTION INTRAMUSCULAR; INTRAVENOUS at 18:34

## 2020-06-12 NOTE — ED NOTES
Pt appears to be having an anxiety attack, after being informed of her positive urine pregnancy test. Tachypnea and tachycardia noted. Therapeutic presence provided, and pt now appears calm. Suggestion made for pt to call a family member to sit with her.

## 2020-06-12 NOTE — ED NOTES
Bedside and Verbal shift change report given to Sue Obregon RN (oncoming nurse) by Linn Randall RN (offgoing nurse). Report included the following information SBAR, ED Summary, MAR and Recent Results.

## 2020-06-12 NOTE — ED NOTES
Pt discharged home with written and verbal instructions given by Dr. Yovani Escobar and patient ambulated out of ED without difficulty.

## 2020-06-12 NOTE — DISCHARGE INSTRUCTIONS
You were evaluated in the emergency department for left pelvic pain. Your examination was reassuring as was your work-up including blood work, urinalysis, and ultrasounds. You are very early in a new pregnancy, your B-HCG level is 150. You also have a urinary tract infection. It will be important for you to follow-up with Dr. Enrique Garza as soon as possible this next week to have your B-HCG rechecked. If you develop worsening symptoms such as worsening abdominal pain, passing out episodes, or fevers, please return to the emergency department immediately.

## 2020-06-12 NOTE — ED PROVIDER NOTES
EMERGENCY DEPARTMENT HISTORY AND PHYSICAL EXAM      Date: 6/12/2020  Patient Name: Cait Zendejas    History of Presenting Illness     Chief Complaint   Patient presents with    Abdominal Pain     The patient presents to the ED with complaints of lower quadrant abdominal pain that radiates to the left flank. The patient denies any pain or difficulty with urination. History Provided By: Patient    HPI: Cait Zendejas, 34 y.o. female without significant medical history presents to the ED with cc of left-sided abdominal pain, left flank pain. Symptoms onset this morning waking patient out of sleep and have been persistent since onset. She does endorse some nausea and emesis but this has improved. Denies fevers. Symptoms unfamiliar to the patient. She has irregular menstrual periods at baseline and believes she is coming up on her menstrual period. She does not endorse any significant urination symptoms. Denies chest pain, shortness of breath, change in bowel habits. Pain is described as sharp and located throughout entire left lower abdomen and left side abdomen    There are no other complaints, changes, or physical findings at this time. PCP: Edita, MD Latanya    No current facility-administered medications on file prior to encounter. No current outpatient medications on file prior to encounter. Past History     Past Medical History:  Past Medical History:   Diagnosis Date    Anemia     Costochondritis        Past Surgical History:  Past Surgical History:   Procedure Laterality Date    HX ORTHOPAEDIC      Right fourth finger, \"Digit widgit\"       Family History:  Family History   Family history unknown: Yes       Social History:  Social History     Tobacco Use    Smoking status: Former Smoker    Smokeless tobacco: Never Used    Tobacco comment: \"only when I drink\"   Substance Use Topics    Alcohol use: No     Frequency: Never     Comment: Occasionally    Drug use:  No Allergies: Allergies   Allergen Reactions    Motrin [Ibuprofen] Hives         Review of Systems   Review of Systems   Constitutional: Negative for chills and fever. HENT: Negative. Eyes: Negative for visual disturbance. Respiratory: Negative for cough and shortness of breath. Cardiovascular: Negative for chest pain and leg swelling. Gastrointestinal: Positive for abdominal pain, nausea and vomiting. Genitourinary: Positive for flank pain. Negative for dysuria and vaginal bleeding. Musculoskeletal: Positive for back pain. Negative for gait problem. Skin: Negative for color change and rash. Neurological: Negative for dizziness, weakness, light-headedness and headaches. Hematological: Does not bruise/bleed easily. All other systems reviewed and are negative. Physical Exam   Physical Exam  Vitals signs and nursing note reviewed. Constitutional:       General: She is not in acute distress. Appearance: Normal appearance. She is not ill-appearing, toxic-appearing or diaphoretic. HENT:      Head: Normocephalic and atraumatic. Mouth/Throat:      Mouth: Mucous membranes are moist.   Neck:      Musculoskeletal: Normal range of motion. Cardiovascular:      Rate and Rhythm: Normal rate and regular rhythm. Heart sounds: Normal heart sounds. No murmur. Pulmonary:      Effort: Pulmonary effort is normal. No respiratory distress. Breath sounds: Normal breath sounds. No wheezing. Abdominal:      Palpations: Abdomen is soft. Tenderness: There is abdominal tenderness in the periumbilical area, left upper quadrant and left lower quadrant. There is left CVA tenderness. There is no right CVA tenderness, guarding or rebound. Musculoskeletal:      Right lower leg: No edema. Left lower leg: No edema. Skin:     General: Skin is warm and dry. Findings: No erythema or rash. Neurological:      General: No focal deficit present.       Mental Status: She is alert and oriented to person, place, and time. Diagnostic Study Results     Labs -     Recent Results (from the past 12 hour(s))   URINALYSIS W/ REFLEX CULTURE    Collection Time: 06/12/20  2:53 PM   Result Value Ref Range    Color DARK YELLOW      Appearance TURBID (A) CLEAR      Specific gravity 1.029 1.003 - 1.030      pH (UA) 5.5 5.0 - 8.0      Protein 100 (A) NEG mg/dL    Glucose Negative NEG mg/dL    Ketone 80 (A) NEG mg/dL    Blood LARGE (A) NEG      Urobilinogen 1.0 0.2 - 1.0 EU/dL    Nitrites Negative NEG      Leukocyte Esterase MODERATE (A) NEG      WBC  0 - 4 /hpf    RBC 10-20 0 - 5 /hpf    Epithelial cells MANY (A) FEW /lpf    Bacteria 1+ (A) NEG /hpf    UA:UC IF INDICATED URINE CULTURE ORDERED (A) CNI      Mucus 1+ (A) NEG /lpf    Hyaline cast 2-5 0 - 5 /lpf   CBC WITH AUTOMATED DIFF    Collection Time: 06/12/20  2:53 PM   Result Value Ref Range    WBC 10.1 3.6 - 11.0 K/uL    RBC 4.78 3.80 - 5.20 M/uL    HGB 13.2 11.5 - 16.0 g/dL    HCT 39.8 35.0 - 47.0 %    MCV 83.3 80.0 - 99.0 FL    MCH 27.6 26.0 - 34.0 PG    MCHC 33.2 30.0 - 36.5 g/dL    RDW 12.7 11.5 - 14.5 %    PLATELET 013 823 - 840 K/uL    MPV 9.6 8.9 - 12.9 FL    NRBC 0.0 0  WBC    ABSOLUTE NRBC 0.00 0.00 - 0.01 K/uL    NEUTROPHILS 77 (H) 32 - 75 %    LYMPHOCYTES 17 12 - 49 %    MONOCYTES 6 5 - 13 %    EOSINOPHILS 0 0 - 7 %    BASOPHILS 0 0 - 1 %    IMMATURE GRANULOCYTES 0 0.0 - 0.5 %    ABS. NEUTROPHILS 7.6 1.8 - 8.0 K/UL    ABS. LYMPHOCYTES 1.7 0.8 - 3.5 K/UL    ABS. MONOCYTES 0.6 0.0 - 1.0 K/UL    ABS. EOSINOPHILS 0.0 0.0 - 0.4 K/UL    ABS. BASOPHILS 0.0 0.0 - 0.1 K/UL    ABS. IMM.  GRANS. 0.0 0.00 - 0.04 K/UL    DF AUTOMATED     METABOLIC PANEL, COMPREHENSIVE    Collection Time: 06/12/20  2:53 PM   Result Value Ref Range    Sodium 135 (L) 136 - 145 mmol/L    Potassium 3.5 3.5 - 5.1 mmol/L    Chloride 107 97 - 108 mmol/L    CO2 20 (L) 21 - 32 mmol/L    Anion gap 8 5 - 15 mmol/L    Glucose 92 65 - 100 mg/dL    BUN 7 6 - 20 MG/DL Creatinine 0.69 0.55 - 1.02 MG/DL    BUN/Creatinine ratio 10 (L) 12 - 20      GFR est AA >60 >60 ml/min/1.73m2    GFR est non-AA >60 >60 ml/min/1.73m2    Calcium 8.9 8.5 - 10.1 MG/DL    Bilirubin, total 0.9 0.2 - 1.0 MG/DL    ALT (SGPT) 20 12 - 78 U/L    AST (SGOT) 13 (L) 15 - 37 U/L    Alk. phosphatase 68 45 - 117 U/L    Protein, total 7.7 6.4 - 8.2 g/dL    Albumin 3.8 3.5 - 5.0 g/dL    Globulin 3.9 2.0 - 4.0 g/dL    A-G Ratio 1.0 (L) 1.1 - 2.2     LIPASE    Collection Time: 06/12/20  2:53 PM   Result Value Ref Range    Lipase 74 73 - 393 U/L   BILIRUBIN, CONFIRM    Collection Time: 06/12/20  2:53 PM   Result Value Ref Range    Bilirubin UA, confirm Negative NEG     BETA HCG, QT    Collection Time: 06/12/20  2:53 PM   Result Value Ref Range    Beta HCG,  (H) 0 - 6 MIU/ML   HCG URINE, QL. - POC    Collection Time: 06/12/20  3:05 PM   Result Value Ref Range    Pregnancy test,urine (POC) Positive (A) NEG         Radiologic Studies -   US UTS TRANSVAGINAL OB   Final Result   IMPRESSION: 7 mm irregular gestational sac within the endometrial canal, with no   visualized fetal pole or yolk sac; clinical follow-up is recommended. No   evidence of adnexal mass. Small pelvic free fluid, likely physiologic. US RETROPERITONEUM COMP   Final Result   IMPRESSION:    Normal renal ultrasound. CT Results  (Last 48 hours)    None        CXR Results  (Last 48 hours)    None          Medical Decision Making   I am the first provider for this patient. I reviewed the vital signs, available nursing notes, past medical history, past surgical history, family history and social history. Vital Signs-Reviewed the patient's vital signs.   Patient Vitals for the past 12 hrs:   Temp Pulse Resp BP SpO2   06/12/20 1900 -- -- -- 123/81 98 %   06/12/20 1851 -- -- -- 121/75 99 %   06/12/20 1700 -- -- -- 133/88 100 %   06/12/20 1600 -- 80 -- 123/76 100 %   06/12/20 1417 99.3 °F (37.4 °C) 80 18 (!) 145/94 100 %       Records Reviewed: Nursing Notes    Provider Notes (Medical Decision Making):   32-year-old female here with left-sided abdominal pain with associated left flank pain, nausea, vomiting. She is found to have new diagnosis of pregnancy with beta-hCG only 150. She is afebrile vital signs stable throughout entire ED stay. Active emesis in the ED. She is noted to have significant urinary tract infection on urinalysis with  WBC, 1+ bacteria, moderate leukocyte Estrace. She does have some hematuria however ultrasound renals does not show any hydronephrosis making nephrolithiasis much less likely. CT unable to be obtained given patient's pregnancy. I did have concern for ectopic pregnancy despite low beta-hCG and ultrasound obtained which demonstrates 7 mm irregular gestational sac within the endometrial canal without evidence of fetal pole or yolk sac, no sign of ectopic pregnancy on this study. Small pelvic free fluid noted. No sign of torsion. I discussed with OB hospitalist who agrees the patient is stable for discharge home with outpatient follow-up with OB/GYN to repeat beta-hCG next week. Patient given strict return ED precautions including worsening pelvic pain, syncope, vaginal bleeding. She will be treated with Rocephin in the ED and outpatient antibiotics for presumed UTI versus pyelonephritis. Patient feels comfortable with discharge home and all questions answered. ED Course:   Initial assessment performed. The patients presenting problems have been discussed, and they are in agreement with the care plan formulated and outlined with them. I have encouraged them to ask questions as they arise throughout their visit.     ED Course as of Jun 13 0208 Fri Jun 12, 2020   4158 [facility] that patient does not need hospitalization for presumed pyelonephritis is early on in pregnancy, patient needs close beta-hCG follow-up with OB/GYN on Monday per [facility] hospitalist    [AK]      ED Course User Index  [AK] Jadiel Moss MD       Discharge Note:  The patient has been re-evaluated and is ready for discharge. Reviewed available results with patient. Counseled patient on diagnosis and care plan. Patient has expressed understanding, and all questions have been answered. Patient agrees with plan and agrees to follow up as recommended, or to return to the ED if their symptoms worsen. Discharge instructions have been provided and explained to the patient, along with reasons to return to the ED. Disposition:  Discharge home with OB/GYN follow-up    DISCHARGE PLAN:  1. Discharge Medication List as of 6/12/2020  7:38 PM      START taking these medications    Details   cephALEXin (Keflex) 500 mg capsule Take 1 Cap by mouth two (2) times a day for 7 days. , Print, Disp-14 Cap, R-0      ondansetron (ZOFRAN ODT) 4 mg disintegrating tablet Take 1 Tab by mouth every eight (8) hours as needed for Nausea or Vomiting., Print, Disp-20 Tab, R-0           2. Follow-up Information     Follow up With Specialties Details Why Sharda Madera MD Obstetrics & Gynecology, Gynecology, Obstetrics Schedule an appointment as soon as possible for a visit in 3 days for repeat [facility] testing [address] [place] [address]  [phone]          3. Return to ED if worse     Diagnosis     Clinical Impression:   1. Pregnancy test performed, pregnancy confirmed    2. Acute cystitis without hematuria    3. Abdominal pain, LLQ (left lower quadrant)        Attestations:    Luane Mcburney, MD    Please note that this dictation was completed with [employer], the computer voice recognition software. Quite often unanticipated grammatical, syntax, homophones, and other interpretive errors are inadvertently transcribed by the computer software. Please disregard these errors. Please excuse any errors that have escaped final proofreading. Thank you.

## 2020-06-12 NOTE — ED NOTES
Bedside and Verbal shift change report given to Corbin Mckeon RN (oncoming nurse) by this RN (offgoing nurse). Report included the following information SBAR.

## 2020-06-14 ENCOUNTER — APPOINTMENT (OUTPATIENT)
Dept: ULTRASOUND IMAGING | Age: 30
End: 2020-06-14
Attending: EMERGENCY MEDICINE
Payer: MEDICAID

## 2020-06-14 ENCOUNTER — HOSPITAL ENCOUNTER (EMERGENCY)
Age: 30
Discharge: HOME | End: 2020-06-15
Attending: EMERGENCY MEDICINE
Payer: MEDICAID

## 2020-06-14 DIAGNOSIS — R55 NEAR SYNCOPE: ICD-10-CM

## 2020-06-14 DIAGNOSIS — N93.9 VAGINAL BLEEDING: ICD-10-CM

## 2020-06-14 DIAGNOSIS — O03.9 MISCARRIAGE: Primary | ICD-10-CM

## 2020-06-14 DIAGNOSIS — I95.1 ORTHOSTASIS: ICD-10-CM

## 2020-06-14 LAB
ABO + RH BLD: NORMAL
ANION GAP SERPL CALC-SCNC: 8 MMOL/L (ref 5–15)
APPEARANCE UR: ABNORMAL
BACTERIA SPEC CULT: NORMAL
BACTERIA URNS QL MICRO: NEGATIVE /HPF
BASOPHILS # BLD: 0 K/UL (ref 0–0.1)
BASOPHILS NFR BLD: 1 % (ref 0–1)
BILIRUB UR QL: NEGATIVE
BLOOD GROUP ANTIBODIES SERPL: NORMAL
BUN SERPL-MCNC: 12 MG/DL (ref 6–20)
BUN/CREAT SERPL: 14 (ref 12–20)
CALCIUM SERPL-MCNC: 8.6 MG/DL (ref 8.5–10.1)
CC UR VC: NORMAL
CHLORIDE SERPL-SCNC: 105 MMOL/L (ref 97–108)
CO2 SERPL-SCNC: 25 MMOL/L (ref 21–32)
COLOR UR: ABNORMAL
CREAT SERPL-MCNC: 0.88 MG/DL (ref 0.55–1.02)
DIFFERENTIAL METHOD BLD: ABNORMAL
EOSINOPHIL # BLD: 0.1 K/UL (ref 0–0.4)
EOSINOPHIL NFR BLD: 2 % (ref 0–7)
EPITH CASTS URNS QL MICRO: ABNORMAL /LPF
ERYTHROCYTE [DISTWIDTH] IN BLOOD BY AUTOMATED COUNT: 13 % (ref 11.5–14.5)
GLUCOSE SERPL-MCNC: 94 MG/DL (ref 65–100)
GLUCOSE UR STRIP.AUTO-MCNC: NEGATIVE MG/DL
HCG SERPL-ACNC: 83 MIU/ML (ref 0–6)
HCT VFR BLD AUTO: 39.4 % (ref 35–47)
HGB BLD-MCNC: 13 G/DL (ref 11.5–16)
HGB UR QL STRIP: ABNORMAL
IMM GRANULOCYTES # BLD AUTO: 0 K/UL (ref 0–0.04)
IMM GRANULOCYTES NFR BLD AUTO: 1 % (ref 0–0.5)
KETONES UR QL STRIP.AUTO: NEGATIVE MG/DL
LEUKOCYTE ESTERASE UR QL STRIP.AUTO: NEGATIVE
LYMPHOCYTES # BLD: 1.8 K/UL (ref 0.8–3.5)
LYMPHOCYTES NFR BLD: 23 % (ref 12–49)
MCH RBC QN AUTO: 27.7 PG (ref 26–34)
MCHC RBC AUTO-ENTMCNC: 33 G/DL (ref 30–36.5)
MCV RBC AUTO: 83.8 FL (ref 80–99)
MONOCYTES # BLD: 0.9 K/UL (ref 0–1)
MONOCYTES NFR BLD: 12 % (ref 5–13)
NEUTS SEG # BLD: 4.9 K/UL (ref 1.8–8)
NEUTS SEG NFR BLD: 61 % (ref 32–75)
NITRITE UR QL STRIP.AUTO: NEGATIVE
NRBC # BLD: 0 K/UL (ref 0–0.01)
NRBC BLD-RTO: 0 PER 100 WBC
PH UR STRIP: 6 [PH] (ref 5–8)
PLATELET # BLD AUTO: 263 K/UL (ref 150–400)
PMV BLD AUTO: 9.9 FL (ref 8.9–12.9)
POTASSIUM SERPL-SCNC: 3.7 MMOL/L (ref 3.5–5.1)
PROT UR STRIP-MCNC: ABNORMAL MG/DL
RBC # BLD AUTO: 4.7 M/UL (ref 3.8–5.2)
RBC #/AREA URNS HPF: ABNORMAL /HPF (ref 0–5)
SERVICE CMNT-IMP: NORMAL
SODIUM SERPL-SCNC: 138 MMOL/L (ref 136–145)
SP GR UR REFRACTOMETRY: >1.03 (ref 1–1.03)
SPECIMEN EXP DATE BLD: NORMAL
UA: UC IF INDICATED,UAUC: ABNORMAL
UROBILINOGEN UR QL STRIP.AUTO: 1 EU/DL (ref 0.2–1)
WBC # BLD AUTO: 7.8 K/UL (ref 3.6–11)
WBC URNS QL MICRO: ABNORMAL /HPF (ref 0–4)

## 2020-06-14 PROCEDURE — 74011250636 HC RX REV CODE- 250/636: Performed by: EMERGENCY MEDICINE

## 2020-06-14 PROCEDURE — 96360 HYDRATION IV INFUSION INIT: CPT

## 2020-06-14 PROCEDURE — 84702 CHORIONIC GONADOTROPIN TEST: CPT

## 2020-06-14 PROCEDURE — 86900 BLOOD TYPING SEROLOGIC ABO: CPT

## 2020-06-14 PROCEDURE — 81001 URINALYSIS AUTO W/SCOPE: CPT

## 2020-06-14 PROCEDURE — 76817 TRANSVAGINAL US OBSTETRIC: CPT

## 2020-06-14 PROCEDURE — 99285 EMERGENCY DEPT VISIT HI MDM: CPT

## 2020-06-14 PROCEDURE — 74011250637 HC RX REV CODE- 250/637: Performed by: EMERGENCY MEDICINE

## 2020-06-14 PROCEDURE — 80048 BASIC METABOLIC PNL TOTAL CA: CPT

## 2020-06-14 PROCEDURE — 85018 HEMOGLOBIN: CPT

## 2020-06-14 PROCEDURE — 36415 COLL VENOUS BLD VENIPUNCTURE: CPT

## 2020-06-14 PROCEDURE — 85025 COMPLETE CBC W/AUTO DIFF WBC: CPT

## 2020-06-14 RX ORDER — HYDROXYZINE 25 MG/1
50 TABLET, FILM COATED ORAL
Status: COMPLETED | OUTPATIENT
Start: 2020-06-14 | End: 2020-06-15

## 2020-06-14 RX ORDER — ACETAMINOPHEN 500 MG
1000 TABLET ORAL
Status: COMPLETED | OUTPATIENT
Start: 2020-06-14 | End: 2020-06-14

## 2020-06-14 RX ADMIN — ACETAMINOPHEN 1000 MG: 500 TABLET ORAL at 22:20

## 2020-06-14 RX ADMIN — SODIUM CHLORIDE 1000 ML: 900 INJECTION, SOLUTION INTRAVENOUS at 22:20

## 2020-06-14 NOTE — LETTER
[address] 
[facility] EMERGENCY DEPT 
[address] [place] [address] [place] [id number] 
[phone] Work/School Note Date: 6/14/2020 To Whom It May concern: 
 
Hector Carpio was seen and treated today in the emergency room by the following provider(s): 
Attending Provider: William Strong MD 
Physician: Jose Antoine MD. Hector Carpio may return to work on 6/17/20. Sincerely, Allie Arreguin MD

## 2020-06-15 VITALS
BODY MASS INDEX: 33.58 KG/M2 | SYSTOLIC BLOOD PRESSURE: 120 MMHG | HEART RATE: 88 BPM | HEIGHT: 68 IN | TEMPERATURE: 98.7 F | RESPIRATION RATE: 20 BRPM | DIASTOLIC BLOOD PRESSURE: 60 MMHG | WEIGHT: 221.56 LBS | OXYGEN SATURATION: 100 %

## 2020-06-15 LAB — HGB BLD-MCNC: 11.7 G/DL (ref 11.5–16)

## 2020-06-15 PROCEDURE — 74011250637 HC RX REV CODE- 250/637: Performed by: EMERGENCY MEDICINE

## 2020-06-15 RX ADMIN — HYDROXYZINE HYDROCHLORIDE 50 MG: 25 TABLET, FILM COATED ORAL at 00:16

## 2020-06-15 NOTE — DISCHARGE INSTRUCTIONS
You were evaluated in the emergency department for vaginal bleeding in setting of pregnancy. Your examination was reassuring as was your work-up including blood work and ultrasound. You are likely having a miscarriage as your B-HCG is 80. It will be important for you to follow-up with Dr. Lety Gu in 1-3 days. If you develop worsening symptoms such as passing out episodes, worsening pain, or worsening bleeding, please return to the emergency department immediately. Patient Education        Abnormal Uterine Bleeding: Care Instructions  Your Care Instructions     Abnormal uterine bleeding is irregular bleeding from the uterus that is longer or heavier than usual or does not occur at your regular time. Sometimes it is caused by changes in hormone levels. It can also be caused by growths in the uterus, such as fibroids or polyps. Sometimes a cause cannot be found. You may have heavy bleeding when you are not expecting your period. Your doctor may suggest a pregnancy test, if you think you are pregnant. Follow-up care is a key part of your treatment and safety. Be sure to make and go to all appointments, and call your doctor if you are having problems. It's also a good idea to know your test results and keep a list of the medicines you take. How can you care for yourself at home? · Be safe with medicines. Take pain medicines exactly as directed. ? If the doctor gave you a prescription medicine for pain, take it as prescribed. ? If you are not taking a prescription pain medicine, ask your doctor if you can take an over-the-counter medicine. · You may be low in iron because of blood loss. Eat a balanced diet that is high in iron and vitamin C. Foods rich in iron include red meat, shellfish, eggs, beans, and leafy green vegetables. Talk to your doctor about whether you need to take iron pills or a multivitamin. When should you call for help? [id number] anytime you think you may need emergency care.  For example, call if:  · You passed out (lost consciousness). Call your doctor now or seek immediate medical care if:  · You have new or worse belly or pelvic pain. · You have severe vaginal bleeding. · You feel dizzy or lightheaded, or you feel like you may faint. Watch closely for changes in your health, and be sure to contact your doctor if:  · You think you may be pregnant. · Your bleeding gets worse. · You do not get better as expected. Where can you learn more? Go to http://rosemary-rose.info/  Enter I327 in the search box to learn more about \"Abnormal Uterine Bleeding: Care Instructions. \"  Current as of: November 8, 2019               Content Version: 12.5  © 5761-7153 [employer]. Care instructions adapted under license by [employer] (which disclaims liability or warranty for this information). If you have questions about a medical condition or this instruction, always ask your healthcare professional. [place] [address] any warranty or liability for your use of this information.

## 2020-06-15 NOTE — ED NOTES
Vilma Nuno MD reviewed discharge instructions with the patient. The patient verbalized understanding. All questions and concerns were addressed. The patient is discharged ambulatory with instructions and prescriptions in hand. Pt is alert and oriented x 4. Respirations are clear and unlabored.

## 2020-06-15 NOTE — ED PROVIDER NOTES
EMERGENCY DEPARTMENT HISTORY AND PHYSICAL EXAM      Date: 6/14/2020  Patient Name: Emerita Israel    History of Presenting Illness     Chief Complaint   Patient presents with    Vaginal Bleeding     Pt CC of vaginal bleeding (1-2pads/hr) starting yesterday and sharp pains in her lower abd starting today. Pt states pain started today and bleeding got worse. Pt is about a month pregnant but unsure exactly. Pt OB is Dr. Xena Ott.  Pregnancy Problem       History Provided By: Patient    HPI: Emerita Israel, 34 y.o. female presents to the ED with cc of vaginal bleeding, lower abdominal pain. Patient was seen in the ED 2 days ago on 6/12/2020 by myself, she was diagnosed with pregnancy with beta-hCG 150. Ultrasound was obtained which showed gestational sac but no yolk sac or fetal pole intrauterine, no sign of ectopic pregnancy. She was discharged home 2 days ago to follow-up with her OB/GYN. She states that over the past 24 hours she has had significant vaginal bleeding going through 1-2 pads per hour. She complains of persistent pain located to the left side abdomen but it is now located primarily to the suprapubic region and radiating around to her back. She does feel lightheaded especially when standing. She denies any chest pain, shortness of breath, nausea, vomiting, fevers. There are no other complaints, changes, or physical findings at this time. PCP: None    No current facility-administered medications on file prior to encounter. Current Outpatient Medications on File Prior to Encounter   Medication Sig Dispense Refill    cephALEXin (Keflex) 500 mg capsule Take 1 Cap by mouth two (2) times a day for 7 days. 14 Cap 0    ondansetron (ZOFRAN ODT) 4 mg disintegrating tablet Take 1 Tab by mouth every eight (8) hours as needed for Nausea or Vomiting.  20 Tab 0       Past History     Past Medical History:  Past Medical History:   Diagnosis Date    Anemia     Costochondritis        Past Surgical History:  Past Surgical History:   Procedure Laterality Date    HX ORTHOPAEDIC      Right fourth finger, \"Digit widgit\"       Family History:  Family History   Family history unknown: Yes       Social History:  Social History     Tobacco Use    Smoking status: Former Smoker    Smokeless tobacco: Never Used    Tobacco comment: \"only when I drink\"   Substance Use Topics    Alcohol use: No     Frequency: Never     Comment: Occasionally    Drug use: No       Allergies: Allergies   Allergen Reactions    Motrin [Ibuprofen] Hives         Review of Systems   Review of Systems   Constitutional: Negative for chills and fever. HENT: Negative. Eyes: Negative for visual disturbance. Respiratory: Negative for cough and shortness of breath. Cardiovascular: Negative for chest pain and leg swelling. Gastrointestinal: Positive for abdominal pain. Negative for nausea and vomiting. Genitourinary: Positive for pelvic pain and vaginal bleeding. Musculoskeletal: Positive for back pain. Negative for gait problem. Skin: Negative for color change and rash. Neurological: Positive for light-headedness. Negative for dizziness, weakness and headaches. Hematological: Does not bruise/bleed easily. All other systems reviewed and are negative. Physical Exam   Physical Exam  Vitals signs and nursing note reviewed. Constitutional:       General: She is not in acute distress. Appearance: Normal appearance. She is not ill-appearing, toxic-appearing or diaphoretic. HENT:      Head: Normocephalic and atraumatic. Cardiovascular:      Rate and Rhythm: Normal rate and regular rhythm. Heart sounds: Normal heart sounds. No murmur. Pulmonary:      Effort: Pulmonary effort is normal. No respiratory distress. Breath sounds: Normal breath sounds. No wheezing. Abdominal:      Palpations: Abdomen is soft. Tenderness: There is abdominal tenderness.  There is no right CVA tenderness, left CVA tenderness, guarding or rebound. Genitourinary:     Comments: Female RN chaperone present. Normal external exam.  There is minimal blood in vaginal vault, no clots, no products of conception noted on this pelvic exam.  Cervical os is closed. No adnexal tenderness or mass, no tenderness with manipulation of cervix. Musculoskeletal: Normal range of motion. Right lower leg: No edema. Left lower leg: No edema. Skin:     General: Skin is warm and dry. Findings: No erythema or rash. Neurological:      General: No focal deficit present. Mental Status: She is alert and oriented to person, place, and time. Diagnostic Study Results     Labs -     Recent Results (from the past 12 hour(s))   CBC WITH AUTOMATED DIFF    Collection Time: 06/14/20  8:43 PM   Result Value Ref Range    WBC 7.8 3.6 - 11.0 K/uL    RBC 4.70 3.80 - 5.20 M/uL    HGB 13.0 11.5 - 16.0 g/dL    HCT 39.4 35.0 - 47.0 %    MCV 83.8 80.0 - 99.0 FL    MCH 27.7 26.0 - 34.0 PG    MCHC 33.0 30.0 - 36.5 g/dL    RDW 13.0 11.5 - 14.5 %    PLATELET 643 959 - 187 K/uL    MPV 9.9 8.9 - 12.9 FL    NRBC 0.0 0  WBC    ABSOLUTE NRBC 0.00 0.00 - 0.01 K/uL    NEUTROPHILS 61 32 - 75 %    LYMPHOCYTES 23 12 - 49 %    MONOCYTES 12 5 - 13 %    EOSINOPHILS 2 0 - 7 %    BASOPHILS 1 0 - 1 %    IMMATURE GRANULOCYTES 1 (H) 0.0 - 0.5 %    ABS. NEUTROPHILS 4.9 1.8 - 8.0 K/UL    ABS. LYMPHOCYTES 1.8 0.8 - 3.5 K/UL    ABS. MONOCYTES 0.9 0.0 - 1.0 K/UL    ABS. EOSINOPHILS 0.1 0.0 - 0.4 K/UL    ABS. BASOPHILS 0.0 0.0 - 0.1 K/UL    ABS. IMM.  GRANS. 0.0 0.00 - 0.04 K/UL    DF AUTOMATED     METABOLIC PANEL, BASIC    Collection Time: 06/14/20  8:43 PM   Result Value Ref Range    Sodium 138 136 - 145 mmol/L    Potassium 3.7 3.5 - 5.1 mmol/L    Chloride 105 97 - 108 mmol/L    CO2 25 21 - 32 mmol/L    Anion gap 8 5 - 15 mmol/L    Glucose 94 65 - 100 mg/dL    BUN 12 6 - 20 MG/DL    Creatinine 0.88 0.55 - 1.02 MG/DL    BUN/Creatinine ratio 14 12 - 20      GFR est AA >60 >60 ml/min/1.73m2    GFR est non-AA >60 >60 ml/min/1.73m2    Calcium 8.6 8.5 - 10.1 MG/DL   TYPE & SCREEN    Collection Time: 06/14/20  8:43 PM   Result Value Ref Range    Crossmatch Expiration 06/17/2020     ABO/Rh(D) O POSITIVE     Antibody screen NEG    URINALYSIS W/ REFLEX CULTURE    Collection Time: 06/14/20  8:43 PM   Result Value Ref Range    Color YELLOW/STRAW      Appearance CLOUDY (A) CLEAR      Specific gravity >1.030 (H) 1.003 - 1.030    pH (UA) 6.0 5.0 - 8.0      Protein TRACE (A) NEG mg/dL    Glucose Negative NEG mg/dL    Ketone Negative NEG mg/dL    Bilirubin Negative NEG      Blood LARGE (A) NEG      Urobilinogen 1.0 0.2 - 1.0 EU/dL    Nitrites Negative NEG      Leukocyte Esterase Negative NEG      WBC 0-4 0 - 4 /hpf    RBC  0 - 5 /hpf    Epithelial cells MODERATE (A) FEW /lpf    Bacteria Negative NEG /hpf    UA:UC IF INDICATED CULTURE NOT INDICATED BY UA RESULT CNI     BETA HCG, QT    Collection Time: 06/14/20  8:43 PM   Result Value Ref Range    Beta HCG, QT 83 (H) 0 - 6 MIU/ML       Radiologic Studies -   US UTS TRANSVAGINAL OB   Final Result   IMPRESSION: Trace free fluid in the right adnexa and cul-de-sac. No intrauterine   gestation or ectopic gestation visualized. Recommend correlation with serial   beta hCG and follow-up ultrasound, if indicated. CT Results  (Last 48 hours)    None        CXR Results  (Last 48 hours)    None          Medical Decision Making   I am the first provider for this patient. I reviewed the vital signs, available nursing notes, past medical history, past surgical history, family history and social history. Vital Signs-Reviewed the patient's vital signs.   Patient Vitals for the past 12 hrs:   Temp Pulse Resp BP SpO2   06/14/20 2215 -- -- -- -- 100 %   06/14/20 2200 -- -- -- 128/85 100 %   06/14/20 2147 -- 85 20 134/90 --   06/14/20 2146 -- -- -- 134/90 100 %   06/14/20 2145 -- -- -- -- 100 %   06/14/20 2144 -- 78 20 129/74 100 %   06/14/20 2142 -- -- -- 126/85 100 %   06/14/20 2140 -- -- -- 126/85 100 %   06/14/20 2130 -- -- -- -- 100 %   06/14/20 1956 98.7 °F (37.1 °C) 73 18 128/85 100 %       Records Reviewed: Nursing Notes, Old Medical Records, Previous Radiology Studies and Previous Laboratory Studies    Provider Notes (Medical Decision Making): This is a 34 y.o. female here with near syncope and lower abdominal discomfort with significant vaginal bleeding shortly after finding out she is pregnant. On examination patient appears clinically well and nontoxic. Vital signs stable throughout entire ED stay and patient is afebrile. Hemodynamically stable throughout entire ED stay. differential diagnosis includes: Miscarriage, ectopic pregnancy, threatened miscarriage, first trimester bleeding. I will pursue work-up consisting of ultrasound, blood work and reassess the patient. On reexamination, patient still feeling orthostatic when she stands up. Orthostatic vital signs within normal limits however. Will recheck hemoglobin in 3 hours to ensure no significant drop. She will be treated with IV fluids. Ultrasound does not show intrauterine gestational sac that was present on last visit. No sign of ectopic pregnancy today. Beta-hCG is also decreased now from 150 down to 80. Patient notified of this likely representing miscarriage. She is still strongly encouraged to follow-up with her OB/GYN this week. Will recheck hemoglobin and orthostatic vital signs after administration of IV fluids. I feel patient can be discharged to follow-up as an outpatient. Patient is strongly encouraged to follow-up with her OB/GYN within 1-3 days. The patient and any family members present at bedside are given strict return to ED precautions immediately for persistent or worsening symptoms including syncope, worsening abdominal pain, pelvic pain, worsening vaginal bleeding.   Patient and available family express their understanding and agreement with plan as above and all of their questions have been answered. This patient's case will be signed out to my colleague, Dr. Falguni Saldana, pending repeat hemoglobin and orthostatic vitals after IV fluids. Patient's disposition is likely home with OB/GYN follow-up. their help is greatly appreciated, but it should be noted that I will be the primary provider of record for this patient's ED encounter. ED Course:   Initial assessment performed. The patients presenting problems have been discussed, and they are in agreement with the care plan formulated and outlined with them. I have encouraged them to ask questions as they arise throughout their visit. Discharge Note:  The patient has been re-evaluated and is ready for discharge. Reviewed available results with patient. Counseled patient on diagnosis and care plan. Patient has expressed understanding, and all questions have been answered. Patient agrees with plan and agrees to follow up as recommended, or to return to the ED if their symptoms worsen. Discharge instructions have been provided and explained to the patient, along with reasons to return to the ED. Disposition:  Discharge home with OB/GYN follow-up      DISCHARGE PLAN:  1. Current Discharge Medication List        2. Follow-up Information     Follow up With Specialties Details Why Contact Info    Nathan Husain MD Obstetrics & Gynecology, Gynecology, Obstetrics Schedule an appointment as soon as possible for a visit   [address] [place] [address]  [phone]          3. Return to ED if worse     Diagnosis     Clinical Impression:   1. Miscarriage    2. Vaginal bleeding    3. Near syncope    4. Orthostasis        Attestations:    Caity Iqbal MD    Please note that this dictation was completed with [employer], the computer voice recognition software.   Quite often unanticipated grammatical, syntax, homophones, and other interpretive errors are inadvertently transcribed by the computer software. Please disregard these errors. Please excuse any errors that have escaped final proofreading. Thank you.

## 2020-06-15 NOTE — ED NOTES
Report received from Russ Davison RN. They advised of the patient's chief complaint, current status, orders completed (to include IV access/medications/radiology testing), outstanding orders that still need to be completed, and the treatment plan. Questions asked and answered prior to assumption of care.

## 2021-06-22 ENCOUNTER — APPOINTMENT (OUTPATIENT)
Dept: CT IMAGING | Age: 31
End: 2021-06-22
Attending: EMERGENCY MEDICINE
Payer: MEDICAID

## 2021-06-22 ENCOUNTER — HOSPITAL ENCOUNTER (EMERGENCY)
Age: 31
Discharge: HOME | End: 2021-06-22
Attending: EMERGENCY MEDICINE
Payer: MEDICAID

## 2021-06-22 VITALS
OXYGEN SATURATION: 100 % | RESPIRATION RATE: 16 BRPM | SYSTOLIC BLOOD PRESSURE: 121 MMHG | BODY MASS INDEX: 36.19 KG/M2 | TEMPERATURE: 98.2 F | HEART RATE: 60 BPM | DIASTOLIC BLOOD PRESSURE: 87 MMHG | WEIGHT: 230.6 LBS | HEIGHT: 67 IN

## 2021-06-22 DIAGNOSIS — R10.2 PELVIC PAIN: ICD-10-CM

## 2021-06-22 DIAGNOSIS — N30.01 ACUTE CYSTITIS WITH HEMATURIA: Primary | ICD-10-CM

## 2021-06-22 LAB
ALBUMIN SERPL-MCNC: 3.4 G/DL (ref 3.5–5)
ALBUMIN/GLOB SERPL: 0.9 {RATIO} (ref 1.1–2.2)
ALP SERPL-CCNC: 71 U/L (ref 45–117)
ALT SERPL-CCNC: 25 U/L (ref 12–78)
ANION GAP SERPL CALC-SCNC: 3 MMOL/L (ref 5–15)
APPEARANCE UR: ABNORMAL
AST SERPL-CCNC: 21 U/L (ref 15–37)
BACTERIA URNS QL MICRO: ABNORMAL /HPF
BASOPHILS # BLD: 0 K/UL (ref 0–0.1)
BASOPHILS NFR BLD: 0 % (ref 0–1)
BILIRUB SERPL-MCNC: 0.8 MG/DL (ref 0.2–1)
BILIRUB UR QL: NEGATIVE
BUN SERPL-MCNC: 9 MG/DL (ref 6–20)
BUN/CREAT SERPL: 11 (ref 12–20)
CALCIUM SERPL-MCNC: 8.4 MG/DL (ref 8.5–10.1)
CHLORIDE SERPL-SCNC: 108 MMOL/L (ref 97–108)
CO2 SERPL-SCNC: 28 MMOL/L (ref 21–32)
COLOR UR: ABNORMAL
CREAT SERPL-MCNC: 0.84 MG/DL (ref 0.55–1.02)
DIFFERENTIAL METHOD BLD: NORMAL
EOSINOPHIL # BLD: 0.1 K/UL (ref 0–0.4)
EOSINOPHIL NFR BLD: 1 % (ref 0–7)
EPITH CASTS URNS QL MICRO: ABNORMAL /LPF
ERYTHROCYTE [DISTWIDTH] IN BLOOD BY AUTOMATED COUNT: 12.7 % (ref 11.5–14.5)
GLOBULIN SER CALC-MCNC: 3.9 G/DL (ref 2–4)
GLUCOSE SERPL-MCNC: 95 MG/DL (ref 65–100)
GLUCOSE UR STRIP.AUTO-MCNC: NEGATIVE MG/DL
HCG UR QL: NEGATIVE
HCT VFR BLD AUTO: 39.1 % (ref 35–47)
HGB BLD-MCNC: 12.3 G/DL (ref 11.5–16)
HGB UR QL STRIP: ABNORMAL
IMM GRANULOCYTES # BLD AUTO: 0 K/UL (ref 0–0.04)
IMM GRANULOCYTES NFR BLD AUTO: 0 % (ref 0–0.5)
KETONES UR QL STRIP.AUTO: NEGATIVE MG/DL
LEUKOCYTE ESTERASE UR QL STRIP.AUTO: ABNORMAL
LYMPHOCYTES # BLD: 2.4 K/UL (ref 0.8–3.5)
LYMPHOCYTES NFR BLD: 26 % (ref 12–49)
MCH RBC QN AUTO: 27.2 PG (ref 26–34)
MCHC RBC AUTO-ENTMCNC: 31.5 G/DL (ref 30–36.5)
MCV RBC AUTO: 86.5 FL (ref 80–99)
MONOCYTES # BLD: 0.6 K/UL (ref 0–1)
MONOCYTES NFR BLD: 6 % (ref 5–13)
MUCOUS THREADS URNS QL MICRO: ABNORMAL /LPF
NEUTS SEG # BLD: 6.1 K/UL (ref 1.8–8)
NEUTS SEG NFR BLD: 67 % (ref 32–75)
NITRITE UR QL STRIP.AUTO: NEGATIVE
NRBC # BLD: 0 K/UL (ref 0–0.01)
NRBC BLD-RTO: 0 PER 100 WBC
PH UR STRIP: 6 [PH] (ref 5–8)
PLATELET # BLD AUTO: 327 K/UL (ref 150–400)
PMV BLD AUTO: 9.8 FL (ref 8.9–12.9)
POTASSIUM SERPL-SCNC: 4 MMOL/L (ref 3.5–5.1)
PROT SERPL-MCNC: 7.3 G/DL (ref 6.4–8.2)
PROT UR STRIP-MCNC: 30 MG/DL
RBC # BLD AUTO: 4.52 M/UL (ref 3.8–5.2)
RBC #/AREA URNS HPF: ABNORMAL /HPF (ref 0–5)
SODIUM SERPL-SCNC: 139 MMOL/L (ref 136–145)
SP GR UR REFRACTOMETRY: >1.03 (ref 1–1.03)
UA: UC IF INDICATED,UAUC: ABNORMAL
UROBILINOGEN UR QL STRIP.AUTO: 1 EU/DL (ref 0.2–1)
WBC # BLD AUTO: 9.3 K/UL (ref 3.6–11)
WBC URNS QL MICRO: ABNORMAL /HPF (ref 0–4)

## 2021-06-22 PROCEDURE — 81025 URINE PREGNANCY TEST: CPT

## 2021-06-22 PROCEDURE — 36415 COLL VENOUS BLD VENIPUNCTURE: CPT

## 2021-06-22 PROCEDURE — 74011250637 HC RX REV CODE- 250/637: Performed by: EMERGENCY MEDICINE

## 2021-06-22 PROCEDURE — 81001 URINALYSIS AUTO W/SCOPE: CPT

## 2021-06-22 PROCEDURE — 96374 THER/PROPH/DIAG INJ IV PUSH: CPT

## 2021-06-22 PROCEDURE — 87086 URINE CULTURE/COLONY COUNT: CPT

## 2021-06-22 PROCEDURE — 74177 CT ABD & PELVIS W/CONTRAST: CPT

## 2021-06-22 PROCEDURE — 74011250636 HC RX REV CODE- 250/636: Performed by: EMERGENCY MEDICINE

## 2021-06-22 PROCEDURE — 85025 COMPLETE CBC W/AUTO DIFF WBC: CPT

## 2021-06-22 PROCEDURE — 80053 COMPREHEN METABOLIC PANEL: CPT

## 2021-06-22 PROCEDURE — 99284 EMERGENCY DEPT VISIT MOD MDM: CPT

## 2021-06-22 PROCEDURE — 96375 TX/PRO/DX INJ NEW DRUG ADDON: CPT

## 2021-06-22 PROCEDURE — 74011000636 HC RX REV CODE- 636: Performed by: EMERGENCY MEDICINE

## 2021-06-22 RX ORDER — MORPHINE SULFATE 2 MG/ML
4 INJECTION, SOLUTION INTRAMUSCULAR; INTRAVENOUS
Status: COMPLETED | OUTPATIENT
Start: 2021-06-22 | End: 2021-06-22

## 2021-06-22 RX ORDER — ONDANSETRON 2 MG/ML
4 INJECTION INTRAMUSCULAR; INTRAVENOUS
Status: COMPLETED | OUTPATIENT
Start: 2021-06-22 | End: 2021-06-22

## 2021-06-22 RX ORDER — CEFDINIR 300 MG/1
300 CAPSULE ORAL 2 TIMES DAILY
Qty: 14 CAPSULE | Refills: 0 | Status: SHIPPED | OUTPATIENT
Start: 2021-06-22 | End: 2021-06-29

## 2021-06-22 RX ORDER — PHENAZOPYRIDINE HYDROCHLORIDE 100 MG/1
200 TABLET, FILM COATED ORAL
Status: COMPLETED | OUTPATIENT
Start: 2021-06-22 | End: 2021-06-22

## 2021-06-22 RX ORDER — PHENAZOPYRIDINE HYDROCHLORIDE 100 MG/1
200 TABLET, FILM COATED ORAL 3 TIMES DAILY
Qty: 18 TABLET | Refills: 0 | Status: SHIPPED | OUTPATIENT
Start: 2021-06-22 | End: 2021-06-25

## 2021-06-22 RX ADMIN — MORPHINE SULFATE 4 MG: 2 INJECTION, SOLUTION INTRAMUSCULAR; INTRAVENOUS at 10:38

## 2021-06-22 RX ADMIN — PHENAZOPYRIDINE HYDROCHLORIDE 200 MG: 100 TABLET ORAL at 10:38

## 2021-06-22 RX ADMIN — ONDANSETRON 4 MG: 2 INJECTION INTRAMUSCULAR; INTRAVENOUS at 10:38

## 2021-06-22 RX ADMIN — IOPAMIDOL 100 ML: 755 INJECTION, SOLUTION INTRAVENOUS at 11:04

## 2021-06-22 NOTE — DISCHARGE INSTRUCTIONS
You are seen in the emergency department for pelvic pain and have been diagnosed with a urinary tract infection. Please  and take as directed the antibiotic and bladder pain medication. Please drink plenty of fluids to the antibiotic it is down to your bladder. Please follow-up with your primary care doctor as needed and return if you develop fever, worsening pain, inability to urinate, new blood in your stool or other new concerning symptoms.

## 2021-06-22 NOTE — ED PROVIDER NOTES
EMERGENCY DEPARTMENT HISTORY AND PHYSICAL EXAM      Date: 6/22/2021  Patient Name: Laverne Palacios    History of Presenting Illness     Chief Complaint   Patient presents with    Abdominal Pain     lower abdominal pain radiating around to the bilateral back. vomited once yesterday. now only nauseous. no diarrhea or constipation       History Provided By: Patient    HPI: Laverne Palacios, 31 y.o. female presents to the ED with lower abdominal pain and low back pain that started yesterday. Patient had one episode of vomiting yesterday, current nausea without diarrhea or constipation last BM within the last 24 hours. Patient says pain is mostly felt denies abnormal vaginal discharge or vaginal bleeding, no dysuria, urgency or frequency. There are no other complaints, changes, or physical findings at this time. PCP: None    No current facility-administered medications on file prior to encounter. Current Outpatient Medications on File Prior to Encounter   Medication Sig Dispense Refill    ondansetron (ZOFRAN ODT) 4 mg disintegrating tablet Take 1 Tab by mouth every eight (8) hours as needed for Nausea or Vomiting. 20 Tab 0       Past History     Past Medical History:  Past Medical History:   Diagnosis Date    Anemia     Costochondritis        Past Surgical History:  Past Surgical History:   Procedure Laterality Date    HX ORTHOPAEDIC      Right fourth finger, \"Digit widgit\"       Family History:  Family History   Family history unknown: Yes       Social History:  Social History     Tobacco Use    Smoking status: Former Smoker    Smokeless tobacco: Never Used    Tobacco comment: \"only when I drink\"   Substance Use Topics    Alcohol use: No     Comment: Occasionally    Drug use: No       Allergies: Allergies   Allergen Reactions    Motrin [Ibuprofen] Hives         Review of Systems   Review of Systems   Constitutional: Negative. HENT:        Patient is a non-smoker. Previously smoked. Respiratory: Negative. Cardiovascular: Negative. Gastrointestinal: Positive for abdominal pain, nausea and vomiting. Negative for blood in stool, constipation, diarrhea and rectal pain. No history of diverticulitis, colitis, appendicitis or gallstones or kidney stones. Genitourinary: Positive for pelvic pain. Musculoskeletal: Negative for back pain. Neurological: Negative. All other systems reviewed and are negative. Physical Exam   Physical Exam   Vital signs and nursing notes reviewed    CONSTITUTIONAL: Alert, in moderate distress; well-developed; well-nourished. HEAD:  Normocephalic, atraumatic  EYES: PERRL; EOM's intact. ENTM: Nose: no rhinorrhea; Throat: no erythema or exudate, mucous membranes moist  Neck:  Supple. trachea is midline. RESP: Chest clear, equal breath sounds. - W/R/R  CV: S1 and S2 WNL; No murmurs, gallops or rubs. 2+ radial and DP pulses bilaterally. GI: non-distended, normal bowel sounds, abdomen soft with tenderness in the bilateral lower quadrants, left greater than right, obturator externus manipulation yields pain, heel strike manipulation yields pain in the abdomen. . No masses or organomegaly. : No costo-vertebral angle tenderness. BACK:  Non-tender, normal appearance  UPPER EXT:  Normal inspection. no joint or soft tissue swelling  LOWER EXT: No edema, no calf tenderness. NEURO: Alert and oriented x3, 5/5 strength and light touch sensation intact in bilateral upper and lower extremities. SKIN: No rashes;  Warm and dry  PSYCH: Normal mood, normal affect    Diagnostic Study Results     Labs -     Recent Results (from the past 12 hour(s))   HCG URINE, QL. - POC    Collection Time: 06/22/21  8:21 AM   Result Value Ref Range    Pregnancy test,urine (POC) Negative NEG     URINALYSIS W/ REFLEX CULTURE    Collection Time: 06/22/21  8:23 AM    Specimen: Urine   Result Value Ref Range    Color YELLOW/STRAW      Appearance CLOUDY (A) CLEAR      Specific gravity >1.030 (H) 1.003 - 1.030    pH (UA) 6.0 5.0 - 8.0      Protein 30 (A) NEG mg/dL    Glucose Negative NEG mg/dL    Ketone Negative NEG mg/dL    Bilirubin Negative NEG      Blood SMALL (A) NEG      Urobilinogen 1.0 0.2 - 1.0 EU/dL    Nitrites Negative NEG      Leukocyte Esterase MODERATE (A) NEG      WBC  0 - 4 /hpf    RBC 5-10 0 - 5 /hpf    Epithelial cells MODERATE (A) FEW /lpf    Bacteria 3+ (A) NEG /hpf    UA:UC IF INDICATED URINE CULTURE ORDERED (A) CNI      Mucus 2+ (A) NEG /lpf   CBC WITH AUTOMATED DIFF    Collection Time: 06/22/21  8:23 AM   Result Value Ref Range    WBC 9.3 3.6 - 11.0 K/uL    RBC 4.52 3.80 - 5.20 M/uL    HGB 12.3 11.5 - 16.0 g/dL    HCT 39.1 35.0 - 47.0 %    MCV 86.5 80.0 - 99.0 FL    MCH 27.2 26.0 - 34.0 PG    MCHC 31.5 30.0 - 36.5 g/dL    RDW 12.7 11.5 - 14.5 %    PLATELET 729 672 - 378 K/uL    MPV 9.8 8.9 - 12.9 FL    NRBC 0.0 0  WBC    ABSOLUTE NRBC 0.00 0.00 - 0.01 K/uL    NEUTROPHILS 67 32 - 75 %    LYMPHOCYTES 26 12 - 49 %    MONOCYTES 6 5 - 13 %    EOSINOPHILS 1 0 - 7 %    BASOPHILS 0 0 - 1 %    IMMATURE GRANULOCYTES 0 0.0 - 0.5 %    ABS. NEUTROPHILS 6.1 1.8 - 8.0 K/UL    ABS. LYMPHOCYTES 2.4 0.8 - 3.5 K/UL    ABS. MONOCYTES 0.6 0.0 - 1.0 K/UL    ABS. EOSINOPHILS 0.1 0.0 - 0.4 K/UL    ABS. BASOPHILS 0.0 0.0 - 0.1 K/UL    ABS. IMM. GRANS. 0.0 0.00 - 0.04 K/UL    DF AUTOMATED     METABOLIC PANEL, COMPREHENSIVE    Collection Time: 06/22/21  8:23 AM   Result Value Ref Range    Sodium 139 136 - 145 mmol/L    Potassium 4.0 3.5 - 5.1 mmol/L    Chloride 108 97 - 108 mmol/L    CO2 28 21 - 32 mmol/L    Anion gap 3 (L) 5 - 15 mmol/L    Glucose 95 65 - 100 mg/dL    BUN 9 6 - 20 MG/DL    Creatinine 0.84 0.55 - 1.02 MG/DL    BUN/Creatinine ratio 11 (L) 12 - 20      GFR est AA >60 >60 ml/min/1.73m2    GFR est non-AA >60 >60 ml/min/1.73m2    Calcium 8.4 (L) 8.5 - 10.1 MG/DL    Bilirubin, total 0.8 0.2 - 1.0 MG/DL    ALT (SGPT) 25 12 - 78 U/L    AST (SGOT) 21 15 - 37 U/L    Alk. phosphatase 71 45 - 117 U/L    Protein, total 7.3 6.4 - 8.2 g/dL    Albumin 3.4 (L) 3.5 - 5.0 g/dL    Globulin 3.9 2.0 - 4.0 g/dL    A-G Ratio 0.9 (L) 1.1 - 2.2         Radiologic Studies -   CT ABD PELV W CONT   Final Result   No acute abnormality. CT Results  (Last 48 hours)               06/22/21 1106  CT ABD PELV W CONT Final result    Impression:  No acute abnormality. Narrative:  EXAM: CT ABD PELV W CONT       INDICATION: LLQ pain with obturator externus sign, eval for appendicitis       COMPARISON: None        CONTRAST: 100 mL of Isovue-370. TECHNIQUE:    Following the uneventful intravenous administration of contrast, thin axial   images were obtained through the abdomen and pelvis. Coronal and sagittal   reconstructions were generated. Oral contrast was not administered. CT dose   reduction was achieved through use of a standardized protocol tailored for this   examination and automatic exposure control for dose modulation. FINDINGS:    LOWER THORAX: No significant abnormality in the incidentally imaged lower chest.   LIVER: No mass. BILIARY TREE: Gallbladder is within normal limits. CBD is not dilated. SPLEEN: within normal limits. PANCREAS: No mass or ductal dilatation. ADRENALS: Unremarkable. KIDNEYS: No mass, calculus, or hydronephrosis. STOMACH: Unremarkable. SMALL BOWEL: No dilatation or wall thickening. COLON: No dilatation or wall thickening. APPENDIX: Within normal limits. PERITONEUM: No ascites or pneumoperitoneum. RETROPERITONEUM: No lymphadenopathy or aortic aneurysm. REPRODUCTIVE ORGANS: There is no pelvic mass or lymphadenopathy. URINARY BLADDER: No mass or calculus. BONES: No destructive bone lesion. ABDOMINAL WALL: No mass or hernia. ADDITIONAL COMMENTS: N/A               CXR Results  (Last 48 hours)    None          Medical Decision Making   I am the first provider for this patient.     I reviewed the vital signs, available nursing notes, past medical history, past surgical history, family history and social history. Vital Signs-Reviewed the patient's vital signs. Patient Vitals for the past 12 hrs:   Temp Pulse Resp BP SpO2   06/22/21 1042 -- -- -- 121/87 100 %   06/22/21 0848 -- 60 -- 123/83 100 %   06/22/21 0809 98.2 °F (36.8 °C) (!) 57 16 (!) 142/94 100 %         Records Reviewed: Nursing notes    Provider Notes (Medical Decision Making):   73-year-old female with abnormal urinalysis with reassuring CT imaging abscess or perforation. There is no kidney stone patient hemodynamically stable, will send with antibiotics and Pyridium to ease bladder pain. ED Course:   Initial assessment performed. The patients presenting problems have been discussed, and they are in agreement with the care plan formulated and outlined with them. I have encouraged them to ask questions as they arise throughout their visit. Disposition:  Discharge    Discharge Note:  1:14 PM  The pt is ready for discharge. The pt's signs, symptoms, diagnosis, and discharge instructions have been discussed and pt has conveyed their understanding. The pt is to follow up as recommended or return to ER should their symptoms worsen. Plan has been discussed and pt is in agreement. DISCHARGE PLAN:  1. Current Discharge Medication List      START taking these medications    Details   cefdinir (OMNICEF) 300 mg capsule Take 1 Capsule by mouth two (2) times a day for 7 days. Qty: 14 Capsule, Refills: 0  Start date: 6/22/2021, End date: 6/29/2021      phenazopyridine (PYRIDIUM) 100 mg tablet Take 2 Tablets by mouth three (3) times daily for 3 days. Qty: 18 Tablet, Refills: 0  Start date: 6/22/2021, End date: 6/25/2021           2.    Follow-up Information     Follow up With Specialties Details Why Contact Info    [facility] EMERGENCY DEPT Emergency Medicine  If symptoms worsen including worsening pain, inability urinate, blood in her stool, fever or other new concerning symptoms. [address] [place] [address]  [phone]        3. Return to ED if worse     Diagnosis     Clinical Impression:   1. Acute cystitis with hematuria    2. Pelvic pain        Attestations:    Salas Barry MD    Please note that this dictation was completed with [employer], the computer voice recognition software. Quite often unanticipated grammatical, syntax, homophones, and other interpretive errors are inadvertently transcribed by the computer software. Please disregard these errors. Please excuse any errors that have escaped final proofreading. Thank you.

## 2021-06-22 NOTE — Clinical Note
[address] 
[facility] EMERGENCY DEPT 
[address] [facility] [id number] [phone] Work/School Note Date: 6/22/2021 To Whom It May concern: 
 
Abisai Yang was seen and treated today in the emergency room by the following provider(s): 
Attending Provider: Chasidy Epps MD. Abisai Yang is excused from work/school on 06/22/21 and 06/23/21. She is medically clear to return to work/school on 6/24/2021. Sincerely, Yo Rivero MD

## 2021-06-23 LAB
BACTERIA SPEC CULT: NORMAL
SERVICE CMNT-IMP: NORMAL

## 2021-06-24 ENCOUNTER — APPOINTMENT (OUTPATIENT)
Dept: ULTRASOUND IMAGING | Age: 31
End: 2021-06-24
Attending: EMERGENCY MEDICINE
Payer: MEDICAID

## 2021-06-24 ENCOUNTER — HOSPITAL ENCOUNTER (EMERGENCY)
Age: 31
Discharge: HOME | End: 2021-06-24
Attending: EMERGENCY MEDICINE
Payer: MEDICAID

## 2021-06-24 VITALS
DIASTOLIC BLOOD PRESSURE: 80 MMHG | RESPIRATION RATE: 14 BRPM | WEIGHT: 230 LBS | SYSTOLIC BLOOD PRESSURE: 117 MMHG | BODY MASS INDEX: 36.1 KG/M2 | HEIGHT: 67 IN | OXYGEN SATURATION: 99 % | HEART RATE: 85 BPM | TEMPERATURE: 98.3 F

## 2021-06-24 DIAGNOSIS — N10 ACUTE PYELONEPHRITIS: Primary | ICD-10-CM

## 2021-06-24 LAB
ALBUMIN SERPL-MCNC: 3.7 G/DL (ref 3.5–5)
ALBUMIN/GLOB SERPL: 0.9 {RATIO} (ref 1.1–2.2)
ALP SERPL-CCNC: 78 U/L (ref 45–117)
ALT SERPL-CCNC: 24 U/L (ref 12–78)
ANION GAP SERPL CALC-SCNC: 3 MMOL/L (ref 5–15)
APPEARANCE UR: ABNORMAL
AST SERPL-CCNC: 14 U/L (ref 15–37)
BACTERIA URNS QL MICRO: ABNORMAL /HPF
BASOPHILS # BLD: 0.1 K/UL (ref 0–0.1)
BASOPHILS NFR BLD: 0 % (ref 0–1)
BILIRUB SERPL-MCNC: 0.7 MG/DL (ref 0.2–1)
BILIRUB UR QL CFM: NEGATIVE
BUN SERPL-MCNC: 7 MG/DL (ref 6–20)
BUN/CREAT SERPL: 8 (ref 12–20)
CALCIUM SERPL-MCNC: 8.9 MG/DL (ref 8.5–10.1)
CHLORIDE SERPL-SCNC: 107 MMOL/L (ref 97–108)
CO2 SERPL-SCNC: 27 MMOL/L (ref 21–32)
COLOR UR: ABNORMAL
CREAT SERPL-MCNC: 0.86 MG/DL (ref 0.55–1.02)
DIFFERENTIAL METHOD BLD: ABNORMAL
EOSINOPHIL # BLD: 0.1 K/UL (ref 0–0.4)
EOSINOPHIL NFR BLD: 1 % (ref 0–7)
EPITH CASTS URNS QL MICRO: ABNORMAL /LPF
ERYTHROCYTE [DISTWIDTH] IN BLOOD BY AUTOMATED COUNT: 12.7 % (ref 11.5–14.5)
GLOBULIN SER CALC-MCNC: 4.1 G/DL (ref 2–4)
GLUCOSE SERPL-MCNC: 95 MG/DL (ref 65–100)
GLUCOSE UR STRIP.AUTO-MCNC: NEGATIVE MG/DL
GRAN CASTS URNS QL MICRO: ABNORMAL /LPF
HCG SERPL-ACNC: <1 MIU/ML (ref 0–6)
HCT VFR BLD AUTO: 40.2 % (ref 35–47)
HGB BLD-MCNC: 12.8 G/DL (ref 11.5–16)
HGB UR QL STRIP: NEGATIVE
IMM GRANULOCYTES # BLD AUTO: 0 K/UL (ref 0–0.04)
IMM GRANULOCYTES NFR BLD AUTO: 0 % (ref 0–0.5)
KETONES UR QL STRIP.AUTO: ABNORMAL MG/DL
LEUKOCYTE ESTERASE UR QL STRIP.AUTO: ABNORMAL
LYMPHOCYTES # BLD: 2.7 K/UL (ref 0.8–3.5)
LYMPHOCYTES NFR BLD: 20 % (ref 12–49)
MCH RBC QN AUTO: 26.9 PG (ref 26–34)
MCHC RBC AUTO-ENTMCNC: 31.8 G/DL (ref 30–36.5)
MCV RBC AUTO: 84.6 FL (ref 80–99)
MONOCYTES # BLD: 0.8 K/UL (ref 0–1)
MONOCYTES NFR BLD: 6 % (ref 5–13)
NEUTS SEG # BLD: 9.9 K/UL (ref 1.8–8)
NEUTS SEG NFR BLD: 73 % (ref 32–75)
NITRITE UR QL STRIP.AUTO: POSITIVE
NRBC # BLD: 0 K/UL (ref 0–0.01)
NRBC BLD-RTO: 0 PER 100 WBC
PH UR STRIP: 5 [PH] (ref 5–8)
PLATELET # BLD AUTO: 342 K/UL (ref 150–400)
PMV BLD AUTO: 10 FL (ref 8.9–12.9)
POTASSIUM SERPL-SCNC: 4 MMOL/L (ref 3.5–5.1)
PROT SERPL-MCNC: 7.8 G/DL (ref 6.4–8.2)
PROT UR STRIP-MCNC: NEGATIVE MG/DL
RBC # BLD AUTO: 4.75 M/UL (ref 3.8–5.2)
RBC #/AREA URNS HPF: ABNORMAL /HPF (ref 0–5)
SODIUM SERPL-SCNC: 137 MMOL/L (ref 136–145)
SP GR UR REFRACTOMETRY: 1.01 (ref 1–1.03)
UA: UC IF INDICATED,UAUC: ABNORMAL
UROBILINOGEN UR QL STRIP.AUTO: 2 EU/DL (ref 0.2–1)
WBC # BLD AUTO: 13.6 K/UL (ref 3.6–11)
WBC URNS QL MICRO: ABNORMAL /HPF (ref 0–4)

## 2021-06-24 PROCEDURE — 74011250636 HC RX REV CODE- 250/636: Performed by: EMERGENCY MEDICINE

## 2021-06-24 PROCEDURE — 99284 EMERGENCY DEPT VISIT MOD MDM: CPT

## 2021-06-24 PROCEDURE — 76830 TRANSVAGINAL US NON-OB: CPT

## 2021-06-24 PROCEDURE — 96375 TX/PRO/DX INJ NEW DRUG ADDON: CPT

## 2021-06-24 PROCEDURE — 36415 COLL VENOUS BLD VENIPUNCTURE: CPT

## 2021-06-24 PROCEDURE — 80053 COMPREHEN METABOLIC PANEL: CPT

## 2021-06-24 PROCEDURE — 96365 THER/PROPH/DIAG IV INF INIT: CPT

## 2021-06-24 PROCEDURE — 76856 US EXAM PELVIC COMPLETE: CPT

## 2021-06-24 PROCEDURE — 81001 URINALYSIS AUTO W/SCOPE: CPT

## 2021-06-24 PROCEDURE — 87086 URINE CULTURE/COLONY COUNT: CPT

## 2021-06-24 PROCEDURE — 84702 CHORIONIC GONADOTROPIN TEST: CPT

## 2021-06-24 PROCEDURE — 74011000258 HC RX REV CODE- 258: Performed by: EMERGENCY MEDICINE

## 2021-06-24 PROCEDURE — 85025 COMPLETE CBC W/AUTO DIFF WBC: CPT

## 2021-06-24 RX ORDER — ONDANSETRON 2 MG/ML
4 INJECTION INTRAMUSCULAR; INTRAVENOUS ONCE
Status: COMPLETED | OUTPATIENT
Start: 2021-06-24 | End: 2021-06-24

## 2021-06-24 RX ORDER — ONDANSETRON 2 MG/ML
INJECTION INTRAMUSCULAR; INTRAVENOUS
Status: DISCONTINUED
Start: 2021-06-24 | End: 2021-06-24 | Stop reason: HOSPADM

## 2021-06-24 RX ORDER — MORPHINE SULFATE 2 MG/ML
2 INJECTION, SOLUTION INTRAMUSCULAR; INTRAVENOUS ONCE
Status: COMPLETED | OUTPATIENT
Start: 2021-06-24 | End: 2021-06-24

## 2021-06-24 RX ORDER — NAPROXEN 500 MG/1
500 TABLET ORAL
Qty: 20 TABLET | Refills: 0 | Status: SHIPPED | OUTPATIENT
Start: 2021-06-24 | End: 2022-01-05

## 2021-06-24 RX ORDER — KETOROLAC TROMETHAMINE 30 MG/ML
15 INJECTION, SOLUTION INTRAMUSCULAR; INTRAVENOUS ONCE
Status: COMPLETED | OUTPATIENT
Start: 2021-06-24 | End: 2021-06-24

## 2021-06-24 RX ADMIN — CEFTRIAXONE 1 G: 1 INJECTION, POWDER, FOR SOLUTION INTRAMUSCULAR; INTRAVENOUS at 18:37

## 2021-06-24 RX ADMIN — ONDANSETRON 4 MG: 2 INJECTION INTRAMUSCULAR; INTRAVENOUS at 17:04

## 2021-06-24 RX ADMIN — MORPHINE SULFATE 2 MG: 2 INJECTION, SOLUTION INTRAMUSCULAR; INTRAVENOUS at 18:37

## 2021-06-24 RX ADMIN — KETOROLAC TROMETHAMINE 15 MG: 30 INJECTION, SOLUTION INTRAMUSCULAR; INTRAVENOUS at 17:03

## 2021-06-24 NOTE — ED NOTES
I have reviewed written and verbal discharge instructions with the patient. The patient verbalized understanding. Pt reported not being able to take the prescribed Naproxsyn. Dr. Diana Mims made aware and gave verbal instructions for Tylenol qh for the pt. Pt ambulated out of ER without difficulty.

## 2021-06-24 NOTE — LETTER
NOTIFICATION RETURN TO WORK / SCHOOL    6/24/2021 7:31 PM    Ms. Martin [address],[place] [id number]      To Whom It May Concern:    Aleena Rushing is currently under the care of [facility] EMERGENCY DEPT. She will return to work/school on: 6/28/21    Luann COTO Marybeth Velazco may return to work/school with the following restrictions:  None      If there are questions or concerns please have the patient contact our office.         Sincerely,      Sathish Kim RN

## 2021-06-24 NOTE — DISCHARGE INSTRUCTIONS
It was a pleasure taking care of you in our Emergency Department today. We know that when you come to [facility] you are entrusting us with your health, comfort, and safety. Our physicians and nurses honor that trust, and truly appreciate the opportunity to care for you and your loved ones. We also value your feedback. If you receive a survey about your Emergency Department experience today, please fill it out. We care about our patients' feedback, and we listen to what you have to say. Please read over your discharge instructions as these contain pertinent information to help you in the healing process. These instructions include a list of prescriptions you were given today. Follow-up information is also noted on your discharge papers. There are attached instructions and information pertaining to the reason why you were seen in the emergency department today. These discharge instructions may not be for exactly why you were here, but may be the closest available instructions that we have. These include important advice for things that you can do at home to feel better, and reasons to return to the emergency department. The evaluation and treatment you received in the emergency department is not always definitive care. If follow-up with your primary care doctor or specialist was recommended, it is important that you make these appointments for follow-up care. You may need further testing, procedures, and/or medications to help you feel better. Further tests may be required that are not available in the emergency department. Failure to make these follow-up appointments may jeopardize your health. The emergency department is here for emergent stabilization and evaluation of life and limb threatening illness and/or injuries.   Further care through a specialist or primary care doctor may be required to assist in your healing and complete your treatment and/or evaluation. We may not always be able to make a diagnosis in the emergency department, or things may change that will alter your diagnosis. Our primary goal is to ensure that nothing serious is occurring and that you are stable to continue your treatment and evaluation at home as an outpatient. Of course, if things change, and you feel worse, you are always encouraged to return to the emergency department for re-evaluation. Lab Results Today:  Recent Results (from the past 8 hour(s))   CBC WITH AUTOMATED DIFF    Collection Time: 06/24/21  2:59 PM   Result Value Ref Range    WBC 13.6 (H) 3.6 - 11.0 K/uL    RBC 4.75 3.80 - 5.20 M/uL    HGB 12.8 11.5 - 16.0 g/dL    HCT 40.2 35.0 - 47.0 %    MCV 84.6 80.0 - 99.0 FL    MCH 26.9 26.0 - 34.0 PG    MCHC 31.8 30.0 - 36.5 g/dL    RDW 12.7 11.5 - 14.5 %    PLATELET 186 764 - 874 K/uL    MPV 10.0 8.9 - 12.9 FL    NRBC 0.0 0  WBC    ABSOLUTE NRBC 0.00 0.00 - 0.01 K/uL    NEUTROPHILS 73 32 - 75 %    LYMPHOCYTES 20 12 - 49 %    MONOCYTES 6 5 - 13 %    EOSINOPHILS 1 0 - 7 %    BASOPHILS 0 0 - 1 %    IMMATURE GRANULOCYTES 0 0.0 - 0.5 %    ABS. NEUTROPHILS 9.9 (H) 1.8 - 8.0 K/UL    ABS. LYMPHOCYTES 2.7 0.8 - 3.5 K/UL    ABS. MONOCYTES 0.8 0.0 - 1.0 K/UL    ABS. EOSINOPHILS 0.1 0.0 - 0.4 K/UL    ABS. BASOPHILS 0.1 0.0 - 0.1 K/UL    ABS. IMM. GRANS. 0.0 0.00 - 0.04 K/UL    DF AUTOMATED     METABOLIC PANEL, COMPREHENSIVE    Collection Time: 06/24/21  2:59 PM   Result Value Ref Range    Sodium 137 136 - 145 mmol/L    Potassium 4.0 3.5 - 5.1 mmol/L    Chloride 107 97 - 108 mmol/L    CO2 27 21 - 32 mmol/L    Anion gap 3 (L) 5 - 15 mmol/L    Glucose 95 65 - 100 mg/dL    BUN 7 6 - 20 MG/DL    Creatinine 0.86 0.55 - 1.02 MG/DL    BUN/Creatinine ratio 8 (L) 12 - 20      GFR est AA >60 >60 ml/min/1.73m2    GFR est non-AA >60 >60 ml/min/1.73m2    Calcium 8.9 8.5 - 10.1 MG/DL    Bilirubin, total 0.7 0.2 - 1.0 MG/DL    ALT (SGPT) 24 12 - 78 U/L    AST (SGOT) 14 (L) 15 - 37 U/L    Alk. phosphatase 78 45 - 117 U/L    Protein, total 7.8 6.4 - 8.2 g/dL    Albumin 3.7 3.5 - 5.0 g/dL    Globulin 4.1 (H) 2.0 - 4.0 g/dL    A-G Ratio 0.9 (L) 1.1 - 2.2     BETA HCG, QT    Collection Time: 06/24/21  2:59 PM   Result Value Ref Range    Beta HCG, QT <1 0 - 6 MIU/ML   URINALYSIS W/ REFLEX CULTURE    Collection Time: 06/24/21  3:23 PM    Specimen: Miscellaneous sample; Urine    Urine specimen   Result Value Ref Range    Color LEISA      Appearance CLOUDY (A) CLEAR      Specific gravity 1.009 1.003 - 1.030      pH (UA) 5.0 5.0 - 8.0      Protein Negative NEG mg/dL    Glucose Negative NEG mg/dL    Ketone TRACE (A) NEG mg/dL    Blood Negative NEG      Urobilinogen 2.0 (H) 0.2 - 1.0 EU/dL    Nitrites Positive (A) NEG      Leukocyte Esterase LARGE (A) NEG      WBC 20-50 0 - 4 /hpf    RBC 0-5 0 - 5 /hpf    Epithelial cells FEW FEW /lpf    Bacteria 1+ (A) NEG /hpf    UA:UC IF INDICATED URINE CULTURE ORDERED (A) CNI      Granular cast 0-2 (A) NEG /lpf   BILIRUBIN, CONFIRM    Collection Time: 06/24/21  3:23 PM   Result Value Ref Range    Bilirubin UA, confirm Negative NEG          Radiology Results Today:  US TRANSVAGINAL    Result Date: 6/24/2021  Negative pelvic and transvaginal ultrasound. US PELV NON OBS    Result Date: 6/24/2021  Negative pelvic and transvaginal ultrasound.

## 2021-06-25 LAB
BACTERIA SPEC CULT: NORMAL
SERVICE CMNT-IMP: NORMAL

## 2021-12-20 ENCOUNTER — HOSPITAL ENCOUNTER (EMERGENCY)
Age: 31
Discharge: HOME | End: 2021-12-20
Attending: EMERGENCY MEDICINE
Payer: MEDICAID

## 2021-12-20 ENCOUNTER — APPOINTMENT (OUTPATIENT)
Dept: CT IMAGING | Age: 31
End: 2021-12-20
Attending: PHYSICIAN ASSISTANT
Payer: MEDICAID

## 2021-12-20 VITALS
OXYGEN SATURATION: 100 % | TEMPERATURE: 98.1 F | SYSTOLIC BLOOD PRESSURE: 135 MMHG | BODY MASS INDEX: 35.78 KG/M2 | WEIGHT: 236.11 LBS | RESPIRATION RATE: 20 BRPM | HEIGHT: 68 IN | HEART RATE: 85 BPM | DIASTOLIC BLOOD PRESSURE: 87 MMHG

## 2021-12-20 DIAGNOSIS — N30.00 ACUTE CYSTITIS WITHOUT HEMATURIA: Primary | ICD-10-CM

## 2021-12-20 LAB
ALBUMIN SERPL-MCNC: 3.4 G/DL (ref 3.5–5)
ALBUMIN/GLOB SERPL: 0.9 {RATIO} (ref 1.1–2.2)
ALP SERPL-CCNC: 76 U/L (ref 45–117)
ALT SERPL-CCNC: 26 U/L (ref 12–78)
ANION GAP SERPL CALC-SCNC: 8 MMOL/L (ref 5–15)
APPEARANCE UR: ABNORMAL
AST SERPL-CCNC: 18 U/L (ref 15–37)
BACTERIA URNS QL MICRO: ABNORMAL /HPF
BASOPHILS # BLD: 0.1 K/UL (ref 0–0.1)
BASOPHILS NFR BLD: 1 % (ref 0–1)
BILIRUB SERPL-MCNC: 0.6 MG/DL (ref 0.2–1)
BILIRUB UR QL: NEGATIVE
BUN SERPL-MCNC: 14 MG/DL (ref 6–20)
BUN/CREAT SERPL: 15 (ref 12–20)
CALCIUM SERPL-MCNC: 8.8 MG/DL (ref 8.5–10.1)
CHLORIDE SERPL-SCNC: 108 MMOL/L (ref 97–108)
CO2 SERPL-SCNC: 24 MMOL/L (ref 21–32)
COLOR UR: ABNORMAL
COMMENT, HOLDF: NORMAL
CREAT SERPL-MCNC: 0.93 MG/DL (ref 0.55–1.02)
DIFFERENTIAL METHOD BLD: ABNORMAL
EOSINOPHIL # BLD: 0.1 K/UL (ref 0–0.4)
EOSINOPHIL NFR BLD: 1 % (ref 0–7)
EPITH CASTS URNS QL MICRO: ABNORMAL /LPF
ERYTHROCYTE [DISTWIDTH] IN BLOOD BY AUTOMATED COUNT: 12.6 % (ref 11.5–14.5)
GLOBULIN SER CALC-MCNC: 4 G/DL (ref 2–4)
GLUCOSE SERPL-MCNC: 96 MG/DL (ref 65–100)
GLUCOSE UR STRIP.AUTO-MCNC: NEGATIVE MG/DL
HCG SERPL QL: NEGATIVE
HCT VFR BLD AUTO: 37.8 % (ref 35–47)
HGB BLD-MCNC: 12.5 G/DL (ref 11.5–16)
HGB UR QL STRIP: NEGATIVE
IMM GRANULOCYTES # BLD AUTO: 0.1 K/UL (ref 0–0.04)
IMM GRANULOCYTES NFR BLD AUTO: 1 % (ref 0–0.5)
KETONES UR QL STRIP.AUTO: ABNORMAL MG/DL
LEUKOCYTE ESTERASE UR QL STRIP.AUTO: ABNORMAL
LIPASE SERPL-CCNC: 70 U/L (ref 73–393)
LYMPHOCYTES # BLD: 2.5 K/UL (ref 0.8–3.5)
LYMPHOCYTES NFR BLD: 23 % (ref 12–49)
MCH RBC QN AUTO: 27.4 PG (ref 26–34)
MCHC RBC AUTO-ENTMCNC: 33.1 G/DL (ref 30–36.5)
MCV RBC AUTO: 82.7 FL (ref 80–99)
MONOCYTES # BLD: 0.8 K/UL (ref 0–1)
MONOCYTES NFR BLD: 7 % (ref 5–13)
NEUTS SEG # BLD: 7.4 K/UL (ref 1.8–8)
NEUTS SEG NFR BLD: 67 % (ref 32–75)
NITRITE UR QL STRIP.AUTO: NEGATIVE
NRBC # BLD: 0 K/UL (ref 0–0.01)
NRBC BLD-RTO: 0 PER 100 WBC
PH UR STRIP: 6 [PH] (ref 5–8)
PLATELET # BLD AUTO: 301 K/UL (ref 150–400)
PMV BLD AUTO: 9.4 FL (ref 8.9–12.9)
POTASSIUM SERPL-SCNC: 3.6 MMOL/L (ref 3.5–5.1)
PROT SERPL-MCNC: 7.4 G/DL (ref 6.4–8.2)
PROT UR STRIP-MCNC: ABNORMAL MG/DL
RBC # BLD AUTO: 4.57 M/UL (ref 3.8–5.2)
RBC #/AREA URNS HPF: ABNORMAL /HPF (ref 0–5)
SAMPLES BEING HELD,HOLD: NORMAL
SODIUM SERPL-SCNC: 140 MMOL/L (ref 136–145)
UA: UC IF INDICATED,UAUC: ABNORMAL
UROBILINOGEN UR QL STRIP.AUTO: 1 EU/DL (ref 0.2–1)
WBC # BLD AUTO: 10.9 K/UL (ref 3.6–11)
WBC URNS QL MICRO: ABNORMAL /HPF (ref 0–4)

## 2021-12-20 PROCEDURE — 80053 COMPREHEN METABOLIC PANEL: CPT

## 2021-12-20 PROCEDURE — 83690 ASSAY OF LIPASE: CPT

## 2021-12-20 PROCEDURE — 36415 COLL VENOUS BLD VENIPUNCTURE: CPT

## 2021-12-20 PROCEDURE — 87086 URINE CULTURE/COLONY COUNT: CPT

## 2021-12-20 PROCEDURE — 96375 TX/PRO/DX INJ NEW DRUG ADDON: CPT

## 2021-12-20 PROCEDURE — 81001 URINALYSIS AUTO W/SCOPE: CPT

## 2021-12-20 PROCEDURE — 96374 THER/PROPH/DIAG INJ IV PUSH: CPT

## 2021-12-20 PROCEDURE — 74177 CT ABD & PELVIS W/CONTRAST: CPT

## 2021-12-20 PROCEDURE — 84703 CHORIONIC GONADOTROPIN ASSAY: CPT

## 2021-12-20 PROCEDURE — 74011250636 HC RX REV CODE- 250/636: Performed by: PHYSICIAN ASSISTANT

## 2021-12-20 PROCEDURE — 74011000636 HC RX REV CODE- 636: Performed by: EMERGENCY MEDICINE

## 2021-12-20 PROCEDURE — 85025 COMPLETE CBC W/AUTO DIFF WBC: CPT

## 2021-12-20 PROCEDURE — 99284 EMERGENCY DEPT VISIT MOD MDM: CPT

## 2021-12-20 RX ORDER — SODIUM CHLORIDE 9 MG/ML
1000 INJECTION, SOLUTION INTRAVENOUS ONCE
Status: COMPLETED | OUTPATIENT
Start: 2021-12-20 | End: 2021-12-20

## 2021-12-20 RX ORDER — KETOROLAC TROMETHAMINE 30 MG/ML
15 INJECTION, SOLUTION INTRAMUSCULAR; INTRAVENOUS
Status: COMPLETED | OUTPATIENT
Start: 2021-12-20 | End: 2021-12-20

## 2021-12-20 RX ORDER — CEPHALEXIN 500 MG/1
500 CAPSULE ORAL 2 TIMES DAILY
Qty: 14 CAPSULE | Refills: 0 | Status: SHIPPED | OUTPATIENT
Start: 2021-12-20 | End: 2021-12-27

## 2021-12-20 RX ORDER — FENTANYL CITRATE 50 UG/ML
50 INJECTION, SOLUTION INTRAMUSCULAR; INTRAVENOUS
Status: COMPLETED | OUTPATIENT
Start: 2021-12-20 | End: 2021-12-20

## 2021-12-20 RX ORDER — NAPROXEN 500 MG/1
500 TABLET ORAL 2 TIMES DAILY WITH MEALS
Qty: 20 TABLET | Refills: 0 | Status: SHIPPED | OUTPATIENT
Start: 2021-12-20 | End: 2021-12-30

## 2021-12-20 RX ADMIN — KETOROLAC TROMETHAMINE 15 MG: 30 INJECTION, SOLUTION INTRAMUSCULAR; INTRAVENOUS at 10:28

## 2021-12-20 RX ADMIN — IOPAMIDOL 100 ML: 755 INJECTION, SOLUTION INTRAVENOUS at 09:34

## 2021-12-20 RX ADMIN — FENTANYL CITRATE 50 MCG: 50 INJECTION, SOLUTION INTRAMUSCULAR; INTRAVENOUS at 08:59

## 2021-12-20 RX ADMIN — SODIUM CHLORIDE 1000 ML: 9 INJECTION, SOLUTION INTRAVENOUS at 08:59

## 2021-12-20 NOTE — PROGRESS NOTES
Patient arrived to the ER ambulatory complaining of 9/10 abdominal pain and frequent urination. Her last bowel movement was one week ago. She is concerned she may be pregnant because she is 3 days late on her menstrual cycle.

## 2021-12-20 NOTE — Clinical Note
[address]  [facility] EMERGENCY DEPT  [address] [place] [id number]  [phone]    Work/School Note    Date: 12/20/2021    To Whom It May concern:    Lisha Kuhn was seen and treated today in the emergency room by the following provider(s):  Attending Provider: Evelyn Walker MD  Physician Assistant: LISA You. Lisha Kuhn is excused from work/school on 12/20/21 and 12/21/21. She is medically clear to return to work/school on 12/22/2021.        Sincerely,          LISA Magdaleno

## 2021-12-20 NOTE — ED PROVIDER NOTES
EMERGENCY DEPARTMENT HISTORY AND PHYSICAL EXAM      Date: 12/20/2021  Patient Name: Kimberli Lang    History of Presenting Illness     Chief Complaint   Patient presents with    Abdominal Pain     pt presents w/ c/o lower abdominal pain x3 days, pt also reports her cycle is late        History Provided By: Patient    HPI: Kimberli Lang, 36 y.o. female with no significant past medical history, presents to the ED with cc of abdominal pain for 3 days. She describes as a cramping pain across her lower abdomen, worse on the right side. The pain radiates into her bilateral anterior thighs. Pain is gradually worsened and is now severe. She has not tried any over-the-counter analgesics. She initially thought the cramping was being caused by her menstrual cycle beginning but it's never started and she is now 3 days late for her menstrual cycle. She has associated urinary frequency. She denies fever, nausea, vomiting, vaginal discharge, dysuria, diarrhea. There are no other complaints, changes, or physical findings at this time. PCP: None    No current facility-administered medications on file prior to encounter. Current Outpatient Medications on File Prior to Encounter   Medication Sig Dispense Refill    naproxen (NAPROSYN) 500 mg tablet Take 1 Tablet by mouth every twelve (12) hours as needed for Pain. 20 Tablet 0    ondansetron (ZOFRAN ODT) 4 mg disintegrating tablet Take 1 Tab by mouth every eight (8) hours as needed for Nausea or Vomiting.  20 Tab 0       Past History     Past Medical History:  Past Medical History:   Diagnosis Date    Anemia     Costochondritis        Past Surgical History:  Past Surgical History:   Procedure Laterality Date    HX ORTHOPAEDIC      Right fourth finger, \"Digit widgit\"       Family History:  Family History   Family history unknown: Yes       Social History:  Social History     Tobacco Use    Smoking status: Former Smoker    Smokeless tobacco: Never Used    Tobacco comment: \"only when I drink\"   Substance Use Topics    Alcohol use: No     Comment: Occasionally    Drug use: No       Allergies: Allergies   Allergen Reactions    Motrin [Ibuprofen] Hives         Review of Systems   Review of Systems   Constitutional: Negative for chills and fever. HENT: Negative for ear pain and sore throat. Eyes: Negative for redness and visual disturbance. Respiratory: Negative for cough and shortness of breath. Cardiovascular: Negative for chest pain and palpitations. Gastrointestinal: Positive for abdominal pain. Negative for nausea and vomiting. Genitourinary: Positive for frequency. Negative for dysuria, hematuria, vaginal bleeding, vaginal discharge and vaginal pain. Musculoskeletal: Negative for back pain and gait problem. Skin: Negative for rash and wound. Neurological: Negative for dizziness and headaches. Psychiatric/Behavioral: Negative for behavioral problems and confusion. All other systems reviewed and are negative. Physical Exam   Physical Exam  Constitutional:       Appearance: She is not toxic-appearing. HENT:      Head: Normocephalic and atraumatic. Mouth/Throat:      Mouth: Mucous membranes are moist.   Eyes:      Extraocular Movements: Extraocular movements intact. Pupils: Pupils are equal, round, and reactive to light. Cardiovascular:      Rate and Rhythm: Normal rate and regular rhythm. Pulmonary:      Effort: Pulmonary effort is normal. No respiratory distress. Abdominal:      Comments: Abdomen is soft. She is tender across the lower abdomen, worse in the right lower quadrant. No rebound or guarding. Musculoskeletal:         General: No deformity. Normal range of motion. Cervical back: Normal range of motion and neck supple. Skin:     General: Skin is warm and dry. Neurological:      General: No focal deficit present. Mental Status: She is alert and oriented to person, place, and time. Psychiatric:         Behavior: Behavior normal.           Diagnostic Study Results     Labs -     No results found for this or any previous visit (from the past 12 hour(s)). Radiologic Studies -   CT ABD PELV W CONT   Final Result   No acute abnormality. Normal appendix. CT Results  (Last 48 hours)               12/20/21 1008  CT ABD PELV W CONT Final result    Impression:  No acute abnormality. Normal appendix. Narrative:  EXAM: CT ABD PELV W CONT       INDICATION: RLQ abdominal pain, eval for appendicitis, possible ovarian cyst       COMPARISON: 6/22/2021        CONTRAST: 100 mL of Isovue-370. TECHNIQUE:    Following the uneventful intravenous administration of contrast, thin axial   images were obtained through the abdomen and pelvis. Coronal and sagittal   reconstructions were generated. Oral contrast was not administered. CT dose   reduction was achieved through use of a standardized protocol tailored for this   examination and automatic exposure control for dose modulation. FINDINGS:    LOWER THORAX: No significant abnormality in the incidentally imaged lower chest.   LIVER: No mass. BILIARY TREE: Gallbladder is within normal limits. CBD is not dilated. SPLEEN: within normal limits. PANCREAS: No mass or ductal dilatation. ADRENALS: Unremarkable. KIDNEYS: No mass, calculus, or hydronephrosis. STOMACH: Unremarkable. SMALL BOWEL: No dilatation or wall thickening. COLON: No dilatation or wall thickening. APPENDIX: Within normal limits. PERITONEUM: No ascites or pneumoperitoneum. RETROPERITONEUM: No lymphadenopathy or aortic aneurysm. REPRODUCTIVE ORGANS: There is no pelvic mass or lymphadenopathy. Adnexal   structures are unremarkable. URINARY BLADDER: Decompressed. BONES: No destructive bone lesion. ABDOMINAL WALL: No mass or hernia.    ADDITIONAL COMMENTS: N/A               CXR Results  (Last 48 hours)    None            Medical Decision Making   I am the first provider for this patient. I reviewed the vital signs, available nursing notes, past medical history, past surgical history, family history and social history. Vital Signs-Reviewed the patient's vital signs. Patient Vitals for the past 12 hrs:   Temp Pulse Resp BP SpO2   12/20/21 1030 98.1 °F (36.7 °C) 85 20 135/87 100 %         Records Reviewed: Nursing Notes and Old Medical Records      Provider Notes (Medical Decision Making):   DDx: Appendicitis, urinary tract infection, ectopic pregnancy, ovarian cyst, ovarian torsion    Patient presents with abdominal pain. On exam, she is tender in the RLQ without peritoneal signs. She is otherwise well appearing. Plan to check labs and CT abdomen/pelvis for further evaluation. ED Course:   Initial assessment performed. The patients presenting problems have been discussed, and they are in agreement with the care plan formulated and outlined with them. I have encouraged them to ask questions as they arise throughout their visit. ED Course as of 12/20/21 2111   Mon Dec 20, 2021   1104 Reassessed patient and her pain has resolved. Discussed results with her. Plan to start on antibiotic for UTI. Advised follow up with Ob/gyn for a recheck. Discussed return precautions. [TIRNI]      ED Course User Index  [TRINI] Joana Dandy, [address]         Disposition:  11:06 AM  The patient has been re-evaluated and is ready for discharge. Reviewed available results with patient. Counseled patient on diagnosis and care plan. Patient has expressed understanding, and all questions have been answered. Patient agrees with plan and agrees to follow up as recommended, or to return to the ED if their symptoms worsen. Discharge instructions have been provided and explained to the patient, along with reasons to return to the ED. PLAN:  1.    Discharge Medication List as of 12/20/2021 11:06 AM      START taking these medications    Details   cephALEXin (Keflex) 500 mg capsule Take 1 Capsule by mouth two (2) times a day for 7 days. , Normal, Disp-14 Capsule, R-0      !! naproxen (Naprosyn) 500 mg tablet Take 1 Tablet by mouth two (2) times daily (with meals) for 10 days. , Normal, Disp-20 Tablet, R-0       !! - Potential duplicate medications found. Please discuss with provider. CONTINUE these medications which have NOT CHANGED    Details   !! naproxen (NAPROSYN) 500 mg tablet Take 1 Tablet by mouth every twelve (12) hours as needed for Pain., Normal, Disp-20 Tablet, R-0      ondansetron (ZOFRAN ODT) 4 mg disintegrating tablet Take 1 Tab by mouth every eight (8) hours as needed for Nausea or Vomiting., Normal, Disp-20 Tab, R-0       !! - Potential duplicate medications found. Please discuss with provider. 2.   Follow-up Information     Follow up With Specialties Details Why Contact Info    Your OB/gyn  Call  to schedule a recheck     [facility] EMERGENCY DEPT Emergency Medicine Go to  If symptoms worsen [address] [place] [address]  [phone]        Return to ED if worse     Diagnosis     Clinical Impression:   1. Acute cystitis without hematuria            Jose Alejandro Console.  TYRESE Acosta

## 2021-12-21 LAB
BACTERIA SPEC CULT: NORMAL
CC UR VC: NORMAL
SERVICE CMNT-IMP: NORMAL

## 2021-12-22 ENCOUNTER — APPOINTMENT (OUTPATIENT)
Dept: ULTRASOUND IMAGING | Age: 31
End: 2021-12-22
Attending: EMERGENCY MEDICINE
Payer: MEDICAID

## 2021-12-22 ENCOUNTER — HOSPITAL ENCOUNTER (EMERGENCY)
Age: 31
Discharge: HOME | End: 2021-12-22
Attending: EMERGENCY MEDICINE
Payer: MEDICAID

## 2021-12-22 ENCOUNTER — APPOINTMENT (OUTPATIENT)
Dept: CT IMAGING | Age: 31
End: 2021-12-22
Attending: EMERGENCY MEDICINE
Payer: MEDICAID

## 2021-12-22 VITALS
WEIGHT: 239.2 LBS | BODY MASS INDEX: 36.25 KG/M2 | DIASTOLIC BLOOD PRESSURE: 83 MMHG | HEIGHT: 68 IN | SYSTOLIC BLOOD PRESSURE: 130 MMHG | RESPIRATION RATE: 16 BRPM | HEART RATE: 92 BPM | OXYGEN SATURATION: 100 % | TEMPERATURE: 97.8 F

## 2021-12-22 DIAGNOSIS — B37.31 YEAST VAGINITIS: ICD-10-CM

## 2021-12-22 DIAGNOSIS — N83.201 CYST OF RIGHT OVARY: Primary | ICD-10-CM

## 2021-12-22 LAB
ALBUMIN SERPL-MCNC: 3.4 G/DL (ref 3.5–5)
ALBUMIN/GLOB SERPL: 0.9 {RATIO} (ref 1.1–2.2)
ALP SERPL-CCNC: 77 U/L (ref 45–117)
ALT SERPL-CCNC: 21 U/L (ref 12–78)
ANION GAP SERPL CALC-SCNC: 4 MMOL/L (ref 5–15)
APPEARANCE UR: CLEAR
AST SERPL-CCNC: 14 U/L (ref 15–37)
BACTERIA URNS QL MICRO: NEGATIVE /HPF
BASOPHILS # BLD: 0 K/UL (ref 0–0.1)
BASOPHILS NFR BLD: 0 % (ref 0–1)
BILIRUB SERPL-MCNC: 0.5 MG/DL (ref 0.2–1)
BILIRUB UR QL: NEGATIVE
BUN SERPL-MCNC: 6 MG/DL (ref 6–20)
BUN/CREAT SERPL: 7 (ref 12–20)
CALCIUM SERPL-MCNC: 8.7 MG/DL (ref 8.5–10.1)
CHLORIDE SERPL-SCNC: 108 MMOL/L (ref 97–108)
CO2 SERPL-SCNC: 27 MMOL/L (ref 21–32)
COLOR UR: ABNORMAL
CREAT SERPL-MCNC: 0.81 MG/DL (ref 0.55–1.02)
DIFFERENTIAL METHOD BLD: ABNORMAL
EOSINOPHIL # BLD: 0.1 K/UL (ref 0–0.4)
EOSINOPHIL NFR BLD: 1 % (ref 0–7)
EPITH CASTS URNS QL MICRO: ABNORMAL /LPF
ERYTHROCYTE [DISTWIDTH] IN BLOOD BY AUTOMATED COUNT: 12.9 % (ref 11.5–14.5)
GLOBULIN SER CALC-MCNC: 4 G/DL (ref 2–4)
GLUCOSE SERPL-MCNC: 102 MG/DL (ref 65–100)
GLUCOSE UR STRIP.AUTO-MCNC: NEGATIVE MG/DL
HCT VFR BLD AUTO: 38.9 % (ref 35–47)
HGB BLD-MCNC: 12.5 G/DL (ref 11.5–16)
HGB UR QL STRIP: NEGATIVE
IMM GRANULOCYTES # BLD AUTO: 0.1 K/UL (ref 0–0.04)
IMM GRANULOCYTES NFR BLD AUTO: 0 % (ref 0–0.5)
KETONES UR QL STRIP.AUTO: NEGATIVE MG/DL
LEUKOCYTE ESTERASE UR QL STRIP.AUTO: ABNORMAL
LIPASE SERPL-CCNC: 64 U/L (ref 73–393)
LYMPHOCYTES # BLD: 2.1 K/UL (ref 0.8–3.5)
LYMPHOCYTES NFR BLD: 15 % (ref 12–49)
MCH RBC QN AUTO: 27 PG (ref 26–34)
MCHC RBC AUTO-ENTMCNC: 32.1 G/DL (ref 30–36.5)
MCV RBC AUTO: 84 FL (ref 80–99)
MONOCYTES # BLD: 0.9 K/UL (ref 0–1)
MONOCYTES NFR BLD: 7 % (ref 5–13)
MUCOUS THREADS URNS QL MICRO: ABNORMAL /LPF
NEUTS SEG # BLD: 11 K/UL (ref 1.8–8)
NEUTS SEG NFR BLD: 77 % (ref 32–75)
NITRITE UR QL STRIP.AUTO: NEGATIVE
NRBC # BLD: 0 K/UL (ref 0–0.01)
NRBC BLD-RTO: 0 PER 100 WBC
PH UR STRIP: 6.5 [PH] (ref 5–8)
PLATELET # BLD AUTO: 310 K/UL (ref 150–400)
PMV BLD AUTO: 9.5 FL (ref 8.9–12.9)
POTASSIUM SERPL-SCNC: 3.9 MMOL/L (ref 3.5–5.1)
PROT SERPL-MCNC: 7.4 G/DL (ref 6.4–8.2)
PROT UR STRIP-MCNC: NEGATIVE MG/DL
RBC # BLD AUTO: 4.63 M/UL (ref 3.8–5.2)
RBC #/AREA URNS HPF: ABNORMAL /HPF (ref 0–5)
SODIUM SERPL-SCNC: 139 MMOL/L (ref 136–145)
SP GR UR REFRACTOMETRY: 1.03 (ref 1–1.03)
UA: UC IF INDICATED,UAUC: ABNORMAL
UROBILINOGEN UR QL STRIP.AUTO: 1 EU/DL (ref 0.2–1)
WBC # BLD AUTO: 14.2 K/UL (ref 3.6–11)
WBC URNS QL MICRO: ABNORMAL /HPF (ref 0–4)
YEAST BUDDING URNS QL: PRESENT

## 2021-12-22 PROCEDURE — 74011000636 HC RX REV CODE- 636: Performed by: EMERGENCY MEDICINE

## 2021-12-22 PROCEDURE — 74011250636 HC RX REV CODE- 250/636: Performed by: EMERGENCY MEDICINE

## 2021-12-22 PROCEDURE — 99284 EMERGENCY DEPT VISIT MOD MDM: CPT

## 2021-12-22 PROCEDURE — 96375 TX/PRO/DX INJ NEW DRUG ADDON: CPT

## 2021-12-22 PROCEDURE — 74177 CT ABD & PELVIS W/CONTRAST: CPT

## 2021-12-22 PROCEDURE — 81001 URINALYSIS AUTO W/SCOPE: CPT

## 2021-12-22 PROCEDURE — 76830 TRANSVAGINAL US NON-OB: CPT

## 2021-12-22 PROCEDURE — 80053 COMPREHEN METABOLIC PANEL: CPT

## 2021-12-22 PROCEDURE — 96374 THER/PROPH/DIAG INJ IV PUSH: CPT

## 2021-12-22 PROCEDURE — 83690 ASSAY OF LIPASE: CPT

## 2021-12-22 PROCEDURE — 85025 COMPLETE CBC W/AUTO DIFF WBC: CPT

## 2021-12-22 PROCEDURE — 36415 COLL VENOUS BLD VENIPUNCTURE: CPT

## 2021-12-22 RX ORDER — MORPHINE SULFATE 2 MG/ML
4 INJECTION, SOLUTION INTRAMUSCULAR; INTRAVENOUS
Status: COMPLETED | OUTPATIENT
Start: 2021-12-22 | End: 2021-12-22

## 2021-12-22 RX ORDER — FLUCONAZOLE 150 MG/1
150 TABLET ORAL DAILY
Qty: 1 TABLET | Refills: 0 | Status: SHIPPED | OUTPATIENT
Start: 2021-12-22 | End: 2021-12-23

## 2021-12-22 RX ORDER — ONDANSETRON 2 MG/ML
4 INJECTION INTRAMUSCULAR; INTRAVENOUS
Status: COMPLETED | OUTPATIENT
Start: 2021-12-22 | End: 2021-12-22

## 2021-12-22 RX ADMIN — IOPAMIDOL 100 ML: 755 INJECTION, SOLUTION INTRAVENOUS at 11:26

## 2021-12-22 RX ADMIN — ONDANSETRON 4 MG: 2 INJECTION INTRAMUSCULAR; INTRAVENOUS at 12:01

## 2021-12-22 RX ADMIN — SODIUM CHLORIDE 1000 ML: 9 INJECTION, SOLUTION INTRAVENOUS at 12:00

## 2021-12-22 RX ADMIN — MORPHINE SULFATE 4 MG: 2 INJECTION, SOLUTION INTRAMUSCULAR; INTRAVENOUS at 12:01

## 2021-12-22 NOTE — LETTER
[facility] call to assist with scheduling. Patient answered and is unable to schedule at this time due to not having work schedule with them. Patient says that they will schedule an appointment via [employer] once they have their work schedule. [facility] will monitor for scheduling.    Josias Barron at 1:02 PM on 3/1/2021  [facility]  Phone [phone]   [address]  [facility] EMERGENCY DEPT  [address] [facility] [address]  [phone]    Work/School Note    Date: 12/22/2021    To Whom It May concern:    Delvin Montes was seen and treated today in the emergency room by the following provider(s):  Attending Provider: Irasema Vasquez MD.      Delvin Montes may return to work on 12/24/21.     Sincerely,          Amarilis Garcia RN

## 2021-12-24 NOTE — ED PROVIDER NOTES
EMERGENCY DEPARTMENT HISTORY AND PHYSICAL EXAM      Date: 12/22/2021  Patient Name: Naima Tamayo    History of Presenting Illness     Chief Complaint   Patient presents with    Vomiting     came here monday for uti; told to come back if worse but she is worse. she wasn't vomiting on monday. pain in lower right abdomen is worse. vomiting onset this morning    Abdominal Pain       History Provided By: Patient    HPI: Naima Tamayo, 36 y.o. female with PMHx as noted below presents the emergency department with chief complaint abdominal pain, nausea vomiting. Patient noted onset of symptoms Monday. She presented to the emergency department and was diagnosed with a UTI and discharged antibiotics. Patient reports that today the pain became more severe. She reports suprapubic and right lower quadrant pain that is sharp, stabbing and seems to wax and wane in intensity but is otherwise constant. The pain is nonradiating. She has associated nausea and vomiting. Denies vaginal discharge    Pt denies any other alleviating or exacerbating factors. Additionally, pt specifically denies any recent fever, chills, headache, CP, SOB, lightheadedness, dizziness, numbness, weakness, BLE swelling, heart palpitations, urinary sxs, diarrhea, constipation, melena, hematochezia, cough, or congestion. PCP: None    Current Outpatient Medications   Medication Sig Dispense Refill    fluconazole (DIFLUCAN) 150 mg tablet Take 1 Tablet by mouth daily for 1 day. FDA advises cautious prescribing of oral fluconazole in pregnancy. 1 Tablet 0    cephALEXin (Keflex) 500 mg capsule Take 1 Capsule by mouth two (2) times a day for 7 days. 14 Capsule 0    naproxen (Naprosyn) 500 mg tablet Take 1 Tablet by mouth two (2) times daily (with meals) for 10 days. 20 Tablet 0    naproxen (NAPROSYN) 500 mg tablet Take 1 Tablet by mouth every twelve (12) hours as needed for Pain.  20 Tablet 0    ondansetron (ZOFRAN ODT) 4 mg disintegrating tablet Take 1 Tab by mouth every eight (8) hours as needed for Nausea or Vomiting. 20 Tab 0       Past History     Past Medical History:  Past Medical History:   Diagnosis Date    Anemia     Costochondritis        Past Surgical History:  Past Surgical History:   Procedure Laterality Date    HX ORTHOPAEDIC      Right fourth finger, \"Digit widgit\"       Family History:  Family History   Family history unknown: Yes       Social History:  Social History     Tobacco Use    Smoking status: Former Smoker    Smokeless tobacco: Never Used    Tobacco comment: \"only when I drink\"   Substance Use Topics    Alcohol use: No     Comment: Occasionally    Drug use: No       Allergies: Allergies   Allergen Reactions    Nsaids (Non-Steroidal Anti-Inflammatory Drug) Hives    Motrin [Ibuprofen] Hives         Review of Systems   Review of Systems  Constitutional: Negative for fever, chills, and fatigue. HENT: Negative for congestion, sore throat, rhinorrhea, sneezing and neck stiffness   Eyes: Negative for discharge and redness. Respiratory: Negative for  shortness of breath, wheezing   Cardiovascular: Negative for chest pain, palpitations   Gastrointestinal: positive  for nausea, vomiting, abdominal pain. Negative constipation, diarrhea and blood in stool. Genitourinary: Negative for dysuria, hematuria, flank pain, decreased urine volume, discharge,   Musculoskeletal: Negative for myalgias or joint pain . Skin: Negative for rash or lesions . Neurological: Negative weakness, light-headedness, numbness and headaches. Physical Exam   Physical Exam    GENERAL: alert and oriented, no acute distress  EYES: PEERL, No injection, discharge or icterus. ENT: Mucous membranes pink and moist.  NECK: Supple  LUNGS: Airway patent. Non-labored respirations. Breath sounds clear with good air entry bilaterally. HEART: Regular rate and rhythm.  No peripheral edema  ABDOMEN: Non-distended, RLQ TTP, without guarding or rebound. SKIN:  warm, dry  MSK/EXTREMITIES: Without swelling, tenderness or deformity, symmetric with normal ROM  NEUROLOGICAL: Alert, oriented      Diagnostic Study Results     Labs -   reviewed and interpreted by me. Radiologic Studies -   US TRANSVAGINAL   Final Result   Right ovarian small simple cyst. No sonographic evidence of torsion. CT ABD PELV W CONT   Final Result      1. Normal appendix. 2. 3 cm right ovarian cyst with some vague internal enhancement, this might   reflect a hemorrhagic cyst.         CT Results  (Last 48 hours)               12/22/21 1125  CT ABD PELV W CONT Final result    Impression:      1. Normal appendix. 2. 3 cm right ovarian cyst with some vague internal enhancement, this might   reflect a hemorrhagic cyst.        Narrative:  EXAM: CT ABD PELV W CONT       INDICATION: RLQ TTP, worsening leukocytosis, recent negative CT however sx   worsening, early appy? COMPARISON: CT abdomen pelvis December 20, 2021        CONTRAST: 100 mL of Isovue-370. TECHNIQUE:    Following the uneventful intravenous administration of contrast, thin axial   images were obtained through the abdomen and pelvis. Coronal and sagittal   reconstructions were generated. Oral contrast was not administered. CT dose   reduction was achieved through use of a standardized protocol tailored for this   examination and automatic exposure control for dose modulation. FINDINGS:    LOWER THORAX: No significant abnormality in the incidentally imaged lower chest.   LIVER: No mass. BILIARY TREE: Gallbladder is within normal limits. CBD is not dilated. SPLEEN: within normal limits. PANCREAS: No mass or ductal dilatation. ADRENALS: Unremarkable. KIDNEYS: No mass, calculus, or hydronephrosis. STOMACH: Unremarkable. SMALL BOWEL: No dilatation or wall thickening. COLON: No dilatation or wall thickening. APPENDIX: Unremarkable. PERITONEUM: No ascites or pneumoperitoneum. RETROPERITONEUM: No lymphadenopathy or aortic aneurysm. REPRODUCTIVE ORGANS: 3 cm right ovarian cyst, with intermediate internal density   or mild enhancement. URINARY BLADDER: No mass or calculus. BONES: No destructive bone lesion. ABDOMINAL WALL: No mass or hernia. ADDITIONAL COMMENTS: N/A               CXR Results  (Last 48 hours)    None            Medical Decision Making     Barber COTO MD am the first provider for this patient and am the attending of record for this patient encounter. I reviewed the vital signs, available nursing notes, past medical history, past surgical history, family history and social history. Vital Signs-Reviewed the patient's vital signs. Records Reviewed: Nursing Notes and Old Medical Records    Provider Notes (Medical Decision Making): The patient presents with a chief complaint of abdominal pain. Differential diagnosis considered includes acute appendicitis, acute cholecystitis, pancreatitis, gastritis, PUD, diverticulitis, bowel obstruction, enteritis, colitis, fecal impaction, volvulus, IBS, inflammatory bowel disease, specific food intolerance, peritonitis, perforated viscous, malignancy, UTI, abscess, and abdominal pain NOS. Pelvic source of pain was also considered including dysmenorrhea, ovarian cyst, ovarian torsion, PID, TOA, cervicitis, vaginitis, or uterine fibroid. Basic labs obtained agnostic and reassuring with exception of mild leukocytosis. Did obtain imaging which showed a right ovarian cyst, possibly hemorrhagic clinical examination, history, and work-up exclude some of the more serious diagnoses as listed above. Cause of the abdominal pain was felt likely secondary to the ovarian cyst.  No signs of torsion on ultrasound. Patient is on pain medication and nausea medication, pt is tolerating po. The patient states that their symptoms have improved and he feels much better.  There are no other new complaints at this time      There were no concerns for any acute life-threatening or surgical pathology that would warrant admission at this time. Vital signs were within acceptable limits at the time of discharge. Patient was in stable condition and felt to be reliable for outpatient management. There were no lab findings of immediate concern. The patient was advised to return to the emergency room for acutely worsening pain, persistence of pain, fevers,  intractable vomiting , inability to tolerate food/liquids, syncope, or change in mental status. Otherwise, the patient is encouraged to follow-up with a primary care physician within the week if possible. .  The patient understood the work-up and assessment and had no further questions. The patient was discharged home in stable clinical condition. ED Course:   Initial assessment performed. The patients presenting problems have been discussed, and they are in agreement with the care plan formulated and outlined with them. I have encouraged them to ask questions as they arise throughout their visit. Medications   sodium chloride 0.9 % bolus infusion 1,000 mL (0 mL IntraVENous IV Completed 12/22/21 1538)   morphine injection 4 mg (4 mg IntraVENous Given 12/22/21 1201)   ondansetron (ZOFRAN) injection 4 mg (4 mg IntraVENous Given 12/22/21 1201)   iopamidoL (ISOVUE-370) 76 % injection 100 mL (100 mL IntraVENous Given 12/22/21 1126)         PROGRESS  Luann Childers's  results have been reviewed with her. She has been counseled regarding her diagnosis. She verbally conveys understanding and agreement of the signs, symptoms, diagnosis, treatment and prognosis and additionally agrees to follow up as recommended with her obgyn. She also agrees with the care-plan and conveys that all of her questions have been answered.   I have also put together some discharge instructions for her that include: 1) educational information regarding their diagnosis, 2) how to care for their diagnosis at home, as well a 3) list of reasons why they would want to return to the ED prior to their follow-up appointment, should their condition change. Disposition:  home    PLAN:  1. Discharge Medication List as of 12/22/2021  2:58 PM      START taking these medications    Details   fluconazole (DIFLUCAN) 150 mg tablet Take 1 Tablet by mouth daily for 1 day. FDA advises cautious prescribing of oral fluconazole in pregnancy. , Normal, Disp-1 Tablet, R-0         CONTINUE these medications which have NOT CHANGED    Details   cephALEXin (Keflex) 500 mg capsule Take 1 Capsule by mouth two (2) times a day for 7 days. , Normal, Disp-14 Capsule, R-0      !! naproxen (Naprosyn) 500 mg tablet Take 1 Tablet by mouth two (2) times daily (with meals) for 10 days. , Normal, Disp-20 Tablet, R-0      !! naproxen (NAPROSYN) 500 mg tablet Take 1 Tablet by mouth every twelve (12) hours as needed for Pain., Normal, Disp-20 Tablet, R-0      ondansetron (ZOFRAN ODT) 4 mg disintegrating tablet Take 1 Tab by mouth every eight (8) hours as needed for Nausea or Vomiting., Normal, Disp-20 Tab, R-0       !! - Potential duplicate medications found. Please discuss with provider. 2.   Follow-up Information     Follow up With Specialties Details Why Contact Info    [facility] EMERGENCY DEPT Emergency Medicine  If symptoms worsen [address] [facility]    Sierra Garza MD Obstetrics & Gynecology, Gynecology, Obstetrics Schedule an appointment as soon as possible for a visit in 2 days  [address]   [facility]  [phone]          Return to ED if worse     Diagnosis     Clinical Impression:   1. Cyst of right ovary    2. Yeast vaginitis        Please note that this dictation was completed with Dragon, computer voice recognition software.   Quite often unanticipated grammatical, syntax, homophones, and other interpretive errors are inadvertently transcribed by the computer software. Please disregard these errors. Additionally, please excuse any errors that have escaped final proofreading.

## 2022-01-05 ENCOUNTER — APPOINTMENT (OUTPATIENT)
Dept: ULTRASOUND IMAGING | Age: 32
End: 2022-01-05
Attending: EMERGENCY MEDICINE
Payer: MEDICAID

## 2022-01-05 ENCOUNTER — HOSPITAL ENCOUNTER (EMERGENCY)
Age: 32
Discharge: HOME | End: 2022-01-05
Attending: EMERGENCY MEDICINE
Payer: MEDICAID

## 2022-01-05 VITALS
HEIGHT: 68 IN | RESPIRATION RATE: 16 BRPM | TEMPERATURE: 99.2 F | DIASTOLIC BLOOD PRESSURE: 69 MMHG | HEART RATE: 84 BPM | WEIGHT: 231.48 LBS | OXYGEN SATURATION: 100 % | SYSTOLIC BLOOD PRESSURE: 120 MMHG | BODY MASS INDEX: 35.08 KG/M2

## 2022-01-05 DIAGNOSIS — R10.31 ABDOMINAL PAIN, RIGHT LOWER QUADRANT: Primary | ICD-10-CM

## 2022-01-05 LAB
ALBUMIN SERPL-MCNC: 3.5 G/DL (ref 3.5–5)
ALBUMIN/GLOB SERPL: 0.7 {RATIO} (ref 1.1–2.2)
ALP SERPL-CCNC: 81 U/L (ref 45–117)
ALT SERPL-CCNC: 23 U/L (ref 12–78)
AMORPH CRY URNS QL MICRO: ABNORMAL
ANION GAP SERPL CALC-SCNC: 10 MMOL/L (ref 5–15)
APPEARANCE UR: ABNORMAL
AST SERPL-CCNC: 21 U/L (ref 15–37)
BACTERIA URNS QL MICRO: NEGATIVE /HPF
BASOPHILS # BLD: 0.1 K/UL (ref 0–0.1)
BASOPHILS NFR BLD: 0 % (ref 0–1)
BILIRUB SERPL-MCNC: 0.7 MG/DL (ref 0.2–1)
BILIRUB UR QL CFM: NEGATIVE
BUN SERPL-MCNC: 7 MG/DL (ref 6–20)
BUN/CREAT SERPL: 8 (ref 12–20)
CALCIUM SERPL-MCNC: 9.1 MG/DL (ref 8.5–10.1)
CHLORIDE SERPL-SCNC: 103 MMOL/L (ref 97–108)
CO2 SERPL-SCNC: 26 MMOL/L (ref 21–32)
COLOR UR: ABNORMAL
CREAT SERPL-MCNC: 0.83 MG/DL (ref 0.55–1.02)
DIFFERENTIAL METHOD BLD: ABNORMAL
EOSINOPHIL # BLD: 0.1 K/UL (ref 0–0.4)
EOSINOPHIL NFR BLD: 0 % (ref 0–7)
EPITH CASTS URNS QL MICRO: ABNORMAL /LPF
ERYTHROCYTE [DISTWIDTH] IN BLOOD BY AUTOMATED COUNT: 12.3 % (ref 11.5–14.5)
GLOBULIN SER CALC-MCNC: 5 G/DL (ref 2–4)
GLUCOSE SERPL-MCNC: 94 MG/DL (ref 65–100)
GLUCOSE UR STRIP.AUTO-MCNC: NEGATIVE MG/DL
HCG UR QL: NEGATIVE
HCT VFR BLD AUTO: 39.4 % (ref 35–47)
HGB BLD-MCNC: 12.7 G/DL (ref 11.5–16)
HGB UR QL STRIP: NEGATIVE
IMM GRANULOCYTES # BLD AUTO: 0.1 K/UL (ref 0–0.04)
IMM GRANULOCYTES NFR BLD AUTO: 1 % (ref 0–0.5)
KETONES UR QL STRIP.AUTO: ABNORMAL MG/DL
LEUKOCYTE ESTERASE UR QL STRIP.AUTO: ABNORMAL
LIPASE SERPL-CCNC: 81 U/L (ref 73–393)
LYMPHOCYTES # BLD: 2.2 K/UL (ref 0.8–3.5)
LYMPHOCYTES NFR BLD: 17 % (ref 12–49)
MCH RBC QN AUTO: 26.7 PG (ref 26–34)
MCHC RBC AUTO-ENTMCNC: 32.2 G/DL (ref 30–36.5)
MCV RBC AUTO: 82.9 FL (ref 80–99)
MONOCYTES # BLD: 0.8 K/UL (ref 0–1)
MONOCYTES NFR BLD: 6 % (ref 5–13)
NEUTS SEG # BLD: 9.8 K/UL (ref 1.8–8)
NEUTS SEG NFR BLD: 76 % (ref 32–75)
NITRITE UR QL STRIP.AUTO: NEGATIVE
NRBC # BLD: 0 K/UL (ref 0–0.01)
NRBC BLD-RTO: 0 PER 100 WBC
PH UR STRIP: 5.5 [PH] (ref 5–8)
PLATELET # BLD AUTO: 430 K/UL (ref 150–400)
PMV BLD AUTO: 10 FL (ref 8.9–12.9)
POTASSIUM SERPL-SCNC: 4.2 MMOL/L (ref 3.5–5.1)
PROT SERPL-MCNC: 8.5 G/DL (ref 6.4–8.2)
PROT UR STRIP-MCNC: NEGATIVE MG/DL
RBC # BLD AUTO: 4.75 M/UL (ref 3.8–5.2)
RBC #/AREA URNS HPF: ABNORMAL /HPF (ref 0–5)
SODIUM SERPL-SCNC: 139 MMOL/L (ref 136–145)
SP GR UR REFRACTOMETRY: >1.03 (ref 1–1.03)
UR CULT HOLD, URHOLD: NORMAL
UROBILINOGEN UR QL STRIP.AUTO: 0.2 EU/DL (ref 0.2–1)
WBC # BLD AUTO: 12.9 K/UL (ref 3.6–11)
WBC URNS QL MICRO: ABNORMAL /HPF (ref 0–4)

## 2022-01-05 PROCEDURE — 99284 EMERGENCY DEPT VISIT MOD MDM: CPT

## 2022-01-05 PROCEDURE — 76856 US EXAM PELVIC COMPLETE: CPT

## 2022-01-05 PROCEDURE — 83690 ASSAY OF LIPASE: CPT

## 2022-01-05 PROCEDURE — 36415 COLL VENOUS BLD VENIPUNCTURE: CPT

## 2022-01-05 PROCEDURE — 74011250636 HC RX REV CODE- 250/636: Performed by: EMERGENCY MEDICINE

## 2022-01-05 PROCEDURE — 76830 TRANSVAGINAL US NON-OB: CPT

## 2022-01-05 PROCEDURE — 96374 THER/PROPH/DIAG INJ IV PUSH: CPT

## 2022-01-05 PROCEDURE — 81025 URINE PREGNANCY TEST: CPT

## 2022-01-05 PROCEDURE — 81001 URINALYSIS AUTO W/SCOPE: CPT

## 2022-01-05 PROCEDURE — 80053 COMPREHEN METABOLIC PANEL: CPT

## 2022-01-05 PROCEDURE — 85025 COMPLETE CBC W/AUTO DIFF WBC: CPT

## 2022-01-05 RX ORDER — ONDANSETRON 4 MG/1
4 TABLET, ORALLY DISINTEGRATING ORAL
Qty: 9 TABLET | Refills: 0 | Status: SHIPPED | OUTPATIENT
Start: 2022-01-05 | End: 2022-01-08

## 2022-01-05 RX ORDER — MORPHINE SULFATE 4 MG/ML
4 INJECTION INTRAVENOUS ONCE
Status: COMPLETED | OUTPATIENT
Start: 2022-01-05 | End: 2022-01-05

## 2022-01-05 RX ADMIN — MORPHINE SULFATE 4 MG: 4 INJECTION INTRAVENOUS at 16:26

## 2022-01-05 NOTE — DISCHARGE INSTRUCTIONS
To the emergency department if your symptoms are worsening, you have a fever, have persistent vomiting that does not improve with Zofran, or unable to eat or drink, or develop any other symptoms you find concerning. In the meantime please call your OB/GYN doctor to schedule a follow-up appointment. I have provided you with the number for your OB/GYN doctor. I have also sent your medications to the pharmacy.

## 2022-01-05 NOTE — ED PROVIDER NOTES
HPI   This is a 65-year-old female who presents to the emergency department due to nausea and abdominal pain. Her symptoms started today. She began feeling very nauseous and had several episodes of vomiting while at work. She later noted some lower abdominal pain. She said she had similar symptoms at the end of last month where she went to [facility] where she was diagnosed with an ovarian cyst.  She says her symptoms feel very similar. She has had no fever. She denies urinary symptoms. She denies abnormal vaginal bleeding or discharge. She says she has not been sexually active in some time is no concern for sexually transmitted infections. Patient called EMS and she was given some Zofran in route. Her nausea has resolved and she is having some mild residual pain.     Past Medical History:   Diagnosis Date    Anemia     Costochondritis        Past Surgical History:   Procedure Laterality Date    HX ORTHOPAEDIC      Right fourth finger, \"Digit widgit\"         Family History:   Family history unknown: Yes       Social History     Socioeconomic History    Marital status: SINGLE     Spouse name: Not on file    Number of children: Not on file    Years of education: Not on file    Highest education level: Not on file   Occupational History    Not on file   Tobacco Use    Smoking status: Former Smoker    Smokeless tobacco: Never Used    Tobacco comment: \"only when I drink\"   Substance and Sexual Activity    Alcohol use: No     Comment: Occasionally    Drug use: No    Sexual activity: Yes     Partners: Male     Birth control/protection: None   Other Topics Concern     Service Not Asked    Blood Transfusions Not Asked    Caffeine Concern Not Asked    Occupational Exposure Not Asked    Hobby Hazards Not Asked    Sleep Concern Not Asked    Stress Concern Not Asked    Weight Concern Not Asked    Special Diet Not Asked    Back Care Not Asked    Exercise Not Asked  Bike Helmet Not Asked    Seat Belt Not Asked    Self-Exams Not Asked   Social History Narrative    Not on file     Social Determinants of Health     Financial Resource Strain:     Difficulty of Paying Living Expenses: Not on file   Food Insecurity:     Worried About Running Out of Food in the Last Year: Not on file    Crissy of Food in the Last Year: Not on file   Transportation Needs:     Lack of Transportation (Medical): Not on file    Lack of Transportation (Non-Medical): Not on file   Physical Activity:     Days of Exercise per Week: Not on file    Minutes of Exercise per Session: Not on file   Stress:     Feeling of Stress : Not on file   Social Connections:     Frequency of Communication with Friends and Family: Not on file    Frequency of Social Gatherings with Friends and Family: Not on file    Attends Taoism Services: Not on file    Active Member of [address] [place] [address] or Organizations: Not on file    Attends Club or Organization Meetings: Not on file    Marital Status: Not on file   Intimate Partner Violence:     Fear of Current or Ex-Partner: Not on file    Emotionally Abused: Not on file    Physically Abused: Not on file    Sexually Abused: Not on file   Housing Stability:     Unable to Pay for Housing in the Last Year: Not on file    Number of [place] in the Last Year: Not on file    Unstable Housing in the Last Year: Not on file         ALLERGIES: Nsaids (non-steroidal anti-inflammatory drug) and Motrin [ibuprofen]    Review of Systems   A complete review of systems was performed and all systems reviewed were negative unless otherwise documented in the HPI.     Vitals:    01/05/22 1410 01/05/22 1427 01/05/22 1527   BP: (!) 127/98 (!) 134/91 125/69   Pulse: 91     Resp: 16     Temp: 99.2 °F (37.3 °C)     SpO2: 99% 99%    Weight: 105 kg (231 lb 7.7 oz)     Height: 5' 8\" (1.727 m)              Physical Exam  Constitutional:       Comments: Resting comfortably no acute distress Eyes:      General: No scleral icterus. Extraocular Movements: Extraocular movements intact. Neck:      Comments: Trachea midline  Cardiovascular:      Comments: Regular rate and rhythm without murmurs. 2+ radial pulses bilaterally  Pulmonary:      Effort: Pulmonary effort is normal. No respiratory distress. Breath sounds: Normal breath sounds. No wheezing or rales. Abdominal:      Comments: Soft. No distention. Patient endorses some mild tenderness just to the right of the bladder but has no rebound tenderness or guarding. Musculoskeletal:         General: No deformity. Normal range of motion. Cervical back: Normal range of motion. Skin:     General: Skin is warm and dry. Neurological:      Comments: Awake and alert. Speech is normal.  GCS 15          MDM   65-year-old female presents with the above chief complaint. Vital signs are stable. She has benign abdominal exam with next and no tenderness. Low suspicion for any surgical pathology such as appendicitis. This could be the result of her ovarian cyst.  Ultrasound will be ordered to evaluate for ruptured cyst or torsion. She be given pain medication. Labs and urinalysis will be ordered. Labs show a slight leukocytosis of 12.9. Urinalysis reassuring with no sign of infection or blood. Metabolic panel reassuring. Pelvic ultrasound shows slight enlargement of the right ovarian cyst compared to her most recent study in late December but a decrease in the amount of trace pelvic fluid. The cyst was less than 5 cm. She was examined multiple times and her pain had resolved. She had good flow to her ovary on the ultrasound. Patient does not seem to be experiencing ovarian torsion. She later told me that her menses has been late and she has had similar symptoms prior to starting her menses. Given her reassuring work-up and improvement in symptoms, feel the patient is safe to be discharged.   She has not followed up with her OB/GYN I think would be the appropriate practitioner to follow-up on the patient's symptoms. She was instructed to call her OB/GYN either today or tomorrow to get a follow-up appointment for soon as possible. Return precautions provided and the patient discharged in stable condition.     Procedures

## 2022-01-05 NOTE — ED TRIAGE NOTES
Patient presents to waiting area by EMS. Patient states she began vomiting last night and has continued throughout the day today. Also complains of lower abdominal pain. Denies diarrhea. Has had chills and sweats with vomiting. Patient to ED for chest pain that started 45 mns prior to arrival. Patient was on the toilet when chest pain started. Patient took ASA and a nitro and came to the ER.  Patient states pain went from 7/10 to 5/10 after taking the medications      Carlee Matthews RN  12/30/21 [id number]

## 2022-01-05 NOTE — ED NOTES
The patient was discharged home by Dr. Bárbara Grove  in stable condition. The patient is alert and oriented, in no respiratory distress and discharge vital signs obtained. The patient's diagnosis, condition and treatment were explained. The patient expressed understanding. One prescription e-scribed to pharmacy. No work/school note given. A discharge plan has been developed. A  was not involved in the process. Aftercare instructions were given. Pt ambulatory out of the ED.

## 2022-01-05 NOTE — LETTER
[address] EMERGENCY DEPT  [address] [facility] [id number] [phone]    Work/School Note    Date: 1/5/2022    To Whom It May concern:    Kassie Ramirez was seen and treated today in the emergency room by the following provider(s):  Attending Provider: Damien Almaguer MD.          Sincerely,          Brenda Jarrell RN

## 2022-05-24 ENCOUNTER — APPOINTMENT (OUTPATIENT)
Dept: GENERAL RADIOLOGY | Age: 32
End: 2022-05-24
Attending: EMERGENCY MEDICINE
Payer: MEDICAID

## 2022-05-24 ENCOUNTER — HOSPITAL ENCOUNTER (EMERGENCY)
Age: 32
Discharge: HOME | End: 2022-05-24
Attending: STUDENT IN AN ORGANIZED HEALTH CARE EDUCATION/TRAINING PROGRAM
Payer: MEDICAID

## 2022-05-24 VITALS
HEART RATE: 100 BPM | WEIGHT: 230.38 LBS | SYSTOLIC BLOOD PRESSURE: 123 MMHG | OXYGEN SATURATION: 96 % | HEIGHT: 68 IN | RESPIRATION RATE: 18 BRPM | TEMPERATURE: 98.9 F | BODY MASS INDEX: 34.92 KG/M2 | DIASTOLIC BLOOD PRESSURE: 90 MMHG

## 2022-05-24 DIAGNOSIS — J06.9 VIRAL UPPER RESPIRATORY TRACT INFECTION WITH COUGH: Primary | ICD-10-CM

## 2022-05-24 LAB
ALBUMIN SERPL-MCNC: 3.2 G/DL (ref 3.5–5)
ALBUMIN/GLOB SERPL: 0.8 {RATIO} (ref 1.1–2.2)
ALP SERPL-CCNC: 74 U/L (ref 45–117)
ALT SERPL-CCNC: 26 U/L (ref 12–78)
ANION GAP SERPL CALC-SCNC: 6 MMOL/L (ref 5–15)
AST SERPL-CCNC: 22 U/L (ref 15–37)
BASOPHILS # BLD: 0 K/UL (ref 0–0.1)
BASOPHILS NFR BLD: 0 % (ref 0–1)
BILIRUB SERPL-MCNC: 0.7 MG/DL (ref 0.2–1)
BUN SERPL-MCNC: 4 MG/DL (ref 6–20)
BUN/CREAT SERPL: 5 (ref 12–20)
CALCIUM SERPL-MCNC: 8.4 MG/DL (ref 8.5–10.1)
CHLORIDE SERPL-SCNC: 109 MMOL/L (ref 97–108)
CO2 SERPL-SCNC: 23 MMOL/L (ref 21–32)
CREAT SERPL-MCNC: 0.76 MG/DL (ref 0.55–1.02)
D DIMER PPP FEU-MCNC: 0.48 MG/L FEU (ref 0–0.65)
DIFFERENTIAL METHOD BLD: NORMAL
EOSINOPHIL # BLD: 0.1 K/UL (ref 0–0.4)
EOSINOPHIL NFR BLD: 1 % (ref 0–7)
ERYTHROCYTE [DISTWIDTH] IN BLOOD BY AUTOMATED COUNT: 13.9 % (ref 11.5–14.5)
GLOBULIN SER CALC-MCNC: 4 G/DL (ref 2–4)
GLUCOSE SERPL-MCNC: 110 MG/DL (ref 65–100)
HCT VFR BLD AUTO: 36.2 % (ref 35–47)
HGB BLD-MCNC: 11.5 G/DL (ref 11.5–16)
IMM GRANULOCYTES # BLD AUTO: 0 K/UL (ref 0–0.04)
IMM GRANULOCYTES NFR BLD AUTO: 0 % (ref 0–0.5)
LYMPHOCYTES # BLD: 3 K/UL (ref 0.8–3.5)
LYMPHOCYTES NFR BLD: 30 % (ref 12–49)
MCH RBC QN AUTO: 26 PG (ref 26–34)
MCHC RBC AUTO-ENTMCNC: 31.8 G/DL (ref 30–36.5)
MCV RBC AUTO: 81.9 FL (ref 80–99)
MONOCYTES # BLD: 0.9 K/UL (ref 0–1)
MONOCYTES NFR BLD: 9 % (ref 5–13)
NEUTS SEG # BLD: 6 K/UL (ref 1.8–8)
NEUTS SEG NFR BLD: 60 % (ref 32–75)
NRBC # BLD: 0 K/UL (ref 0–0.01)
NRBC BLD-RTO: 0 PER 100 WBC
PLATELET # BLD AUTO: 225 K/UL (ref 150–400)
PMV BLD AUTO: 9.4 FL (ref 8.9–12.9)
POTASSIUM SERPL-SCNC: 3.6 MMOL/L (ref 3.5–5.1)
PROT SERPL-MCNC: 7.2 G/DL (ref 6.4–8.2)
RBC # BLD AUTO: 4.42 M/UL (ref 3.8–5.2)
SARS-COV-2, COV2: NORMAL
SODIUM SERPL-SCNC: 138 MMOL/L (ref 136–145)
WBC # BLD AUTO: 10 K/UL (ref 3.6–11)

## 2022-05-24 PROCEDURE — 85025 COMPLETE CBC W/AUTO DIFF WBC: CPT

## 2022-05-24 PROCEDURE — 71045 X-RAY EXAM CHEST 1 VIEW: CPT

## 2022-05-24 PROCEDURE — 74011250637 HC RX REV CODE- 250/637: Performed by: PHYSICIAN ASSISTANT

## 2022-05-24 PROCEDURE — 85379 FIBRIN DEGRADATION QUANT: CPT

## 2022-05-24 PROCEDURE — 80053 COMPREHEN METABOLIC PANEL: CPT

## 2022-05-24 PROCEDURE — 36415 COLL VENOUS BLD VENIPUNCTURE: CPT

## 2022-05-24 PROCEDURE — U0005 INFEC AGEN DETEC AMPLI PROBE: HCPCS

## 2022-05-24 PROCEDURE — 99284 EMERGENCY DEPT VISIT MOD MDM: CPT

## 2022-05-24 RX ORDER — PSEUDOEPHEDRINE HCL 120 MG/1
120 TABLET, FILM COATED, EXTENDED RELEASE ORAL
Qty: 14 TABLET | Refills: 0 | Status: SHIPPED | OUTPATIENT
Start: 2022-05-24

## 2022-05-24 RX ORDER — ACETAMINOPHEN 500 MG
1000 TABLET ORAL
Qty: 40 TABLET | Refills: 0 | Status: SHIPPED | OUTPATIENT
Start: 2022-05-24

## 2022-05-24 RX ORDER — GUAIFENESIN 100 MG/5ML
400 SOLUTION ORAL
Status: COMPLETED | OUTPATIENT
Start: 2022-05-24 | End: 2022-05-24

## 2022-05-24 RX ORDER — PREDNISONE 10 MG/1
TABLET ORAL
Qty: 21 TABLET | Refills: 0 | OUTPATIENT
Start: 2022-05-24 | End: 2022-08-22

## 2022-05-24 RX ADMIN — GUAIFENESIN 400 MG: 200 SOLUTION ORAL at 08:48

## 2022-05-24 NOTE — ED PROVIDER NOTES
EMERGENCY DEPARTMENT HISTORY AND PHYSICAL EXAM      Date: 5/24/2022  Patient Name: Kaitlynn Payan    History of Presenting Illness     Chief Complaint   Patient presents with    Generalized Body Aches     pt presents with body aches, cough, SOB, and headache for a few days, pt reports feeling like her head is in a water tank. pt has had covid vaccine     Cough       History Provided By: Patient    HPI: Kaitlynn Payan, 36 y.o. female with no significant past medical history, presents to the ED with cc of cough. Over the last 3 days, the patient has had gradually worsening cough, nasal congestion, sinus pressure, generalized body aches, and fatigue. She woke up this morning feeling like she was short of breath. She reports chest pain with coughing and with taking a deep breath. She has not tried any over-the-counter medications today. She denies fevers, nausea, vomiting, diarrhea. She denies known ill contacts. She is immunized against COVID-19. There are no other complaints, changes, or physical findings at this time. PCP: None    No current facility-administered medications on file prior to encounter. No current outpatient medications on file prior to encounter. Past History     Past Medical History:  Past Medical History:   Diagnosis Date    Anemia     Costochondritis        Past Surgical History:  Past Surgical History:   Procedure Laterality Date    HX ORTHOPAEDIC      Right fourth finger, \"Digit widgit\"       Family History:  Family History   Family history unknown: Yes       Social History:  Social History     Tobacco Use    Smoking status: Former Smoker    Smokeless tobacco: Never Used    Tobacco comment: \"only when I drink\"   Substance Use Topics    Alcohol use: No     Comment: Occasionally    Drug use: No       Allergies:   Allergies   Allergen Reactions    Nsaids (Non-Steroidal Anti-Inflammatory Drug) Hives    Motrin [Ibuprofen] Hives         Review of Systems   Review of Systems   Constitutional: Positive for fatigue. Negative for chills and fever. HENT: Positive for congestion, rhinorrhea and sinus pressure. Negative for ear pain and sore throat. Eyes: Negative for redness and visual disturbance. Respiratory: Positive for cough. Negative for shortness of breath. Cardiovascular: Negative for chest pain and palpitations. Gastrointestinal: Negative for abdominal pain, diarrhea, nausea and vomiting. Genitourinary: Negative for dysuria and hematuria. Musculoskeletal: Positive for myalgias. Negative for back pain and gait problem. Skin: Negative for rash and wound. Neurological: Negative for dizziness and headaches. Psychiatric/Behavioral: Negative for behavioral problems and confusion. All other systems reviewed and are negative. Physical Exam   Physical Exam  Constitutional:       Appearance: She is not toxic-appearing. Comments: Patient is ill but nontoxic-appearing. HENT:      Head: Normocephalic and atraumatic. Nose: Congestion and rhinorrhea present. Mouth/Throat:      Mouth: Mucous membranes are moist.      Comments: Erythema of the posterior oropharynx. No tonsillar enlargement or exudates. Eyes:      Extraocular Movements: Extraocular movements intact. Pupils: Pupils are equal, round, and reactive to light. Cardiovascular:      Rate and Rhythm: Normal rate and regular rhythm. Heart sounds: Normal heart sounds. No murmur heard. Pulmonary:      Effort: Pulmonary effort is normal. No respiratory distress. Breath sounds: Normal breath sounds. No wheezing. Musculoskeletal:         General: No deformity. Normal range of motion. Cervical back: Normal range of motion and neck supple. Skin:     General: Skin is warm and dry. Neurological:      General: No focal deficit present. Mental Status: She is alert and oriented to person, place, and time.    Psychiatric:         Behavior: Behavior normal. Diagnostic Study Results     Labs -     Recent Results (from the past 12 hour(s))   METABOLIC PANEL, COMPREHENSIVE    Collection Time: 05/24/22  6:54 AM   Result Value Ref Range    Sodium 138 136 - 145 mmol/L    Potassium 3.6 3.5 - 5.1 mmol/L    Chloride 109 (H) 97 - 108 mmol/L    CO2 23 21 - 32 mmol/L    Anion gap 6 5 - 15 mmol/L    Glucose 110 (H) 65 - 100 mg/dL    BUN 4 (L) 6 - 20 MG/DL    Creatinine 0.76 0.55 - 1.02 MG/DL    BUN/Creatinine ratio 5 (L) 12 - 20      GFR est AA >60 >60 ml/min/1.73m2    GFR est non-AA >60 >60 ml/min/1.73m2    Calcium 8.4 (L) 8.5 - 10.1 MG/DL    Bilirubin, total 0.7 0.2 - 1.0 MG/DL    ALT (SGPT) 26 12 - 78 U/L    AST (SGOT) 22 15 - 37 U/L    Alk. phosphatase 74 45 - 117 U/L    Protein, total 7.2 6.4 - 8.2 g/dL    Albumin 3.2 (L) 3.5 - 5.0 g/dL    Globulin 4.0 2.0 - 4.0 g/dL    A-G Ratio 0.8 (L) 1.1 - 2.2     CBC WITH AUTOMATED DIFF    Collection Time: 05/24/22  8:17 AM   Result Value Ref Range    WBC 10.0 3.6 - 11.0 K/uL    RBC 4.42 3.80 - 5.20 M/uL    HGB 11.5 11.5 - 16.0 g/dL    HCT 36.2 35.0 - 47.0 %    MCV 81.9 80.0 - 99.0 FL    MCH 26.0 26.0 - 34.0 PG    MCHC 31.8 30.0 - 36.5 g/dL    RDW 13.9 11.5 - 14.5 %    PLATELET 157 370 - 644 K/uL    MPV 9.4 8.9 - 12.9 FL    NRBC 0.0 0  WBC    ABSOLUTE NRBC 0.00 0.00 - 0.01 K/uL    NEUTROPHILS 60 32 - 75 %    LYMPHOCYTES 30 12 - 49 %    MONOCYTES 9 5 - 13 %    EOSINOPHILS 1 0 - 7 %    BASOPHILS 0 0 - 1 %    IMMATURE GRANULOCYTES 0 0.0 - 0.5 %    ABS. NEUTROPHILS 6.0 1.8 - 8.0 K/UL    ABS. LYMPHOCYTES 3.0 0.8 - 3.5 K/UL    ABS. MONOCYTES 0.9 0.0 - 1.0 K/UL    ABS. EOSINOPHILS 0.1 0.0 - 0.4 K/UL    ABS. BASOPHILS 0.0 0.0 - 0.1 K/UL    ABS. IMM.  GRANS. 0.0 0.00 - 0.04 K/UL    DF AUTOMATED     D DIMER    Collection Time: 05/24/22  8:20 AM   Result Value Ref Range    D-dimer 0.48 0.00 - 0.65 mg/L FEU   SARS-COV-2    Collection Time: 05/24/22  8:20 AM   Result Value Ref Range    SARS-CoV-2 by PCR Please find results under separate order         Radiologic Studies -   XR CHEST PORT   Final Result   Negative. CT Results  (Last 48 hours)    None        CXR Results  (Last 48 hours)               05/24/22 0710  XR CHEST PORT Final result    Impression:  Negative. Narrative:  Clinical indication: Shortness of breath and cough. Frontal view of the chest obtained, comparison November 22, 2014. The a heart size is normal. There is no acute infiltrate. Medical Decision Making   I am the first provider for this patient. I reviewed the vital signs, available nursing notes, past medical history, past surgical history, family history and social history. Vital Signs-Reviewed the patient's vital signs. Patient Vitals for the past 12 hrs:   Temp Pulse Resp BP SpO2   05/24/22 0900 -- -- -- (!) 123/90 96 %   05/24/22 0756 -- -- -- 104/76 98 %   05/24/22 0643 98.9 °F (37.2 °C) 100 18 129/69 98 %         Records Reviewed: Nursing Notes and Old Medical Records      Provider Notes (Medical Decision Making):   DDx: Viral URI, pneumonia, pulmonary embolism    Patient presents with URI symptoms. Pt is afebrile and clinically well appearing. Lungs are clear to auscultation and she is maintaining high oxygenation on room air. Labs were reviewed and are reassuring. D-dimer is negative. Chest x-ray shows no evidence of pneumonia. COVID-19 swab was sent and is pending. Discussed symptomatic treatment, follow up, and return precautions. ED Course:   Initial assessment performed. The patients presenting problems have been discussed, and they are in agreement with the care plan formulated and outlined with them. I have encouraged them to ask questions as they arise throughout their visit. Disposition:  9:30 AM  The patient has been re-evaluated and is ready for discharge. Reviewed available results with patient. Counseled patient on diagnosis and care plan.  Patient has expressed understanding, and all questions have been answered. Patient agrees with plan and agrees to follow up as recommended, or to return to the ED if their symptoms worsen. Discharge instructions have been provided and explained to the patient, along with reasons to return to the ED. PLAN:  1. Discharge Medication List as of 5/24/2022  9:30 AM      START taking these medications    Details   predniSONE (STERAPRED DS) 10 mg dose pack Follow taper, Normal, Disp-21 Tablet, R-0      pseudoephedrine CR (Sudafed 12 Hour) 120 mg CR tablet Take 1 Tablet by mouth two (2) times daily as needed for Congestion. , Normal, Disp-14 Tablet, R-0      acetaminophen (Tylenol Extra Strength) 500 mg tablet Take 2 Tablets by mouth every six (6) hours as needed for Pain., Normal, Disp-40 Tablet, R-0           2. Follow-up Information     Follow up With Specialties Details Why Contact Info    Your primary care provider  Call  to schedule a follow up appointment     [facility] EMERGENCY DEPT Emergency Medicine Go to  If symptoms worsen [address] [place] [address]  [phone]        Return to ED if worse     Diagnosis     Clinical Impression:   1. Viral upper respiratory tract infection with cough            Vernon Diehl.  TYRESE Acosta

## 2022-05-24 NOTE — ED NOTES
Lab hemolyzed and IV infiltrated. This RN at bedside restart IV and acquire labs.  PA at bedside evaluating pt

## 2022-05-24 NOTE — Clinical Note
Tashia Ornelas was seen and treated in our emergency department on 5/24/2022. Please excuse her from work May 24-27.           [facility] Armas [facility]

## 2022-05-25 ENCOUNTER — PATIENT OUTREACH (OUTPATIENT)
Dept: CASE MANAGEMENT | Age: 32
End: 2022-05-25

## 2022-05-25 LAB
SARS-COV-2, XPLCVT: DETECTED
SOURCE, COVRS: ABNORMAL

## 2022-05-25 NOTE — PROGRESS NOTES
Patient contacted regarding COVID-19 risk. Discussed COVID-19 related testing which was available at this time. Test results were positive. Patient informed of results, if available? Previously informed. LPN Care Coordinator contacted the patient by telephone to perform post discharge assessment. Call within 2 business days of discharge: Yes Verified name and  with patient as identifiers. Provided introduction to self, and explanation of the CTN/ACM role, and reason for call due to risk factors for infection and/or exposure to COVID-19. Symptoms reviewed with patient who verbalized the following symptoms: no worsening symptoms      Due to no new or worsening symptoms encounter was not routed to provider for escalation. Discussed follow-up appointments. If no appointment was previously scheduled, appointment scheduling offered:  no. [facility] follow up appointment(s): No future appointments. Non-[facility] follow up appointment(s): Follow-up with PCP. Interventions to address risk factors: Obtained and reviewed discharge summary and/or continuity of care documents     Advance Care Planning:   Does patient have an Advance Directive: patient declined education. Educated patient about risk for severe COVID-19 due to risk factors according to CDC guidelines. LPN CC reviewed discharge instructions, medical action plan and red flag symptoms with the patient who verbalized understanding. Discussed COVID vaccination status: no. Education provided on COVID-19 vaccination as appropriate. Discussed exposure protocols and quarantine with CDC Guidelines. Patient was given an opportunity to verbalize any questions and concerns and agrees to contact LPN CC or health care provider for questions related to their healthcare. Reviewed and educated patient on any new and changed medications related to discharge diagnosis     Was patient discharged with a pulse oximeter?  no Discussed and confirmed pulse oximeter discharge instructions and when to notify provider or seek emergency care. LPN CC provided contact information. No further follow-up call identified based on severity of symptoms and risk factors.

## 2022-08-22 ENCOUNTER — HOSPITAL ENCOUNTER (EMERGENCY)
Age: 32
Discharge: HOME | End: 2022-08-22
Attending: EMERGENCY MEDICINE
Payer: MEDICAID

## 2022-08-22 ENCOUNTER — APPOINTMENT (OUTPATIENT)
Dept: GENERAL RADIOLOGY | Age: 32
End: 2022-08-22
Attending: PHYSICIAN ASSISTANT
Payer: MEDICAID

## 2022-08-22 VITALS
OXYGEN SATURATION: 98 % | RESPIRATION RATE: 18 BRPM | HEIGHT: 68 IN | SYSTOLIC BLOOD PRESSURE: 152 MMHG | BODY MASS INDEX: 34.86 KG/M2 | HEART RATE: 90 BPM | WEIGHT: 230 LBS | DIASTOLIC BLOOD PRESSURE: 81 MMHG | TEMPERATURE: 98.9 F

## 2022-08-22 DIAGNOSIS — R05.9 COUGH: Primary | ICD-10-CM

## 2022-08-22 DIAGNOSIS — Z20.822 PERSON UNDER INVESTIGATION FOR COVID-19: ICD-10-CM

## 2022-08-22 LAB — HCG UR QL: NEGATIVE

## 2022-08-22 PROCEDURE — 74011250637 HC RX REV CODE- 250/637: Performed by: PHYSICIAN ASSISTANT

## 2022-08-22 PROCEDURE — 71045 X-RAY EXAM CHEST 1 VIEW: CPT

## 2022-08-22 PROCEDURE — 81025 URINE PREGNANCY TEST: CPT

## 2022-08-22 PROCEDURE — 99283 EMERGENCY DEPT VISIT LOW MDM: CPT

## 2022-08-22 PROCEDURE — 36415 COLL VENOUS BLD VENIPUNCTURE: CPT

## 2022-08-22 PROCEDURE — U0005 INFEC AGEN DETEC AMPLI PROBE: HCPCS

## 2022-08-22 RX ORDER — DEXAMETHASONE SODIUM PHOSPHATE 100 MG/10ML
10 INJECTION INTRAMUSCULAR; INTRAVENOUS
Status: COMPLETED | OUTPATIENT
Start: 2022-08-22 | End: 2022-08-22

## 2022-08-22 RX ORDER — PROMETHAZINE HYDROCHLORIDE AND CODEINE PHOSPHATE 6.25; 1 MG/5ML; MG/5ML
5 SOLUTION ORAL
Qty: 100 ML | Refills: 0 | Status: SHIPPED | OUTPATIENT
Start: 2022-08-22 | End: 2022-08-27

## 2022-08-22 RX ORDER — AZITHROMYCIN 250 MG/1
TABLET, FILM COATED ORAL
Qty: 6 TABLET | Refills: 0 | Status: SHIPPED | OUTPATIENT
Start: 2022-08-22

## 2022-08-22 RX ORDER — METHYLPREDNISOLONE 4 MG/1
4 TABLET ORAL
Qty: 1 DOSE PACK | Refills: 0 | Status: SHIPPED | OUTPATIENT
Start: 2022-08-22

## 2022-08-22 RX ADMIN — DEXAMETHASONE SODIUM PHOSPHATE 10 MG: 10 INJECTION INTRAMUSCULAR; INTRAVENOUS at 22:10

## 2022-08-22 NOTE — Clinical Note
[facility] EMERGENCY DEPT  [address] [facility] [address]  [phone]    Work/School Note    Date: 8/22/2022     To Whom It May concern:    Lenard Patel was evaluated by the following provider(s):  Attending Provider: Ania Hester MD  Physician Assistant: Talitha Moritz, [address] virus is suspected. Per the CDC guidelines we recommend home isolation until the following conditions are all met:    1. At least five days have passed since symptoms first appeared and/or had a close exposure,   2. After home isolation for five days, wearing a mask around others for the next five days,  3. At least 24 have passed since last fever without the use of fever-reducing medications and  4.  Symptoms (eg cough, shortness of breath) have improved      Sincerely,          LISA Rios

## 2022-08-22 NOTE — Clinical Note
[facility] EMERGENCY DEPT  [address] [facility] [address] [phone]    Work/School Note    Date: 8/22/2022    To Whom It May concern:    Marylen Mince was seen and treated today in the emergency room by the following provider(s):  Attending Provider: Francia Epley, MD  Physician Assistant: Nikole Smith. Marylen Mince is excused from work/school on 8/22/2022 through 8/24/2022. She is medically clear to return to work/school on 8/25/2022.          Sincerely,          LISA Wheatley

## 2022-08-22 NOTE — ED TRIAGE NOTES
Pt reporting nonproductive cough x4days with sinus congestion and chest discomfort during coughing episodes. Denies hx asthma. Endorses hx acute bronchitis. Tried Nyquil and tea yesterday without relief.

## 2022-08-23 LAB
SARS-COV-2, XPLCVT: NOT DETECTED
SOURCE, COVRS: NORMAL

## 2022-08-23 NOTE — DISCHARGE INSTRUCTIONS
Steroid taper as prescribed (starting tomorrow, as your first dose was given to you here today)   Watch and wait antibiotics (especially pending results as we discussed)   Cough syrup as directed, as needed for persistent cough (be careful as this medication may cause drowsiness, therefore do not take with other sedating substances and keep out of reach of children).   Rest and drink plenty of fluids

## 2022-08-23 NOTE — ED PROVIDER NOTES
EMERGENCY DEPARTMENT HISTORY AND PHYSICAL EXAM      Date: 8/22/2022  Patient Name: Delvin Montes    History of Presenting Illness     Chief Complaint   Patient presents with    Cough       History Provided By: Patient    HPI: Delvin Montes, 36 y.o. female presents  to the ED with cc of mild, intermittent cough and congestion for the past 4 days. Cough is intermittently productive with green sputum. Denies hemoptysis. Endorses intermittent persistent episodes of coughing with associated chest soreness. Otherwise denies shortness of breath or wheezing. Coughing spells seem to be worse at night. States she had drink some tea with turmeric that provided mild, temporary symptom improvement last evening, but otherwise symptoms have persisted. Minimal improvement with NyQuil. Denies fevers or chill. Tolerating PO well, no changes in bowel or bladder habits. Denies any known sick contacts. Denies headache, neck pain or stiffness, sore throat, PND, LU, abdominal pain, nausea, vomiting, diarrhea, changes in bowel or bladder habits, leg pain or swelling, rashes, weakness, loss of consciousness. Patient is immunized against COVID19. There are no other complaints, changes, or physical findings at this time. PCP: None    Current Outpatient Medications   Medication Sig Dispense Refill    methylPREDNISolone (Medrol, Shar,) 4 mg tablet Take 1 Tablet by mouth Specific Days and Specific Times. 1 Dose Pack 0    azithromycin (Zithromax Z-Shar) 250 mg tablet Take two tabs now then one tab daily for the next four days 6 Tablet 0    promethazine-codeine (PHENERGAN with CODEINE) 6.25-10 mg/5 mL syrup Take 5 mL by mouth four (4) times daily as needed for Cough for up to 5 days. Max Daily Amount: 20 mL. 100 mL 0    pseudoephedrine CR (Sudafed 12 Hour) 120 mg CR tablet Take 1 Tablet by mouth two (2) times daily as needed for Congestion.  (Patient not taking: Reported on 8/22/2022) 14 Tablet 0    acetaminophen (Tylenol Extra Strength) 500 mg tablet Take 2 Tablets by mouth every six (6) hours as needed for Pain. (Patient not taking: Reported on 8/22/2022) 40 Tablet 0     Past History     Past Medical History:  Past Medical History:   Diagnosis Date    Anemia     Costochondritis        Past Surgical History:  Past Surgical History:   Procedure Laterality Date    HX ORTHOPAEDIC      Right fourth finger, \"Digit widgit\"       Family History:  Family History   Family history unknown: Yes       Social History:  Social History     Tobacco Use    Smoking status: Former    Smokeless tobacco: Never    Tobacco comments:     \"only when I drink\"   Substance Use Topics    Alcohol use: No     Comment: Occasionally    Drug use: No       Allergies: Allergies   Allergen Reactions    Nsaids (Non-Steroidal Anti-Inflammatory Drug) Hives    Motrin [Ibuprofen] Hives     Review of Systems   Review of Systems   Constitutional:  Negative for appetite change, chills and fever. HENT:  Positive for congestion and postnasal drip. Negative for sore throat. Eyes:  Negative for pain. Respiratory:  Positive for cough. Negative for shortness of breath and wheezing. Cardiovascular:  Negative for chest pain and leg swelling. Gastrointestinal:  Negative for abdominal pain, constipation, diarrhea, nausea and vomiting. Genitourinary:  Negative for difficulty urinating, dysuria, frequency, vaginal discharge and vaginal pain. Musculoskeletal:  Positive for myalgias. Negative for back pain, neck pain and neck stiffness. Skin:  Negative for rash. Neurological:  Negative for syncope, weakness, numbness and headaches. All other systems reviewed and are negative. Physical Exam   Physical Exam  Vitals and nursing note reviewed. Constitutional:       General: She is not in acute distress. Appearance: Normal appearance. She is not ill-appearing, toxic-appearing or diaphoretic.       Comments: 36 y.o. female   HENT:      Head: Normocephalic and atraumatic. Nose: Congestion present. Mouth/Throat:      Mouth: Mucous membranes are moist.   Eyes:      Extraocular Movements: Extraocular movements intact. Conjunctiva/sclera: Conjunctivae normal.   Cardiovascular:      Rate and Rhythm: Normal rate and regular rhythm. Pulses: Normal pulses. Heart sounds: Normal heart sounds. No murmur heard. No friction rub. No gallop. Pulmonary:      Effort: Pulmonary effort is normal. No respiratory distress. Breath sounds: Normal breath sounds. No stridor. No wheezing, rhonchi or rales. Comments: + generalized substernal chest wall tenderness, no crepitus, symmetric chest wall excursion   Chest:      Chest wall: Tenderness present. Abdominal:      General: There is no distension. Palpations: Abdomen is soft. There is no mass. Tenderness: There is no abdominal tenderness. There is no guarding. Musculoskeletal:         General: Normal range of motion. Cervical back: Normal range of motion. Right lower leg: No edema. Left lower leg: No edema. Skin:     General: Skin is warm and dry. Neurological:      General: No focal deficit present. Mental Status: She is alert and oriented to person, place, and time. Psychiatric:         Mood and Affect: Mood normal.         Behavior: Behavior normal.     Diagnostic Study Results     Labs -     Recent Results (from the past 12 hour(s))   HCG URINE, QL. - POC    Collection Time: 08/22/22 10:07 PM   Result Value Ref Range    Pregnancy test,urine (POC) Negative NEG         Radiologic Studies -   XR CHEST PORT   Final Result   No evidence of acute cardiopulmonary process. CT Results  (Last 48 hours)      None          CXR Results  (Last 48 hours)                 08/22/22 2223  XR CHEST PORT Final result    Impression:  No evidence of acute cardiopulmonary process. Narrative:  INDICATION: Cough. Chest soreness.        COMPARISON: 5/24/2022 FINDINGS: AP portable imaging of the chest performed at 10:19 PM demonstrates a   stable cardiomediastinal silhouette. The lungs are clear bilaterally. No   significant osseous abnormalities are seen. Medical Decision Making   I am the first provider for this patient. I reviewed the vital signs, available nursing notes, past medical history, past surgical history, family history and social history. Vital Signs-Reviewed the patient's vital signs. Patient Vitals for the past 12 hrs:   Temp Pulse Resp BP SpO2   08/22/22 2111 -- 90 -- -- --   08/22/22 1926 98.9 °F (37.2 °C) (!) 109 18 (!) 152/81 98 %           Records Reviewed: Nursing Notes and Old Medical Records    Provider Notes (Medical Decision Making):   Patient presents to the ED for evaluation as noted above. Afebrile, non-toxic appearing, no hypoxia or increased WOB, breath sounds clear throughout. Tolerating PO well. COVID19 testing pending, discussed myChart access, diagnosis and treatment plan. Differential diagnosis includes but not limited to influenza, Covid-19, viral syndrome, sinusitis; do not suspect meningitis, pneumonia, PE, ACS, sepsis,  or other emergent conditions requiring further evaluation/management acutely here at this time. CXR without consolidation or other acute cardiopulmonary findings. Shared decision making performed and care plan created together, discussed results, diagnosis and treatment plan. Will provide short course steroid taper for her symptoms and stronger cough syrup (discussed medication use and SE profile). Given duration of symptoms, will provide watch and wait antibiotics (discussed pending covid results, and duration of symptom onset along with risk/benefits/alternatives of antibiotic use in viral infections) Counseled symptomatic management techniques. PCP follow-up. Verbal return precautions advised. Patient verbalizes understanding and agreement of care.        ED Course: Initial assessment performed. The patients presenting problems have been discussed, and they are in agreement with the care plan formulated and outlined with them. I have encouraged them to ask questions as they arise throughout their visit. Disposition:  Discharge     PLAN:  1. Discharge Medication List as of 8/22/2022 10:49 PM        START taking these medications    Details   methylPREDNISolone (Medrol, Shar,) 4 mg tablet Take 1 Tablet by mouth Specific Days and Specific Times., Normal, Disp-1 Dose Pack, R-0      azithromycin (Zithromax Z-Shar) 250 mg tablet Take two tabs now then one tab daily for the next four days, Normal, Disp-6 Tablet, R-0      promethazine-codeine (PHENERGAN with CODEINE) 6.25-10 mg/5 mL syrup Take 5 mL by mouth four (4) times daily as needed for Cough for up to 5 days. Max Daily Amount: 20 mL., Normal, Disp-100 mL, R-0           CONTINUE these medications which have NOT CHANGED    Details   pseudoephedrine CR (Sudafed 12 Hour) 120 mg CR tablet Take 1 Tablet by mouth two (2) times daily as needed for Congestion. , Normal, Disp-14 Tablet, R-0      acetaminophen (Tylenol Extra Strength) 500 mg tablet Take 2 Tablets by mouth every six (6) hours as needed for Pain., Normal, Disp-40 Tablet, R-0           2. Follow-up Information       Follow up With Specialties Details Why [address] [facility] EMERGENCY DEPT Emergency Medicine  As needed, If symptoms worsen [address]  [facility], [address] [place] [address] [address]    Sommer Daniel MD Internal Medicine Physician   [address] [place] [address] Mitchell Duncan  [address]  [phone]            Return to ED if worse     Diagnosis     Clinical Impression:   1. Cough    2.  Person under investigation for COVID-19

## 2022-09-22 ENCOUNTER — HOSPITAL ENCOUNTER (EMERGENCY)
Age: 32
Discharge: LEFT BEFORE DISCHARGE | End: 2022-09-22

## 2023-02-13 ENCOUNTER — OFFICE VISIT (OUTPATIENT)
Dept: INTERNAL MEDICINE CLINIC | Age: 33
End: 2023-02-13
Payer: MEDICAID

## 2023-02-13 VITALS
TEMPERATURE: 97 F | BODY MASS INDEX: 38.41 KG/M2 | HEIGHT: 66 IN | OXYGEN SATURATION: 97 % | HEART RATE: 66 BPM | DIASTOLIC BLOOD PRESSURE: 89 MMHG | WEIGHT: 239 LBS | SYSTOLIC BLOOD PRESSURE: 128 MMHG | RESPIRATION RATE: 16 BRPM

## 2023-02-13 DIAGNOSIS — Z13.220 SCREENING CHOLESTEROL LEVEL: ICD-10-CM

## 2023-02-13 DIAGNOSIS — R03.0 PRE-HYPERTENSION: ICD-10-CM

## 2023-02-13 DIAGNOSIS — R35.0 URINARY FREQUENCY: Primary | ICD-10-CM

## 2023-02-13 DIAGNOSIS — Z00.00 ENCOUNTER FOR MEDICAL EXAMINATION TO ESTABLISH CARE: ICD-10-CM

## 2023-02-13 DIAGNOSIS — Z13.1 SCREENING FOR DIABETES MELLITUS: ICD-10-CM

## 2023-02-13 LAB
BILIRUB UR QL STRIP: NEGATIVE
GLUCOSE UR-MCNC: NEGATIVE MG/DL
KETONES P FAST UR STRIP-MCNC: NEGATIVE MG/DL
PH UR STRIP: 6 [PH] (ref 4.6–8)
PROT UR QL STRIP: NEGATIVE
SP GR UR STRIP: 1.03 (ref 1–1.03)
UA UROBILINOGEN AMB POC: NORMAL (ref 0.2–1)
URINALYSIS CLARITY POC: NORMAL
URINALYSIS COLOR POC: YELLOW
URINE BLOOD POC: NEGATIVE
URINE LEUKOCYTES POC: NEGATIVE
URINE NITRITES POC: NEGATIVE

## 2023-02-13 PROCEDURE — 99204 OFFICE O/P NEW MOD 45 MIN: CPT | Performed by: INTERNAL MEDICINE

## 2023-02-13 PROCEDURE — 81002 URINALYSIS NONAUTO W/O SCOPE: CPT | Performed by: INTERNAL MEDICINE

## 2023-02-13 NOTE — PROGRESS NOTES
Ms. Lyla Phillips is a new patient who is here to establish care. CC:  New Patient, LOW BACK PAIN, and Urinary Frequency       HPI:    Notes frequent urination and thirst - UA is normal  No pain with urination    Also complains of lower back pain on both sides of back     Had ring finger 4th finger on the right finger lost finger during a play and had a digit widgit surgery and surgery was not successful    Son 2 yo   Single   Sexually active with men     In school obtaining [employer]     Review of systems:  Constitutional: negative for fever, chills, weight loss, night sweats   Eyes : negative for vision changes, eye pain and discharge  Nose and Throat: negative for tinnitus, sore throat   Cardiovascular: negative for chest pain, palpitations and shortness of breath  Respiratory: negative for shortness of breath, cough and wheezing   Gastroinstestinal: negative for abdominal pain, nausea, vomiting, diarrhea, constipation, and blood in the stool  Musculoskeletal: has pain in right lower back   Genitourinary: negative for dysuria, nocturia, polyuria and hematuria   Neurologic: Negative for focal weakness, numbness or incoordination  Skin: negative for rash, pruritus  Hematologic: negative for easy bruising      Past Medical History:   Diagnosis Date    Anemia     Costochondritis         Past Surgical History:   Procedure Laterality Date    HX ORTHOPAEDIC      Right fourth finger, \"Digit widgit\"       Allergies   Allergen Reactions    Nsaids (Non-Steroidal Anti-Inflammatory Drug) Hives    Motrin [Ibuprofen] Hives       No current outpatient medications on file prior to visit. No current facility-administered medications on file prior to visit. family history includes Diabetes in her mother; Hypertension in her father.     Social History     Socioeconomic History    Marital status: SINGLE     Spouse name: Not on file    Number of children: Not on file    Years of education: Not on file    Highest education level: Not on file   Occupational History    Not on file   Tobacco Use    Smoking status: Former    Smokeless tobacco: Never    Tobacco comments:     \"only when I drink\"   Vaping Use    Vaping Use: Not on file   Substance and Sexual Activity    Alcohol use: No     Comment: Occasionally    Drug use: No    Sexual activity: Yes     Partners: Male     Birth control/protection: None   Other Topics Concern     Service Not Asked    Blood Transfusions Not Asked    Caffeine Concern Not Asked    Occupational Exposure Not Asked    Hobby Hazards Not Asked    Sleep Concern Not Asked    Stress Concern Not Asked    Weight Concern Not Asked    Special Diet Not Asked    Back Care Not Asked    Exercise Not Asked    Bike Helmet Not Asked    Seat Belt Not Asked    Self-Exams Not Asked   Social History Narrative    Not on file     Social Determinants of Health     Financial Resource Strain: Not on file   Food Insecurity: Not on file   Transportation Needs: Not on file   Physical Activity: Sufficiently Active    Days of Exercise per Week: 5 days    Minutes of Exercise per Session: 30 min   Stress: Not on file   Social Connections: Not on file   Intimate Partner Violence: Not At Risk    Fear of Current or Ex-Partner: No    Emotionally Abused: No    Physically Abused: No    Sexually Abused: No   Housing Stability: Not on file       Visit Vitals  /89 (BP 1 Location: Left upper arm, BP Patient Position: Sitting, BP Cuff Size: Adult)   Pulse 66   Temp 97 °F (36.1 °C) (Temporal)   Resp 16   Ht 5' 6\" (1.676 m)   Wt 239 lb (108.4 kg)   LMP 01/20/2023   SpO2 97%   BMI 38.58 kg/m²     General:  Well appearing female no acute distress  HEENT:   PERRL,normal conjunctiva. External ear and canals normal, TMs normal.  Hearing normal to voice. Nose without edema or discharge, normal septum. Lips, teeth, gums normal.  Oropharynx: no erythema, no exudates, no lesions, normal tongue. Neck:  Supple.  Thyroid normal size, nontender, without nodules. No carotid bruit. No masses or lymphadenopathy  Respiratory: no respiratory distress,  no wheezing, no rhonchi, no rales. No chest wall tenderness. Cardiovascular:  RRR, normal S1S2, no murmur. Gastrointestinal: normal bowel sounds, soft, nontender, without masses. No hepatosplenomegaly. Extremities +2 pulses, no edema, normal sensation   Musculoskeletal:  Normal gait. Normal digits and nails. Normal strength and tone, no atrophy, and no abnormal movement. Skin:  No rash, no lesions, no ulcers. Skin warm, normal turgor, without induration or nodules. Neuro:  A and OX4, fluent speech, cranial nerves normal 2-12. Sensation normal to light touch. DTR symmetrical  Psych:  Normal affect      Lab Results   Component Value Date/Time    WBC 10.0 05/24/2022 08:17 AM    Hemoglobin (POC) 13.9 11/25/2014 05:26 PM    HGB 11.5 05/24/2022 08:17 AM    Hematocrit (POC) 35 03/26/2019 09:14 PM    HCT 36.2 05/24/2022 08:17 AM    PLATELET 959 58/68/5460 08:17 AM    MCV 81.9 05/24/2022 08:17 AM     Lab Results   Component Value Date/Time    Sodium 138 05/24/2022 06:54 AM    Potassium 3.6 05/24/2022 06:54 AM    Chloride 109 (H) 05/24/2022 06:54 AM    CO2 23 05/24/2022 06:54 AM    Anion gap 6 05/24/2022 06:54 AM    Glucose 110 (H) 05/24/2022 06:54 AM    BUN 4 (L) 05/24/2022 06:54 AM    Creatinine 0.76 05/24/2022 06:54 AM    BUN/Creatinine ratio 5 (L) 05/24/2022 06:54 AM    GFR est AA >60 05/24/2022 06:54 AM    GFR est non-AA >60 05/24/2022 06:54 AM    Calcium 8.4 (L) 05/24/2022 06:54 AM     No results found for: CHOL, CHOLPOCT, CHOLX, CHLST, CHOLV, HDL, HDLPOC, HDLP, LDL, LDLCPOC, LDLC, DLDLP, VLDLC, VLDL, TGLX, TRIGL, TRIGP, TGLPOCT, CHHD, CHHDX  No results found for: TSH, TSH2, TSH3, TSHP, TSHEXT, TSHEXT  No results found for: HBA1C, LIK1UVNK, BDZ7BWOX, RMC9AUEQ  No results found for: VITD3, XQVID2, XQVID3, XQVID, VD3RIA                Assessment and Plan:     1.  Urinary frequency  No evidence of UTI check for possible diabetes   - AMB POC URINALYSIS DIP STICK MANUAL W/O MICRO  - METABOLIC PANEL, COMPREHENSIVE; Future  - CBC WITH AUTOMATED DIFF; Future  - METABOLIC PANEL, COMPREHENSIVE  - CBC WITH AUTOMATED DIFF    2. Encounter for medical examination to establish care    3. Screening cholesterol level  - LIPID PANEL; Future  - LIPID PANEL    4. Screening for diabetes mellitus  - HEMOGLOBIN A1C WITH EAG; Future  - HEMOGLOBIN A1C WITH EAG    5. Pre-hypertension  Counseled on lifestyle changes     6.  Right lower back pain- MSK related, discussed doing regular stretches  Pap smear is overdue          Stephan Conte MD

## 2023-02-13 NOTE — PROGRESS NOTES
1. \"Have you been to the ER, urgent care clinic since your last visit? Hospitalized since your last visit? \" Yes, [facility], 11/2022, upper respiratory virus    2. \"Have you seen or consulted any other health care providers outside of the [address] [place] [address] since your last visit? \" No     3. For patients aged 39-70: Has the patient had a colonoscopy / FIT/ Cologuard? NA - based on age      If the patient is female:    4. For patients aged 41-77: Has the patient had a mammogram within the past 2 years? NA - based on age or sex      11. For patients aged 21-65: Has the patient had a pap smear?  No

## 2023-02-14 LAB
ALBUMIN SERPL-MCNC: 4.4 G/DL (ref 3.9–5)
ALBUMIN/GLOB SERPL: 1.6 {RATIO} (ref 1.2–2.2)
ALP SERPL-CCNC: 75 IU/L (ref 44–121)
ALT SERPL-CCNC: 14 IU/L (ref 0–32)
AST SERPL-CCNC: 17 IU/L (ref 0–40)
BASOPHILS # BLD AUTO: 0.1 X10E3/UL (ref 0–0.2)
BASOPHILS NFR BLD AUTO: 1 %
BILIRUB SERPL-MCNC: 0.8 MG/DL (ref 0–1.2)
BUN SERPL-MCNC: 10 MG/DL (ref 6–20)
BUN/CREAT SERPL: 13 (ref 9–23)
CALCIUM SERPL-MCNC: 9.1 MG/DL (ref 8.7–10.2)
CHLORIDE SERPL-SCNC: 102 MMOL/L (ref 96–106)
CHOLEST SERPL-MCNC: 149 MG/DL (ref 100–199)
CO2 SERPL-SCNC: 24 MMOL/L (ref 20–29)
CREAT SERPL-MCNC: 0.76 MG/DL (ref 0.57–1)
EGFRCR SERPLBLD CKD-EPI 2021: 108 ML/MIN/1.73
EOSINOPHIL # BLD AUTO: 0.1 X10E3/UL (ref 0–0.4)
EOSINOPHIL NFR BLD AUTO: 1 %
ERYTHROCYTE [DISTWIDTH] IN BLOOD BY AUTOMATED COUNT: 12.6 % (ref 11.7–15.4)
EST. AVERAGE GLUCOSE BLD GHB EST-MCNC: 108 MG/DL
GLOBULIN SER CALC-MCNC: 2.7 G/DL (ref 1.5–4.5)
GLUCOSE SERPL-MCNC: 88 MG/DL (ref 70–99)
HBA1C MFR BLD: 5.4 % (ref 4.8–5.6)
HCT VFR BLD AUTO: 38.6 % (ref 34–46.6)
HDLC SERPL-MCNC: 48 MG/DL
HGB BLD-MCNC: 13 G/DL (ref 11.1–15.9)
IMM GRANULOCYTES # BLD AUTO: 0 X10E3/UL (ref 0–0.1)
IMM GRANULOCYTES NFR BLD AUTO: 0 %
LDLC SERPL CALC-MCNC: 82 MG/DL (ref 0–99)
LYMPHOCYTES # BLD AUTO: 4.2 X10E3/UL (ref 0.7–3.1)
LYMPHOCYTES NFR BLD AUTO: 43 %
MCH RBC QN AUTO: 27.5 PG (ref 26.6–33)
MCHC RBC AUTO-ENTMCNC: 33.7 G/DL (ref 31.5–35.7)
MCV RBC AUTO: 82 FL (ref 79–97)
MONOCYTES # BLD AUTO: 0.6 X10E3/UL (ref 0.1–0.9)
MONOCYTES NFR BLD AUTO: 6 %
NEUTROPHILS # BLD AUTO: 5 X10E3/UL (ref 1.4–7)
NEUTROPHILS NFR BLD AUTO: 49 %
PLATELET # BLD AUTO: 309 X10E3/UL (ref 150–450)
POTASSIUM SERPL-SCNC: 3.8 MMOL/L (ref 3.5–5.2)
PROT SERPL-MCNC: 7.1 G/DL (ref 6–8.5)
RBC # BLD AUTO: 4.73 X10E6/UL (ref 3.77–5.28)
SODIUM SERPL-SCNC: 140 MMOL/L (ref 134–144)
TRIGL SERPL-MCNC: 102 MG/DL (ref 0–149)
VLDLC SERPL CALC-MCNC: 19 MG/DL (ref 5–40)
WBC # BLD AUTO: 10 X10E3/UL (ref 3.4–10.8)

## 2023-03-10 ENCOUNTER — HOSPITAL ENCOUNTER (EMERGENCY)
Age: 33
Discharge: HOME | End: 2023-03-10
Attending: STUDENT IN AN ORGANIZED HEALTH CARE EDUCATION/TRAINING PROGRAM
Payer: MEDICAID

## 2023-03-10 VITALS
OXYGEN SATURATION: 99 % | HEIGHT: 68 IN | TEMPERATURE: 98.4 F | RESPIRATION RATE: 14 BRPM | SYSTOLIC BLOOD PRESSURE: 131 MMHG | HEART RATE: 74 BPM | BODY MASS INDEX: 34.86 KG/M2 | DIASTOLIC BLOOD PRESSURE: 92 MMHG | WEIGHT: 230 LBS

## 2023-03-10 DIAGNOSIS — M54.16 LUMBAR BACK PAIN WITH RADICULOPATHY AFFECTING LEFT LOWER EXTREMITY: Primary | ICD-10-CM

## 2023-03-10 PROCEDURE — 74011250637 HC RX REV CODE- 250/637: Performed by: PHYSICIAN ASSISTANT

## 2023-03-10 PROCEDURE — 99283 EMERGENCY DEPT VISIT LOW MDM: CPT

## 2023-03-10 PROCEDURE — 74011636637 HC RX REV CODE- 636/637: Performed by: PHYSICIAN ASSISTANT

## 2023-03-10 RX ORDER — PREDNISONE 20 MG/1
40 TABLET ORAL
Status: COMPLETED | OUTPATIENT
Start: 2023-03-10 | End: 2023-03-10

## 2023-03-10 RX ORDER — OXYCODONE AND ACETAMINOPHEN 5; 325 MG/1; MG/1
1 TABLET ORAL
Qty: 6 TABLET | Refills: 0 | Status: SHIPPED | OUTPATIENT
Start: 2023-03-10 | End: 2023-03-13

## 2023-03-10 RX ORDER — OXYCODONE AND ACETAMINOPHEN 5; 325 MG/1; MG/1
1 TABLET ORAL
Status: COMPLETED | OUTPATIENT
Start: 2023-03-10 | End: 2023-03-10

## 2023-03-10 RX ORDER — PREDNISONE 20 MG/1
20 TABLET ORAL DAILY
Qty: 5 TABLET | Refills: 0 | Status: SHIPPED | OUTPATIENT
Start: 2023-03-10 | End: 2023-03-15

## 2023-03-10 RX ADMIN — PREDNISONE 40 MG: 20 TABLET ORAL at 15:20

## 2023-03-10 RX ADMIN — OXYCODONE AND ACETAMINOPHEN 1 TABLET: 5; 325 TABLET ORAL at 15:20

## 2023-03-10 NOTE — ED PROVIDER NOTES
[facility] EMERGENCY DEPT  EMERGENCY DEPARTMENT ENCOUNTER       Pt Name: Katie Rangel  MRN: [id number]  [place] 1990  Date of evaluation: 3/10/2023  Provider: LISA Salgado   PCP: Amanda Lynn MD  Note Started: 3:13 PM 3/10/23     CHIEF COMPLAINT       Chief Complaint   Patient presents with    Back Pain     Lower back pain on Wednesday, no fall no known injury. Arrives by rescue. Pain shoots from middle of back down left side. HISTORY OF PRESENT ILLNESS: 1 or more elements      History From: Patient  HPI Limitations : None     Luann Keller is a 29 y.o. female who presents BIB self with CC of atraumatic lumbar back pain worsening over the last 2 days. Pain is described as sharp and like spasms, felt in the generalized lumbar back, worse on the left side. With certain movements the pain radiates down the lateral aspect of the left leg. She reports a history of herniated disc several years ago that did not require further intervention. 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 She denies loss of bladder or bowel function, saddle anesthesia, lower extremity numbness/tingling/weakness. No IVDU, fever/chills, dysuria, hematuria, change in bowel movements, abdominal pain, flank pain. LMP 2/20/2023, denies possibility of pregnancy. Nursing Notes were all reviewed and agreed with or any disagreements were addressed in the HPI. REVIEW OF SYSTEMS      Review of Systems   Constitutional:  Negative for chills and fever. Respiratory:  Negative for shortness of breath. Cardiovascular:  Negative for chest pain. Gastrointestinal:  Negative for abdominal pain, nausea and vomiting. Genitourinary:  Negative for difficulty urinating, dysuria and flank pain. Musculoskeletal:  Positive for back pain. Skin:  Negative for rash. Allergic/Immunologic: Negative for immunocompromised state. Neurological:  Negative for weakness and numbness. Hematological:  Does not bruise/bleed easily. Positives and Pertinent negatives as per HPI. PAST HISTORY     Past Medical History:  Past Medical History:   Diagnosis Date    Anemia     Costochondritis        Past Surgical History:  Past Surgical History:   Procedure Laterality Date    HX ORTHOPAEDIC      Right fourth finger, \"Digit widgit\"       Family History:  Family History   Problem Relation Age of Onset    Diabetes Mother     Hypertension Father        Social History:  Social History     Tobacco Use    Smoking status: Former    Smokeless tobacco: Never    Tobacco comments:     \"only when I drink\"   Substance Use Topics    Alcohol use: No     Comment: Occasionally    Drug use: No       Allergies: Allergies   Allergen Reactions    Nsaids (Non-Steroidal Anti-Inflammatory Drug) Hives    Motrin [Ibuprofen] Hives       CURRENT MEDICATIONS      Previous Medications    No medications on file       PHYSICAL EXAM      ED Triage Vitals [03/10/23 1344]   ED Encounter Vitals Group      BP (!) 131/92      Pulse (Heart Rate) 74      Resp Rate 14      Temp 98.4 °F (36.9 °C)      Temp src       O2 Sat (%) 100 %      Weight 230 lb      Height 5' 8\"        Physical Exam  Vitals and nursing note reviewed. Constitutional:       General: She is in acute distress (in pain). Appearance: She is not toxic-appearing or diaphoretic. HENT:      Head: Normocephalic and atraumatic. Nose: Nose normal.   Eyes:      Extraocular Movements: Extraocular movements intact. Conjunctiva/sclera: Conjunctivae normal.   Cardiovascular:      Rate and Rhythm: Normal rate and regular rhythm. Pulmonary:      Effort: Pulmonary effort is normal.      Breath sounds: Normal breath sounds. Abdominal:      General: Abdomen is flat. Bowel sounds are normal. There is no distension. Palpations: Abdomen is soft. Tenderness: There is no abdominal tenderness.  There is no right CVA tenderness, left CVA tenderness or guarding. Musculoskeletal:      Cervical back: Normal range of motion and neck supple. Comments: Diffusely tender in the generalized lumbar back. Reported pain is worse on the left side. There are no focal areas of tenderness. 5/5 strength and sensation b/l LEs. Gait is steady and symmetrical.   Skin:     General: Skin is warm and dry. Neurological:      General: No focal deficit present. Mental Status: She is alert and oriented to person, place, and time. Psychiatric:         Mood and Affect: Mood normal.         Behavior: Behavior normal.        DIAGNOSTIC RESULTS   LABS:     No results found for this or any previous visit (from the past 12 hour(s)). EKG: When ordered, EKG's are interpreted by the Emergency Department Physician in the absence of a cardiologist.  Please see their note for interpretation of EKG. RADIOLOGY:  Non-plain film images such as CT, Ultrasound and MRI are read by the radiologist. Plain radiographic images are visualized and preliminarily interpreted by the ED Provider with the below findings:          Interpretation per the Radiologist below, if available at the time of this note:     No results found.       PROCEDURES   Unless otherwise noted below, none  Procedures     CRITICAL CARE TIME       EMERGENCY DEPARTMENT COURSE and DIFFERENTIAL DIAGNOSIS/MDM   Vitals:    Vitals:    03/10/23 1344 03/10/23 1524   BP: (!) 131/92    Pulse: 74    Resp: 14    Temp: 98.4 °F (36.9 °C)    SpO2: 100% 99%   Weight: 104.3 kg (230 lb)    Height: 5' 8\" (1.727 m)         Patient was given the following medications:  Medications   predniSONE (DELTASONE) tablet 40 mg (40 mg Oral Given 3/10/23 1520)   oxyCODONE-acetaminophen (PERCOCET) 5-325 mg per tablet 1 Tablet (1 Tablet Oral Given 3/10/23 1520)       CONSULTS: (Who and What was discussed)  None    Chronic Conditions:     Social Determinants affecting Dx or Tx: None    Records Reviewed (source and summary of external records): Prior medical records, Previous Radiology studies, and Previous Laboratory studies    CC/HPI Summary, DDx, ED Course, and Reassessment:     29-year-old female without reported past medical history presents with atraumatic low back pain over the last 2 days, described as shooting and like spasms, felt in the entire lumbar region but worse on the left side, rating down the left lateral leg. Radiculopathy appears to be elicited on movements. She denies loss of bladder or bowel function, saddle anesthesia, lower extremity numbness/tingling/weakness. No back pain red flag signs or symptoms. No associated signs/symptoms to suggest GI or  etiology of symptoms. LMP 2/20/2023, low suspicion for ectopic or pregnancy related pain. The patient does admit a history of a herniated disc. We will plan to treat with a course of steroids, as needed Norco, lidocaine patches, and supportive care at home. Follow-up with PCP, Ortho. ED return precautions reviewed. Disposition Considerations (Tests not done, Shared Decision Making, Pt Expectation of Test or Tx.): as above     FINAL IMPRESSION     1. Lumbar back pain with radiculopathy affecting left lower extremity          DISPOSITION/PLAN   Discharged    Discharge Note: The patient is stable for discharge home. The signs, symptoms, diagnosis, and discharge instructions have been discussed, understanding conveyed, and agreed upon. The patient is to follow up as recommended or return to ER should their symptoms worsen.       PATIENT REFERRED TO:  Follow-up Information       Follow up With Specialties Details Why Contact Info    [facility] EMERGENCY DEPT Emergency Medicine  As needed, If symptoms worsen [address] [place] [address]    Cassandra Burkett MD Internal Medicine Physician Call  For follow up [address] [facility]  [phone]                DISCHARGE MEDICATIONS:  Current Discharge Medication List        START taking these medications    Details   predniSONE (DELTASONE) 20 mg tablet Take 1 Tablet by mouth daily for 5 days. With Breakfast  Qty: 5 Tablet, Refills: 0  Start date: 3/10/2023, End date: 3/15/2023      oxyCODONE-acetaminophen (Percocet) 5-325 mg per tablet Take 1 Tablet by mouth every six (6) hours as needed for Pain for up to 3 days. Max Daily Amount: 4 Tablets. Qty: 6 Tablet, Refills: 0  Start date: 3/10/2023, End date: 3/13/2023    Associated Diagnoses: Lumbar back pain with radiculopathy affecting left lower extremity                 DISCONTINUED MEDICATIONS:  Current Discharge Medication List            ED Attending Involvement : I have seen and evaluated the patient. My supervision physician was available for consultation. I am the Primary Clinician of Record. Nikole Nichols (electronically signed)    (Please note that parts of this dictation were completed with voice recognition software. Quite often unanticipated grammatical, syntax, homophones, and other interpretive errors are inadvertently transcribed by the computer software. Please disregards these errors.  Please excuse any errors that have escaped final proofreading.)

## 2023-03-10 NOTE — Clinical Note
[address]  [facility] EMERGENCY DEPT  [address] [id number] [phone]    Work/School Note    Date: 3/10/2023    To Whom It May concern:    Olive Velasquez was seen and treated today in the emergency room by the following provider(s):  Attending Provider: Salena Ordoñez MD  Physician Assistant: Nikole Fontana. Olive Velasquez is excused from work/school on 3/10/2023 through 3/12/2023. She is medically clear to return to work/school on 3/13/2023.          Sincerely,          Nikole Boyd

## 2023-03-21 NOTE — ED PROVIDER NOTES
EMERGENCY DEPARTMENT HISTORY AND PHYSICAL EXAM      Date: 12/9/2018  Patient Name: Mónica Das    History of Presenting Illness     Chief Complaint   Patient presents with    Cough     x3 days    Chest Pain     x3 days       History Provided By: Patient    HPI: Mónica Das, 34 y.o. female with PMHx significant for smoking, presents ambulatory to the ED with cc of cough and wheezing. No fever, no ENT pain. Pt is stable and no resp distrress          There are no other complaints, changes, or physical findings at this time. PCP: None    Current Outpatient Medications   Medication Sig Dispense Refill    amoxicillin 500 mg tab Take 500 mg by mouth three (3) times daily. 30 Tab 0       Past History     Past Medical History:  Past Medical History:   Diagnosis Date    Costochondritis        Past Surgical History:  Past Surgical History:   Procedure Laterality Date    HX ORTHOPAEDIC      Right fourth finger, \"Digit widgit\"       Family History:  Family History   Family history unknown: Yes       Social History:  Social History     Tobacco Use    Smoking status: Current Some Day Smoker    Smokeless tobacco: Never Used    Tobacco comment: \"only when I drink\"   Substance Use Topics    Alcohol use: Yes     Comment: Occasionally    Drug use: No       Allergies: Allergies   Allergen Reactions    Motrin [Ibuprofen] Hives         Review of Systems   Review of Systems   Constitutional: Negative for chills and fever. HENT: Negative for congestion, rhinorrhea, sneezing and sore throat. Respiratory: Positive for cough and shortness of breath. Negative for apnea. Cardiovascular: Negative for chest pain. Gastrointestinal: Negative for abdominal pain, nausea and vomiting. Musculoskeletal: Negative for back pain, myalgias and neck stiffness. Skin: Negative for rash. Neurological: Negative for dizziness, weakness and headaches. All other systems reviewed and are negative.       Physical Exam Physical Exam   Constitutional: She is oriented to person, place, and time. She appears well-developed and well-nourished. HENT:   Head: Normocephalic and atraumatic. Mouth/Throat: Oropharynx is clear and moist.   Eyes: Conjunctivae and EOM are normal.   Neck: Normal range of motion and full passive range of motion without pain. Neck supple. Cardiovascular: Normal rate, regular rhythm, S1 normal, S2 normal, normal heart sounds, intact distal pulses and normal pulses. No murmur heard. Pulmonary/Chest: Effort normal and breath sounds normal. No respiratory distress. She has no wheezes. Abdominal: Soft. Normal appearance and bowel sounds are normal. She exhibits no distension. There is no tenderness. There is no rebound. Musculoskeletal: Normal range of motion. Neurological: She is alert and oriented to person, place, and time. She has normal strength. Skin: Skin is warm, dry and intact. No rash noted. Psychiatric: She has a normal mood and affect. Her speech is normal and behavior is normal. Judgment and thought content normal.   Nursing note and vitals reviewed. Diagnostic Study Results     Labs -     Recent Results (from the past 12 hour(s))   HCG URINE, QL. - POC    Collection Time: 12/09/18 10:57 PM   Result Value Ref Range    Pregnancy test,urine (POC) NEGATIVE  NEG     D DIMER    Collection Time: 12/09/18 11:01 PM   Result Value Ref Range    D-dimer 0.28 0.00 - 0.65 mg/L FEU   HCG URINE, QL. - POC    Collection Time: 12/09/18 11:13 PM   Result Value Ref Range    Pregnancy test,urine (POC) POSITIVE (A) NEG     POC TROPONIN-I    Collection Time: 12/09/18 11:24 PM   Result Value Ref Range    Troponin-I (POC) <0.04 0.00 - 0.08 ng/mL       Radiologic Studies -   No orders to display     CT Results  (Last 48 hours)    None        CXR Results  (Last 48 hours)    None            Medical Decision Making   I am the first provider for this patient.     I reviewed the vital signs, available nursing Vj Galeano) notes, past medical history, past surgical history, family history and social history. Vital Signs-Reviewed the patient's vital signs. Patient Vitals for the past 12 hrs:   Temp Pulse Resp BP SpO2   12/09/18 2235 98.2 °F (36.8 °C) 98 18 121/76 100 %         Records Reviewed: Nursing Notes    Provider Notes (Medical Decision Making):   Bronchitis vs pneumonia    ED Course:   Initial assessment performed. The patients presenting problems have been discussed, and they are in agreement with the care plan formulated and outlined with them. I have encouraged them to ask questions as they arise throughout their visit. Disposition:  Patient informed of results of workup and is comfortable with discharge to home to follow up with PCP. They are instructed to return as needed for worsening condition. PLAN:  1. Discharge Medication List as of 12/9/2018 11:56 PM      START taking these medications    Details   amoxicillin 500 mg tab Take 500 mg by mouth three (3) times daily. , Normal, Disp-30 Tab, R-0           2. Follow-up Information     Follow up With Specialties Details Why Contact Info    None    None ([phone]) Patient stated that they have no PCP      [facility] EMERGENCY DEPT Emergency Medicine  As needed, If symptoms worsen [address] AT [facility] Obstetrics & Gynecology Call  [place]  [address]  [phone]        Return to ED if worse     Diagnosis     Clinical Impression:   1. Acute bronchitis, unspecified organism    2. Chest wall pain    3.  Pregnant and not yet delivered in first trimester

## 2023-04-23 DIAGNOSIS — M54.50 LUMBAR PAIN: ICD-10-CM

## 2023-04-23 DIAGNOSIS — M54.16 LUMBAR RADICULOPATHY: Primary | ICD-10-CM

## 2023-04-24 ENCOUNTER — HOSPITAL ENCOUNTER (OUTPATIENT)
Dept: MRI IMAGING | Age: 33
Discharge: HOME | End: 2023-04-24
Attending: STUDENT IN AN ORGANIZED HEALTH CARE EDUCATION/TRAINING PROGRAM
Payer: MEDICAID

## 2023-04-24 DIAGNOSIS — M54.16 LUMBAR RADICULOPATHY: ICD-10-CM

## 2023-04-24 DIAGNOSIS — M54.50 LUMBAR PAIN: ICD-10-CM

## 2023-04-24 PROCEDURE — 72148 MRI LUMBAR SPINE W/O DYE: CPT

## 2023-05-04 ENCOUNTER — HOSPITAL ENCOUNTER (EMERGENCY)
Age: 33
Discharge: HOME | End: 2023-05-04
Attending: STUDENT IN AN ORGANIZED HEALTH CARE EDUCATION/TRAINING PROGRAM
Payer: MEDICAID

## 2023-05-04 VITALS
SYSTOLIC BLOOD PRESSURE: 123 MMHG | TEMPERATURE: 100.8 F | OXYGEN SATURATION: 99 % | HEART RATE: 110 BPM | RESPIRATION RATE: 18 BRPM | DIASTOLIC BLOOD PRESSURE: 83 MMHG

## 2023-05-04 DIAGNOSIS — J02.9 VIRAL PHARYNGITIS: Primary | ICD-10-CM

## 2023-05-04 LAB — DEPRECATED S PYO AG THROAT QL EIA: NEGATIVE

## 2023-05-04 PROCEDURE — 87880 STREP A ASSAY W/OPTIC: CPT

## 2023-05-04 PROCEDURE — 99283 EMERGENCY DEPT VISIT LOW MDM: CPT

## 2023-05-04 PROCEDURE — 74011250637 HC RX REV CODE- 250/637: Performed by: STUDENT IN AN ORGANIZED HEALTH CARE EDUCATION/TRAINING PROGRAM

## 2023-05-04 PROCEDURE — 87070 CULTURE OTHR SPECIMN AEROBIC: CPT

## 2023-05-04 RX ORDER — DEXAMETHASONE SODIUM PHOSPHATE 4 MG/ML
10 INJECTION, SOLUTION INTRA-ARTICULAR; INTRALESIONAL; INTRAMUSCULAR; INTRAVENOUS; SOFT TISSUE
Status: COMPLETED | OUTPATIENT
Start: 2023-05-04 | End: 2023-05-04

## 2023-05-04 RX ADMIN — DEXAMETHASONE SODIUM PHOSPHATE 10 MG: 4 INJECTION, SOLUTION INTRAMUSCULAR; INTRAVENOUS at 07:34

## 2023-05-04 RX ADMIN — ACETAMINOPHEN 1000 MG: 160 SOLUTION ORAL at 07:34

## 2023-05-06 LAB
BACTERIA SPEC CULT: NORMAL
SERVICE CMNT-IMP: NORMAL

## 2023-05-22 ENCOUNTER — OFFICE VISIT (OUTPATIENT)
Age: 33
End: 2023-05-22
Payer: MEDICAID

## 2023-05-22 VITALS
BODY MASS INDEX: 36.28 KG/M2 | DIASTOLIC BLOOD PRESSURE: 70 MMHG | RESPIRATION RATE: 18 BRPM | TEMPERATURE: 97 F | HEIGHT: 68 IN | WEIGHT: 239.4 LBS | HEART RATE: 67 BPM | SYSTOLIC BLOOD PRESSURE: 105 MMHG | OXYGEN SATURATION: 98 %

## 2023-05-22 DIAGNOSIS — M54.42 CHRONIC MIDLINE LOW BACK PAIN WITH LEFT-SIDED SCIATICA: Primary | ICD-10-CM

## 2023-05-22 DIAGNOSIS — L73.9 FOLLICULITIS: ICD-10-CM

## 2023-05-22 DIAGNOSIS — G89.29 CHRONIC MIDLINE LOW BACK PAIN WITH LEFT-SIDED SCIATICA: Primary | ICD-10-CM

## 2023-05-22 PROCEDURE — 99214 OFFICE O/P EST MOD 30 MIN: CPT | Performed by: INTERNAL MEDICINE

## 2023-05-22 RX ORDER — CLINDAMYCIN PHOSPHATE 10 MG/G
GEL TOPICAL
Qty: 1 EACH | Refills: 0 | Status: SHIPPED | OUTPATIENT
Start: 2023-05-22 | End: 2023-05-29

## 2023-05-22 RX ORDER — TIZANIDINE HYDROCHLORIDE 4 MG/1
CAPSULE, GELATIN COATED ORAL 3 TIMES DAILY
COMMUNITY
Start: 2023-03-15

## 2023-05-22 RX ORDER — GABAPENTIN 300 MG/1
CAPSULE ORAL
COMMUNITY
Start: 2023-03-15 | End: 2023-05-22 | Stop reason: ALTCHOICE

## 2023-05-22 RX ORDER — PREGABALIN 100 MG/1
100 CAPSULE ORAL 2 TIMES DAILY
Qty: 60 CAPSULE | Refills: 0 | Status: SHIPPED | OUTPATIENT
Start: 2023-05-22 | End: 2023-06-21

## 2023-05-22 SDOH — ECONOMIC STABILITY: FOOD INSECURITY: WITHIN THE PAST 12 MONTHS, THE FOOD YOU BOUGHT JUST DIDN'T LAST AND YOU DIDN'T HAVE MONEY TO GET MORE.: NEVER TRUE

## 2023-05-22 SDOH — ECONOMIC STABILITY: FOOD INSECURITY: WITHIN THE PAST 12 MONTHS, YOU WORRIED THAT YOUR FOOD WOULD RUN OUT BEFORE YOU GOT MONEY TO BUY MORE.: NEVER TRUE

## 2023-05-22 ASSESSMENT — PATIENT HEALTH QUESTIONNAIRE - PHQ9
1. LITTLE INTEREST OR PLEASURE IN DOING THINGS: 0
SUM OF ALL RESPONSES TO PHQ QUESTIONS 1-9: 0
SUM OF ALL RESPONSES TO PHQ9 QUESTIONS 1 & 2: 0
2. FEELING DOWN, DEPRESSED OR HOPELESS: 0
SUM OF ALL RESPONSES TO PHQ QUESTIONS 1-9: 0

## 2023-05-22 ASSESSMENT — SOCIAL DETERMINANTS OF HEALTH (SDOH): HOW HARD IS IT FOR YOU TO PAY FOR THE VERY BASICS LIKE FOOD, HOUSING, MEDICAL CARE, AND HEATING?: NOT HARD AT ALL

## 2023-05-22 NOTE — PROGRESS NOTES
02/13/2023    CO2 24 02/13/2023    LABA1C 5.4 02/13/2023     Lab Results   Component Value Date    LDLCALC 82 02/13/2023     No results found for: PSA, PSADIA     Assessment and Plan:     1. Chronic midline low back pain with left-sided sciatica  Stop gabapentin not working well and causing sedation   Completed PT  - pregabalin (LYRICA) 100 MG capsule; Take 1 capsule by mouth 2 times daily for 30 days. Max Daily Amount: 200 mg  Dispense: 60 capsule; Refill: 0  Continue to see back specialist   - clindamycin (CLEOCIN-T) 1 % gel; Apply topically 2 times daily. Dispense: 1 each; Refill: 0      No follow-ups on file.      Camille Hernandez MD

## 2023-05-22 NOTE — PROGRESS NOTES
1. \"Have you been to the ER, urgent care clinic since your last visit? Hospitalized since your last visit? yes, Er, [facility], lower back pain. See chart. 2. \"Have you seen or consulted any other health care providers outside of the [address] [place] [address] since your last visit?no    3. For patients aged 39-70: Has the patient had a colonoscopy / FIT/ Cologuard? NA - based on age      If the patient is female:    4. For patients aged 41-77: Has the patient had a mammogram within the past 2 years? NA - based on age or sex      11. For patients aged 21-65: Has the patient had a pap smear?  No

## 2023-07-24 ENCOUNTER — TELEPHONE (OUTPATIENT)
Age: 33
End: 2023-07-24

## 2023-09-03 NOTE — H&P
OB History & Physical    Name: Alejandra Amor MRN: [id number]  SSN: [id number]    YOB: 1990  Age: 34 y.o. Sex: female      Subjective:     Reason for Admission:  Pregnancy and nausea/vomiting    History of Present Illness: Luann Jonas is a 34 y.o.  female with an estimated gestational age of 31w5d with Estimated Date of Delivery: 19. Patient complains of episodes of nausea and vomiting that occurred after she had a verbal argument with the [facility]. She also reports some abdominal cramping that occurred with the vomiting. She denies any injury. She denies FSC, contractions, bleeding, ROM. She reports not feeling the baby moving much today, but has felt more since arrival L&D. She reports no more nausea or vomiting since arrival L&D. OB History        1    Para        Term                AB        Living           SAB        TAB        Ectopic        Molar        Multiple        Live Births                  Past Medical History:   Diagnosis Date    Costochondritis      Past Surgical History:   Procedure Laterality Date    HX ORTHOPAEDIC      Right fourth finger, \"Digit widgit\"     Social History     Occupational History    Not on file   Tobacco Use    Smoking status: Former Smoker    Smokeless tobacco: Never Used    Tobacco comment: \"only when I drink\"   Substance and Sexual Activity    Alcohol use: No     Frequency: Never     Comment: Occasionally    Drug use: No    Sexual activity: Never     Family History   Family history unknown: Yes       Allergies   Allergen Reactions    Motrin [Ibuprofen] Hives     Prior to Admission medications    Medication Sig Start Date End Date Taking? Authorizing Provider   PNV No12-Iron-[id number]-[id number] 29 mg iron-1 mg -50 mg CPKD Take 1 Tab by mouth daily. Indications: pregnancy   Yes Provider, Historical   amoxicillin 500 mg tab Take 500 mg by mouth three (3) times daily.  18   Jorgito Benz MD        Review of Systems:  General: Denies fever, sweats, chills  CV: Denies CP, palpitations  Resp: Denies SOB, cough  GI: Denies nausea, vomiting, diarrhea  : Denies dysura, abnormal frequency, hematuria  Neuro: Denies HA, visual disturbance  All other systems reviewed and are negative    Objective:     Vitals:    Vitals:    19   BP: 131/87  155/77 111/89   Pulse: (!) 102  81 78   Resp: 20  18 18   Temp: 100 °F (37.8 °C)   98.5 °F (36.9 °C)   SpO2: 99%  97%    Weight:  93.9 kg (207 lb)     Height:  5' 7\" (1.702 m)        Temp (24hrs), Av.3 °F (37.4 °C), Min:98.5 °F (36.9 °C), Max:100 °F (37.8 °C)    BP  Min: 111/89  Max: 155/77     Physical Exam  General: in NAD  HEENT: normocephalic  Back: no CVAT  Abdomen: Gravid, soft, nontender, no abnormal masses, rebound, rigidity, guarding  Fundus: soft, nontender  Extremities: no abnormal cyanosis, clubbing, edema  Skin: warm, dry, no abnormal lesions noted  Neurological: A&O X 3  Pelvic:Cervical Exam: not checked  Uterine Activity: no contractions detected  Membranes: no gross evidence of ROM  Fetal Heart Rate: adequate variability and reactivity; no significant abnormal decelerations    Labs:   Recent Results (from the past 24 hour(s))   CBC WITH AUTOMATED DIFF    Collection Time: 19  8:24 PM   Result Value Ref Range    WBC 9.9 3.6 - 11.0 K/uL    RBC 4.14 3.80 - 5.20 M/uL    HGB 11.3 (L) 11.5 - 16.0 g/dL    HCT 34.4 (L) 35.0 - 47.0 %    MCV 83.1 80.0 - 99.0 FL    MCH 27.3 26.0 - 34.0 PG    MCHC 32.8 30.0 - 36.5 g/dL    RDW 12.2 11.5 - 14.5 %    PLATELET 003 602 - 298 K/uL    MPV 10.0 8.9 - 12.9 FL    NRBC 0.0 0  WBC    ABSOLUTE NRBC 0.00 0.00 - 0.01 K/uL    NEUTROPHILS 71 32 - 75 %    LYMPHOCYTES 18 12 - 49 %    MONOCYTES 9 5 - 13 %    EOSINOPHILS 1 0 - 7 %    BASOPHILS 0 0 - 1 %    IMMATURE GRANULOCYTES 1 (H) 0.0 - 0.5 %    ABS. NEUTROPHILS 7.1 1.8 - 8.0 K/UL    ABS. LYMPHOCYTES 1.8 0.8 - 3.5 K/UL    ABS.  MONOCYTES 0.8 0.0 - 1.0 K/UL    ABS. EOSINOPHILS 0.1 0.0 - 0.4 K/UL    ABS. BASOPHILS 0.0 0.0 - 0.1 K/UL    ABS. IMM. GRANS. 0.1 (H) 0.00 - 0.04 K/UL    DF AUTOMATED     METABOLIC PANEL, COMPREHENSIVE    Collection Time: 05/26/19  8:24 PM   Result Value Ref Range    Sodium 139 136 - 145 mmol/L    Potassium 3.6 3.5 - 5.1 mmol/L    Chloride 108 97 - 108 mmol/L    CO2 23 21 - 32 mmol/L    Anion gap 8 5 - 15 mmol/L    Glucose 85 65 - 100 mg/dL    BUN 8 6 - 20 MG/DL    Creatinine 0.49 (L) 0.55 - 1.02 MG/DL    BUN/Creatinine ratio 16 12 - 20      GFR est AA >60 >60 ml/min/1.73m2    GFR est non-AA >60 >60 ml/min/1.73m2    Calcium 8.4 (L) 8.5 - 10.1 MG/DL    Bilirubin, total 0.4 0.2 - 1.0 MG/DL    ALT (SGPT) 25 12 - 78 U/L    AST (SGOT) 17 15 - 37 U/L    Alk. phosphatase 64 45 - 117 U/L    Protein, total 6.8 6.4 - 8.2 g/dL    Albumin 2.9 (L) 3.5 - 5.0 g/dL    Globulin 3.9 2.0 - 4.0 g/dL    A-G Ratio 0.7 (L) 1.1 - 2.2     URINALYSIS W/ REFLEX CULTURE    Collection Time: 05/26/19  8:24 PM   Result Value Ref Range    Color YELLOW/STRAW      Appearance CLOUDY (A) CLEAR      Specific gravity 1.024 1.003 - 1.030      pH (UA) 6.5 5.0 - 8.0      Protein NEGATIVE  NEG mg/dL    Glucose NEGATIVE  NEG mg/dL    Ketone NEGATIVE  NEG mg/dL    Bilirubin NEGATIVE  NEG      Blood NEGATIVE  NEG      Urobilinogen 1.0 0.2 - 1.0 EU/dL    Nitrites NEGATIVE  NEG      Leukocyte Esterase MODERATE (A) NEG      WBC 5-10 0 - 4 /hpf    RBC 0-5 0 - 5 /hpf    Epithelial cells MODERATE (A) FEW /lpf    Bacteria 2+ (A) NEG /hpf    UA:UC IF INDICATED URINE CULTURE ORDERED (A) CNI     PROTEIN/CREATININE RATIO, URINE    Collection Time: 05/26/19  8:24 PM   Result Value Ref Range    Protein, urine random 16 (H) 0.0 - 11.9 mg/dL    Creatinine, urine 188.00 mg/dL    Protein/Creat. urine Ratio 0.1       Assessment and Plan:     27 weeks, third trimester nausea/vomiting, resolved.   Possible gestational hypertension    Patient was observed over 2 hours, no episodes of NV occurred. She had 2 mild range BPs, then normal BPs subsequently. She states she had elevated BP at initial OB visit, none predating pregnancy or since. Lab evaluation is negative. Also a temp of 100 resolved as well. She is discharged home, follow-up scheduled in the office on Wednesday. Precautions discussed; questions answered.     Time spent with patient 40 minutes, greater than 50% counseling and coordination of care    Signed By:  Alfredo Painter MD     May 26, 2019 no

## 2023-09-08 ENCOUNTER — OFFICE VISIT (OUTPATIENT)
Age: 33
End: 2023-09-08
Payer: MEDICAID

## 2023-09-08 VITALS
RESPIRATION RATE: 18 BRPM | BODY MASS INDEX: 37.28 KG/M2 | SYSTOLIC BLOOD PRESSURE: 117 MMHG | OXYGEN SATURATION: 99 % | TEMPERATURE: 97.8 F | HEART RATE: 94 BPM | WEIGHT: 246 LBS | HEIGHT: 68 IN | DIASTOLIC BLOOD PRESSURE: 83 MMHG

## 2023-09-08 DIAGNOSIS — N89.8 VAGINAL DISCHARGE: ICD-10-CM

## 2023-09-08 DIAGNOSIS — Z01.419 ENCOUNTER FOR WELL WOMAN EXAM: Primary | ICD-10-CM

## 2023-09-08 DIAGNOSIS — N92.0 MENORRHAGIA WITH REGULAR CYCLE: ICD-10-CM

## 2023-09-08 DIAGNOSIS — Z11.3 SCREENING EXAMINATION FOR STD (SEXUALLY TRANSMITTED DISEASE): ICD-10-CM

## 2023-09-08 PROCEDURE — 99214 OFFICE O/P EST MOD 30 MIN: CPT | Performed by: INTERNAL MEDICINE

## 2023-09-08 SDOH — ECONOMIC STABILITY: INCOME INSECURITY: HOW HARD IS IT FOR YOU TO PAY FOR THE VERY BASICS LIKE FOOD, HOUSING, MEDICAL CARE, AND HEATING?: NOT HARD AT ALL

## 2023-09-08 SDOH — ECONOMIC STABILITY: FOOD INSECURITY: WITHIN THE PAST 12 MONTHS, THE FOOD YOU BOUGHT JUST DIDN'T LAST AND YOU DIDN'T HAVE MONEY TO GET MORE.: NEVER TRUE

## 2023-09-08 SDOH — ECONOMIC STABILITY: HOUSING INSECURITY
IN THE LAST 12 MONTHS, WAS THERE A TIME WHEN YOU DID NOT HAVE A STEADY PLACE TO SLEEP OR SLEPT IN A SHELTER (INCLUDING NOW)?: NO

## 2023-09-08 SDOH — ECONOMIC STABILITY: FOOD INSECURITY: WITHIN THE PAST 12 MONTHS, YOU WORRIED THAT YOUR FOOD WOULD RUN OUT BEFORE YOU GOT MONEY TO BUY MORE.: NEVER TRUE

## 2023-09-08 ASSESSMENT — PATIENT HEALTH QUESTIONNAIRE - PHQ9
SUM OF ALL RESPONSES TO PHQ9 QUESTIONS 1 & 2: 0
SUM OF ALL RESPONSES TO PHQ QUESTIONS 1-9: 0
2. FEELING DOWN, DEPRESSED OR HOPELESS: 0
SUM OF ALL RESPONSES TO PHQ QUESTIONS 1-9: 0
SUM OF ALL RESPONSES TO PHQ QUESTIONS 1-9: 0
1. LITTLE INTEREST OR PLEASURE IN DOING THINGS: 0
SUM OF ALL RESPONSES TO PHQ QUESTIONS 1-9: 0

## 2023-09-09 LAB
HBV SURFACE AG SERPL QL IA: NEGATIVE
HIV 1+2 AB+HIV1 P24 AG SERPL QL IA: NON REACTIVE
TREPONEMA PALLIDUM IGG+IGM AB [PRESENCE] IN SERUM OR PLASMA BY IMMUNOASSAY: NON REACTIVE

## 2023-09-11 LAB
C TRACH RRNA SPEC QL NAA+PROBE: NEGATIVE
N GONORRHOEA RRNA SPEC QL NAA+PROBE: NEGATIVE
SPECIMEN SOURCE: NORMAL

## 2023-09-13 LAB
CYTOLOGIST CVX/VAG CYTO: ABNORMAL
CYTOLOGY CVX/VAG DOC CYTO: ABNORMAL
CYTOLOGY CVX/VAG DOC THIN PREP: ABNORMAL
DX ICD CODE: ABNORMAL
DX ICD CODE: ABNORMAL
HPV GENOTYPE REFLEX: ABNORMAL
HPV I/H RISK 4 DNA CVX QL PROBE+SIG AMP: NEGATIVE
Lab: ABNORMAL
OTHER STN SPEC: ABNORMAL
PATHOLOGIST CVX/VAG CYTO: ABNORMAL
STAT OF ADQ CVX/VAG CYTO-IMP: ABNORMAL

## 2023-09-15 LAB
A VAGINAE DNA VAG QL NAA+PROBE: ABNORMAL SCORE
BVAB2 DNA VAG QL NAA+PROBE: ABNORMAL SCORE
C ALBICANS DNA VAG QL NAA+PROBE: POSITIVE
C GLABRATA DNA VAG QL NAA+PROBE: NEGATIVE
C KRUSEI DNA VAG QL NAA+PROBE: NEGATIVE
C LUSITANIAE DNA VAG QL NAA+PROBE: NEGATIVE
C TRACH DNA VAG QL NAA+PROBE: NEGATIVE
CANDIDA DNA VAG QL NAA+PROBE: NEGATIVE
MEGA1 DNA VAG QL NAA+PROBE: ABNORMAL SCORE
N GONORRHOEA DNA VAG QL NAA+PROBE: NEGATIVE
T VAGINALIS DNA VAG QL NAA+PROBE: NEGATIVE

## 2023-09-17 PROBLEM — B96.89 BV (BACTERIAL VAGINOSIS): Status: ACTIVE | Noted: 2023-09-17

## 2023-09-17 PROBLEM — R87.610 ASCUS OF CERVIX WITH NEGATIVE HIGH RISK HPV: Status: ACTIVE | Noted: 2023-09-17

## 2023-09-17 PROBLEM — N76.0 BV (BACTERIAL VAGINOSIS): Status: ACTIVE | Noted: 2023-09-17

## 2023-09-22 ENCOUNTER — PATIENT MESSAGE (OUTPATIENT)
Age: 33
End: 2023-09-22

## 2023-09-22 DIAGNOSIS — L20.84 INTRINSIC ECZEMA: Primary | ICD-10-CM

## 2023-09-22 RX ORDER — TRIAMCINOLONE ACETONIDE 0.25 MG/G
OINTMENT TOPICAL
Qty: 1 EACH | Refills: 1 | Status: SHIPPED | OUTPATIENT
Start: 2023-09-22 | End: 2023-09-29

## 2023-09-25 DIAGNOSIS — L20.84 INTRINSIC ECZEMA: ICD-10-CM

## 2023-09-25 RX ORDER — TRIAMCINOLONE ACETONIDE 0.25 MG/G
OINTMENT TOPICAL
Qty: 1 EACH | Refills: 1 | Status: SHIPPED | OUTPATIENT
Start: 2023-09-25 | End: 2023-10-02

## 2023-09-25 NOTE — TELEPHONE ENCOUNTER
----- Message from [address] [place] [address].  Megan Carter sent at 9/23/2023  5:54 PM EDT -----  Regarding: richard  Contact: [phone]  Louana Primrose i went to  they never received

## 2023-10-03 NOTE — ED PROVIDER NOTES
EMERGENCY DEPARTMENT HISTORY AND PHYSICAL EXAM      Date: 6/24/2021  Patient Name: Fina Graves    History of Presenting Illness     Chief Complaint   Patient presents with    Abdominal Pain     pt seen here 2 days ago for similar sx and sent home on cefdinir and pyridium. she continues with lower abdominal pain and n/v, especially after dosing with antibx.  Vomiting       History Provided By: Patient    HPI: Fina Graves, 31 y.o. female  presents to the ED with cc of abdominal pain. Patient was seen here 2 days ago for abdominal pain in the left lower quadrant. She had a CT scan that was unremarkable. Urinalysis indicated likely infection. Labs unremarkable. Patient was placed on cefdinir and Pyridium and discharged home. She states she feels like her symptoms are getting worse. She has worsening pain. She has been nauseous. No diarrhea or constipation. No vaginal discharge or bleeding. Her last menstrual period was about a week ago. She has not had any fevers or chills. Past History     Past Medical History:  Past Medical History:   Diagnosis Date    Anemia     Costochondritis        Past Surgical History:  Past Surgical History:   Procedure Laterality Date    HX ORTHOPAEDIC      Right fourth finger, \"Digit widgit\"       Medications:  No current facility-administered medications on file prior to encounter. Current Outpatient Medications on File Prior to Encounter   Medication Sig Dispense Refill    cefdinir (OMNICEF) 300 mg capsule Take 1 Capsule by mouth two (2) times a day for 7 days. 14 Capsule 0    phenazopyridine (PYRIDIUM) 100 mg tablet Take 2 Tablets by mouth three (3) times daily for 3 days. 18 Tablet 0    ondansetron (ZOFRAN ODT) 4 mg disintegrating tablet Take 1 Tab by mouth every eight (8) hours as needed for Nausea or Vomiting.  21 Tab 0       Family History:  Family History   Family history unknown: Yes       Social History:  Social History     Tobacco Use    Smoking status: Former Smoker    Smokeless tobacco: Never Used    Tobacco comment: \"only when I drink\"   Substance Use Topics    Alcohol use: No     Comment: Occasionally    Drug use: No       Allergies: Allergies   Allergen Reactions    Motrin [Ibuprofen] Hives       All the above components of the past  history are auto-populated from the electronic record. They have been reviewed and the patient has been interviewed for any pertinent past history that pertains to the patient's chief complaint and reason for visit. Not all pre-populated components may be accurate at the time this note was generated. Review of Systems   Review of Systems   Constitutional: Negative for chills and fever. HENT: Negative for congestion, ear pain, rhinorrhea, sore throat and trouble swallowing. Eyes: Negative for visual disturbance. Respiratory: Negative for cough, chest tightness and shortness of breath. Cardiovascular: Negative for chest pain and palpitations. Gastrointestinal: Positive for abdominal pain, nausea and vomiting. Negative for blood in stool, constipation and diarrhea. Genitourinary: Negative for decreased urine volume, difficulty urinating, dysuria and frequency. Musculoskeletal: Negative for back pain and neck pain. Skin: Negative for color change and rash. Neurological: Negative for dizziness, weakness, light-headedness and headaches. Physical Exam   Physical Exam  Vitals and nursing note reviewed. Constitutional:       General: She is not in acute distress. Appearance: She is well-developed. She is not ill-appearing. HENT:      Head: Normocephalic. Eyes:      Conjunctiva/sclera: Conjunctivae normal.   Cardiovascular:      Rate and Rhythm: Normal rate and regular rhythm. Pulmonary:      Effort: Pulmonary effort is normal. No accessory muscle usage or respiratory distress. Abdominal:      General: There is no distension. Tenderness:  There is abdominal tenderness in the left lower quadrant. There is no guarding or rebound. Musculoskeletal:      Cervical back: Normal range of motion. Skin:     General: Skin is warm and dry. Neurological:      Mental Status: She is alert and oriented to person, place, and time. Due to the COVID-19 pandemic, in order to reduce the spread and transmission of the virus, some basic elements of the physical exam have been deferred to reduce direct or close contact with the patient unless they are deemed to be absolutely necessary, regardless of whether the virus is highly suspected or not. Diagnostic Study Results     Labs -     Recent Results (from the past 24 hour(s))   CBC WITH AUTOMATED DIFF    Collection Time: 06/24/21  2:59 PM   Result Value Ref Range    WBC 13.6 (H) 3.6 - 11.0 K/uL    RBC 4.75 3.80 - 5.20 M/uL    HGB 12.8 11.5 - 16.0 g/dL    HCT 40.2 35.0 - 47.0 %    MCV 84.6 80.0 - 99.0 FL    MCH 26.9 26.0 - 34.0 PG    MCHC 31.8 30.0 - 36.5 g/dL    RDW 12.7 11.5 - 14.5 %    PLATELET 399 104 - 902 K/uL    MPV 10.0 8.9 - 12.9 FL    NRBC 0.0 0  WBC    ABSOLUTE NRBC 0.00 0.00 - 0.01 K/uL    NEUTROPHILS 73 32 - 75 %    LYMPHOCYTES 20 12 - 49 %    MONOCYTES 6 5 - 13 %    EOSINOPHILS 1 0 - 7 %    BASOPHILS 0 0 - 1 %    IMMATURE GRANULOCYTES 0 0.0 - 0.5 %    ABS. NEUTROPHILS 9.9 (H) 1.8 - 8.0 K/UL    ABS. LYMPHOCYTES 2.7 0.8 - 3.5 K/UL    ABS. MONOCYTES 0.8 0.0 - 1.0 K/UL    ABS. EOSINOPHILS 0.1 0.0 - 0.4 K/UL    ABS. BASOPHILS 0.1 0.0 - 0.1 K/UL    ABS. IMM.  GRANS. 0.0 0.00 - 0.04 K/UL    DF AUTOMATED     METABOLIC PANEL, COMPREHENSIVE    Collection Time: 06/24/21  2:59 PM   Result Value Ref Range    Sodium 137 136 - 145 mmol/L    Potassium 4.0 3.5 - 5.1 mmol/L    Chloride 107 97 - 108 mmol/L    CO2 27 21 - 32 mmol/L    Anion gap 3 (L) 5 - 15 mmol/L    Glucose 95 65 - 100 mg/dL    BUN 7 6 - 20 MG/DL    Creatinine 0.86 0.55 - 1.02 MG/DL    BUN/Creatinine ratio 8 (L) 12 - 20      GFR est AA >60 >60 ml/min/1.73m2    GFR est non-AA >60 >60 ml/min/1.73m2    Calcium 8.9 8.5 - 10.1 MG/DL    Bilirubin, total 0.7 0.2 - 1.0 MG/DL    ALT (SGPT) 24 12 - 78 U/L    AST (SGOT) 14 (L) 15 - 37 U/L    Alk. phosphatase 78 45 - 117 U/L    Protein, total 7.8 6.4 - 8.2 g/dL    Albumin 3.7 3.5 - 5.0 g/dL    Globulin 4.1 (H) 2.0 - 4.0 g/dL    A-G Ratio 0.9 (L) 1.1 - 2.2     BETA HCG, QT    Collection Time: 06/24/21  2:59 PM   Result Value Ref Range    Beta HCG, QT <1 0 - 6 MIU/ML   URINALYSIS W/ REFLEX CULTURE    Collection Time: 06/24/21  3:23 PM    Specimen: Miscellaneous sample; Urine    Urine specimen   Result Value Ref Range    Color LEISA      Appearance CLOUDY (A) CLEAR      Specific gravity 1.009 1.003 - 1.030      pH (UA) 5.0 5.0 - 8.0      Protein Negative NEG mg/dL    Glucose Negative NEG mg/dL    Ketone TRACE (A) NEG mg/dL    Blood Negative NEG      Urobilinogen 2.0 (H) 0.2 - 1.0 EU/dL    Nitrites Positive (A) NEG      Leukocyte Esterase LARGE (A) NEG      WBC 20-50 0 - 4 /hpf    RBC 0-5 0 - 5 /hpf    Epithelial cells FEW FEW /lpf    Bacteria 1+ (A) NEG /hpf    UA:UC IF INDICATED URINE CULTURE ORDERED (A) CNI      Granular cast 0-2 (A) NEG /lpf   BILIRUBIN, CONFIRM    Collection Time: 06/24/21  3:23 PM   Result Value Ref Range    Bilirubin UA, confirm Negative NEG         Radiologic Studies -   US PELV NON OBS   Final Result   Negative pelvic and transvaginal ultrasound. US TRANSVAGINAL   Final Result   Negative pelvic and transvaginal ultrasound. CT Results  (Last 48 hours)    None        CXR Results  (Last 48 hours)    None            Medical Decision Making     I reviewed the vital signs, available nursing notes, past medical history, past surgical history, family history and social history. Vital Signs-I have reviewed the vital signs that have been made available during the patient's emergency department visit.   The vital signs auto-populated below are obtained mostly by electronic means through monitoring devices that have been downloaded into the patient's chart by the nursing staff. Some vital signs are not downloaded into the chart until after the patient has been discharged and this note has been completed, therefore some vital signs may not be available to the physician for review prior to patient's discharge or admission. The physician has reviewed the patient's triage vital signs, monitored the electronic monitoring devices remotely for any significant vital sign abnormalities, and have reviewed vital signs prior to discharge. Some vital signs reviewed at bedside or remotely utilizing electronic monitoring devices may be different than the vital signs downloaded into the electronic medical record. Some vital signs may be erroneous and inaccurate since they are obtained by electronic monitoring devices, and not all vital signs are verified for accuracy by nursing staff prior to downloading into the patient's chart. Patient Vitals for the past 24 hrs:   Temp Pulse Resp BP SpO2   06/24/21 1603 -- -- -- -- 99 %   06/24/21 1602 -- -- -- 117/80 100 %   06/24/21 1534 -- 85 -- (!) 132/90 100 %   06/24/21 1449 98.3 °F (36.8 °C) -- 14 123/83 100 %         Records Reviewed: Nursing notes for today's visit have been reviewed. I have also reviewed most recent medical records pertinent to today's complaints, if available in our medical record system. I have also reviewed all labs and imaging results from previous results in comparison to results obtained today. If an EKG was obtained today, it has been compared to previous EKGs, if available. If arriving via EMS, the EMS report has been reviewed if made available to us within the patient's time in the emergency department. Provider Notes (Medical Decision Making):   Patient presents 48 hours after being seen in the emergency department for worsening pain in her abdomen. Last visit she did have a CT of her abdomen and pelvis that was unremarkable.   I see no need in repeating this imaging modality. We will take a look of a pelvic ultrasound to look for any ovarian cysts or pelvic etiologies. She does not have any discharge or complaints there. Her urinalysis appears to indicate that she has infection but her urine culture was negative from 2 days ago. I would like for her to continue on the cefdinir. She may have some pyelonephritis I will give her some IV antibiotics before discharging her. ED Course:   Initial assessment performed. The patients presenting problems have been discussed, and they are in agreement with the care plan formulated and outlined with them. I have encouraged them to ask questions as they arise throughout their visit. Orders Placed This Encounter    CULTURE, URINE    US PELV NON OBS    US TRANSVAGINAL    URINALYSIS W/ REFLEX CULTURE    CBC WITH AUTOMATED DIFF    METABOLIC PANEL, COMPREHENSIVE    BETA HCG, QT    BILIRUBIN, CONFIRM    INSERT PERIPHERAL IV ONE TIME STAT    ketorolac (TORADOL) injection 15 mg    ondansetron (ZOFRAN) injection 4 mg    ondansetron (ZOFRAN) 4 mg/2 mL injection    cefTRIAXone (ROCEPHIN) 1 g in 0.9% sodium chloride (MBP/ADV) 50 mL MBP    morphine injection 2 mg    naproxen (NAPROSYN) 500 mg tablet       Procedures      Critical Care Time:   0    Disposition:  Discharge    The patient's emergency department evaluation is now complete. I have reviewed all labs, imaging, and pertinent information. I have discussed all results with the patient and/or family. Based on our evaluation today I do believe that the patient is safe to be discharged home. The patient has been provided with at home instructions that are pertinent to their complaint today, although these may not be specific to the exact diagnosis. I have reviewed the patient's home medications and attempted to reconcile if not already done so by pharmacy or nursing staff. I have discussed all new prescriptions with the patient.   The patient has been encouraged to follow-up with primary care doctor and/or specialist, and these have been discussed with the patient. The patient has been advised that they may return to the emergency department if they have any worsening symptoms and or new symptoms that are of concern to them. Verbal discharge instructions may have also been provided to the patient that may not be specifically contained in the written discharge instructions. The patient has been given opportunity to ask questions prior to discharge. PLAN:  1. Current Discharge Medication List      START taking these medications    Details   naproxen (NAPROSYN) 500 mg tablet Take 1 Tablet by mouth every twelve (12) hours as needed for Pain. Qty: 20 Tablet, Refills: 0  Start date: 6/24/2021           2. Follow-up Information     Follow up With Specialties Details Why Contact Info    [facility] EMERGENCY DEPT Emergency Medicine Go to  If symptoms worsen [address] [place] [address]  [phone]        Return to ED if worse     Diagnosis     Clinical Impression:   1.  Acute pyelonephritis Bexarotene Counseling:  I discussed with the patient the risks of bexarotene including but not limited to hair loss, dry lips/skin/eyes, liver abnormalities, hyperlipidemia, pancreatitis, depression/suicidal ideation, photosensitivity, drug rash/allergic reactions, hypothyroidism, anemia, leukopenia, infection, cataracts, and teratogenicity.  Patient understands that they will need regular blood tests to check lipid profile, liver function tests, white blood cell count, thyroid function tests and pregnancy test if applicable.

## 2023-12-02 ENCOUNTER — HOSPITAL ENCOUNTER (EMERGENCY)
Facility: HOSPITAL | Age: 33
Discharge: HOME | End: 2023-12-02
Attending: EMERGENCY MEDICINE
Payer: MEDICAID

## 2023-12-02 VITALS
SYSTOLIC BLOOD PRESSURE: 131 MMHG | OXYGEN SATURATION: 100 % | WEIGHT: 250 LBS | RESPIRATION RATE: 16 BRPM | DIASTOLIC BLOOD PRESSURE: 78 MMHG | TEMPERATURE: 99.4 F | HEIGHT: 68 IN | BODY MASS INDEX: 37.89 KG/M2 | HEART RATE: 72 BPM

## 2023-12-02 DIAGNOSIS — K92.0 HEMATEMESIS WITH NAUSEA: ICD-10-CM

## 2023-12-02 DIAGNOSIS — R10.13 ABDOMINAL PAIN, EPIGASTRIC: Primary | ICD-10-CM

## 2023-12-02 LAB
ALBUMIN SERPL-MCNC: 3.7 G/DL (ref 3.5–5)
ALBUMIN/GLOB SERPL: 1 (ref 1.1–2.2)
ALP SERPL-CCNC: 76 U/L (ref 45–117)
ALT SERPL-CCNC: 38 U/L (ref 12–78)
ANION GAP SERPL CALC-SCNC: 9 MMOL/L (ref 5–15)
APPEARANCE UR: CLEAR
AST SERPL-CCNC: 23 U/L (ref 15–37)
BACTERIA URNS QL MICRO: NEGATIVE /HPF
BASOPHILS # BLD: 0.1 K/UL (ref 0–0.1)
BASOPHILS NFR BLD: 0 % (ref 0–1)
BILIRUB SERPL-MCNC: 0.8 MG/DL (ref 0.2–1)
BILIRUB UR QL CFM: NEGATIVE
BUN SERPL-MCNC: 10 MG/DL (ref 6–20)
BUN/CREAT SERPL: 10 (ref 12–20)
CALCIUM SERPL-MCNC: 8.7 MG/DL (ref 8.5–10.1)
CHLORIDE SERPL-SCNC: 104 MMOL/L (ref 97–108)
CO2 SERPL-SCNC: 24 MMOL/L (ref 21–32)
COLOR UR: ABNORMAL
CREAT SERPL-MCNC: 0.96 MG/DL (ref 0.55–1.02)
DIFFERENTIAL METHOD BLD: ABNORMAL
EOSINOPHIL # BLD: 0.1 K/UL (ref 0–0.4)
EOSINOPHIL NFR BLD: 0 % (ref 0–7)
EPITH CASTS URNS QL MICRO: ABNORMAL /LPF
ERYTHROCYTE [DISTWIDTH] IN BLOOD BY AUTOMATED COUNT: 12.6 % (ref 11.5–14.5)
GLOBULIN SER CALC-MCNC: 3.7 G/DL (ref 2–4)
GLUCOSE SERPL-MCNC: 104 MG/DL (ref 65–100)
GLUCOSE UR STRIP.AUTO-MCNC: NEGATIVE MG/DL
HCG UR QL: NEGATIVE
HCT VFR BLD AUTO: 39.9 % (ref 35–47)
HGB BLD-MCNC: 13 G/DL (ref 11.5–16)
HGB UR QL STRIP: ABNORMAL
IMM GRANULOCYTES # BLD AUTO: 0 K/UL (ref 0–0.04)
IMM GRANULOCYTES NFR BLD AUTO: 0 % (ref 0–0.5)
KETONES UR QL STRIP.AUTO: NEGATIVE MG/DL
LEUKOCYTE ESTERASE UR QL STRIP.AUTO: NEGATIVE
LIPASE SERPL-CCNC: 22 U/L (ref 13–75)
LYMPHOCYTES # BLD: 2.9 K/UL (ref 0.8–3.5)
LYMPHOCYTES NFR BLD: 24 % (ref 12–49)
MCH RBC QN AUTO: 27 PG (ref 26–34)
MCHC RBC AUTO-ENTMCNC: 32.6 G/DL (ref 30–36.5)
MCV RBC AUTO: 82.8 FL (ref 80–99)
MONOCYTES # BLD: 0.8 K/UL (ref 0–1)
MONOCYTES NFR BLD: 7 % (ref 5–13)
MUCOUS THREADS URNS QL MICRO: ABNORMAL /LPF
NEUTS SEG # BLD: 8.3 K/UL (ref 1.8–8)
NEUTS SEG NFR BLD: 69 % (ref 32–75)
NITRITE UR QL STRIP.AUTO: NEGATIVE
NRBC # BLD: 0 K/UL (ref 0–0.01)
NRBC BLD-RTO: 0 PER 100 WBC
PH UR STRIP: 6 (ref 5–8)
PLATELET # BLD AUTO: 288 K/UL (ref 150–400)
PMV BLD AUTO: 9.7 FL (ref 8.9–12.9)
POTASSIUM SERPL-SCNC: 3.5 MMOL/L (ref 3.5–5.1)
PROT SERPL-MCNC: 7.4 G/DL (ref 6.4–8.2)
PROT UR STRIP-MCNC: 30 MG/DL
RBC # BLD AUTO: 4.82 M/UL (ref 3.8–5.2)
RBC #/AREA URNS HPF: ABNORMAL /HPF (ref 0–5)
SODIUM SERPL-SCNC: 137 MMOL/L (ref 136–145)
SP GR UR REFRACTOMETRY: >1.005 (ref 1–1.03)
UROBILINOGEN UR QL STRIP.AUTO: 1 EU/DL (ref 0.2–1)
WBC # BLD AUTO: 12.1 K/UL (ref 3.6–11)
WBC URNS QL MICRO: ABNORMAL /HPF (ref 0–4)

## 2023-12-02 PROCEDURE — 96361 HYDRATE IV INFUSION ADD-ON: CPT

## 2023-12-02 PROCEDURE — 83690 ASSAY OF LIPASE: CPT

## 2023-12-02 PROCEDURE — C9113 INJ PANTOPRAZOLE SODIUM, VIA: HCPCS | Performed by: NURSE PRACTITIONER

## 2023-12-02 PROCEDURE — 99284 EMERGENCY DEPT VISIT MOD MDM: CPT

## 2023-12-02 PROCEDURE — 36415 COLL VENOUS BLD VENIPUNCTURE: CPT

## 2023-12-02 PROCEDURE — 96374 THER/PROPH/DIAG INJ IV PUSH: CPT

## 2023-12-02 PROCEDURE — 81001 URINALYSIS AUTO W/SCOPE: CPT

## 2023-12-02 PROCEDURE — 6360000002 HC RX W HCPCS: Performed by: NURSE PRACTITIONER

## 2023-12-02 PROCEDURE — A4216 STERILE WATER/SALINE, 10 ML: HCPCS | Performed by: NURSE PRACTITIONER

## 2023-12-02 PROCEDURE — 2580000003 HC RX 258: Performed by: NURSE PRACTITIONER

## 2023-12-02 PROCEDURE — 80053 COMPREHEN METABOLIC PANEL: CPT

## 2023-12-02 PROCEDURE — 96375 TX/PRO/DX INJ NEW DRUG ADDON: CPT

## 2023-12-02 PROCEDURE — 81025 URINE PREGNANCY TEST: CPT

## 2023-12-02 PROCEDURE — 85025 COMPLETE CBC W/AUTO DIFF WBC: CPT

## 2023-12-02 RX ORDER — PANTOPRAZOLE SODIUM 40 MG/1
40 TABLET, DELAYED RELEASE ORAL
Qty: 60 TABLET | Refills: 0 | Status: SHIPPED | OUTPATIENT
Start: 2023-12-02

## 2023-12-02 RX ORDER — ONDANSETRON 8 MG/1
4 TABLET, ORALLY DISINTEGRATING ORAL EVERY 8 HOURS PRN
Qty: 12 TABLET | Refills: 0 | Status: SHIPPED | OUTPATIENT
Start: 2023-12-02

## 2023-12-02 RX ORDER — 0.9 % SODIUM CHLORIDE 0.9 %
1000 INTRAVENOUS SOLUTION INTRAVENOUS ONCE
Status: COMPLETED | OUTPATIENT
Start: 2023-12-02 | End: 2023-12-02

## 2023-12-02 RX ORDER — ONDANSETRON 2 MG/ML
8 INJECTION INTRAMUSCULAR; INTRAVENOUS
Status: COMPLETED | OUTPATIENT
Start: 2023-12-02 | End: 2023-12-02

## 2023-12-02 RX ORDER — SUCRALFATE 1 G/1
1 TABLET ORAL 4 TIMES DAILY
Qty: 120 TABLET | Refills: 0 | Status: SHIPPED | OUTPATIENT
Start: 2023-12-02

## 2023-12-02 RX ADMIN — SODIUM CHLORIDE 40 MG: 9 INJECTION INTRAMUSCULAR; INTRAVENOUS; SUBCUTANEOUS at 13:08

## 2023-12-02 RX ADMIN — ONDANSETRON 8 MG: 2 INJECTION INTRAMUSCULAR; INTRAVENOUS at 13:08

## 2023-12-02 RX ADMIN — SODIUM CHLORIDE 1000 ML: 9 INJECTION, SOLUTION INTRAVENOUS at 13:07

## 2023-12-02 ASSESSMENT — PAIN SCALES - GENERAL
PAINLEVEL_OUTOF10: 7
PAINLEVEL_OUTOF10: 4
PAINLEVEL_OUTOF10: 2

## 2023-12-02 ASSESSMENT — PAIN DESCRIPTION - LOCATION
LOCATION: ABDOMEN

## 2023-12-02 ASSESSMENT — PAIN - FUNCTIONAL ASSESSMENT: PAIN_FUNCTIONAL_ASSESSMENT: 0-10

## 2023-12-02 ASSESSMENT — PAIN DESCRIPTION - DESCRIPTORS: DESCRIPTORS: SHARP

## 2023-12-02 ASSESSMENT — PAIN DESCRIPTION - ORIENTATION: ORIENTATION: LEFT;LOWER

## 2023-12-02 NOTE — DISCHARGE INSTRUCTIONS
Overall, your labs look normal.  I am glad to see that you have not had any recurrent vomiting of blood since you have been in the ER. I am not certain what is causing your symptoms, but it could be related to gastritis or peptic ulcer disease. I would like to start you on 2 medications (Carafate and pantoprazole) to lower the acid level in your stomach and help to allow the lining of it to heal quickly. I am hoping this helps your symptoms to go away within the next 3 to 7 days. Please follow-up very closely with gastroenterology, because you will likely need more medical testing and possibly a procedure called an endoscopy. If you develop any fevers, severe worsening pain, recurrent/heavy vomiting of blood, black tarry stool, please come back to the ER right away to be re-evaluated.

## 2023-12-02 NOTE — ED NOTES
PIV removed, tolerated well. Discharge instructions provided and verbalized understanding and need for follow-up. AOX4.       Britney Barrera RN  12/02/23 [id number]

## 2024-01-01 NOTE — ANESTHESIA PROCEDURE NOTES
Epidural Block    Start time: 8/17/2019 3:44 PM  End time: 8/17/2019 3:58 PM  Performed by: Kenny García MD  Authorized by: Kenny García MD     Pre-Procedure  Indication: labor epidural    Preanesthetic Checklist: risks and benefits discussed, site marked and timeout performed    Timeout Time: 15:44        Epidural:   Patient position:  Seated  Prep region:  Lumbar  Prep: Chlorhexidine    Location:  L3-4    Needle and Epidural Catheter:   Needle Type:  Tuohy  Needle Gauge:  17 G  Injection Technique:  Loss of resistance using air  Attempts:  1  Catheter Size:  19 G  Depth in Epidural Space (cm):  5  Events: no blood with aspiration, no cerebrospinal fluid with aspiration, no paresthesia and negative aspiration test    Test Dose:  Negative    Assessment:   Catheter Secured:  Tegaderm and tape  Insertion:  Uncomplicated  Patient tolerance:  Patient tolerated the procedure well with no immediate complications
0

## 2024-01-29 ENCOUNTER — APPOINTMENT (OUTPATIENT)
Facility: HOSPITAL | Age: 34
End: 2024-01-29
Payer: MEDICAID

## 2024-01-29 ENCOUNTER — HOSPITAL ENCOUNTER (EMERGENCY)
Facility: HOSPITAL | Age: 34
Discharge: HOME | End: 2024-01-29
Attending: EMERGENCY MEDICINE
Payer: MEDICAID

## 2024-01-29 VITALS
TEMPERATURE: 98.9 F | HEIGHT: 68 IN | SYSTOLIC BLOOD PRESSURE: 136 MMHG | BODY MASS INDEX: 38.04 KG/M2 | OXYGEN SATURATION: 98 % | DIASTOLIC BLOOD PRESSURE: 83 MMHG | HEART RATE: 96 BPM | WEIGHT: 251 LBS | RESPIRATION RATE: 18 BRPM

## 2024-01-29 DIAGNOSIS — J06.9 ACUTE UPPER RESPIRATORY INFECTION: Primary | ICD-10-CM

## 2024-01-29 LAB
FLUAV RNA SPEC QL NAA+PROBE: NOT DETECTED
FLUBV RNA SPEC QL NAA+PROBE: NOT DETECTED
SARS-COV-2 RNA RESP QL NAA+PROBE: NOT DETECTED

## 2024-01-29 PROCEDURE — 99284 EMERGENCY DEPT VISIT MOD MDM: CPT

## 2024-01-29 PROCEDURE — 87636 SARSCOV2 & INF A&B AMP PRB: CPT

## 2024-01-29 PROCEDURE — 6370000000 HC RX 637 (ALT 250 FOR IP): Performed by: EMERGENCY MEDICINE

## 2024-01-29 PROCEDURE — 71046 X-RAY EXAM CHEST 2 VIEWS: CPT

## 2024-01-29 RX ORDER — ONDANSETRON 4 MG/1
4 TABLET, ORALLY DISINTEGRATING ORAL 3 TIMES DAILY PRN
Qty: 21 TABLET | Refills: 0 | Status: SHIPPED | OUTPATIENT
Start: 2024-01-29

## 2024-01-29 RX ORDER — ONDANSETRON 4 MG/1
4 TABLET, ORALLY DISINTEGRATING ORAL ONCE
Status: COMPLETED | OUTPATIENT
Start: 2024-01-29 | End: 2024-01-29

## 2024-01-29 RX ORDER — BENZONATATE 100 MG/1
100 CAPSULE ORAL 2 TIMES DAILY PRN
Qty: 20 CAPSULE | Refills: 0 | Status: SHIPPED | OUTPATIENT
Start: 2024-01-29 | End: 2024-02-05

## 2024-01-29 RX ORDER — ALBUTEROL SULFATE 90 UG/1
2 AEROSOL, METERED RESPIRATORY (INHALATION) 4 TIMES DAILY PRN
Qty: 54 G | Refills: 0 | Status: SHIPPED | OUTPATIENT
Start: 2024-01-29

## 2024-01-29 RX ADMIN — ONDANSETRON 4 MG: 4 TABLET, ORALLY DISINTEGRATING ORAL at 10:31

## 2024-01-29 ASSESSMENT — PAIN DESCRIPTION - DESCRIPTORS: DESCRIPTORS: ACHING

## 2024-01-29 ASSESSMENT — LIFESTYLE VARIABLES
HOW MANY STANDARD DRINKS CONTAINING ALCOHOL DO YOU HAVE ON A TYPICAL DAY: PATIENT DOES NOT DRINK
HOW OFTEN DO YOU HAVE A DRINK CONTAINING ALCOHOL: MONTHLY OR LESS

## 2024-01-29 ASSESSMENT — PAIN DESCRIPTION - LOCATION: LOCATION: CHEST

## 2024-01-29 ASSESSMENT — PAIN DESCRIPTION - ORIENTATION: ORIENTATION: MID

## 2024-01-29 ASSESSMENT — PAIN SCALES - GENERAL: PAINLEVEL_OUTOF10: 9

## 2024-01-29 NOTE — ED NOTES
Discharge instructions were given to the patient by Idris Farah RN .     The patient left the Emergency Department ambulatory, alert and oriented and in no acute distress with 3 prescriptions. The patient was encouraged to call or return to the ED for worsening issues or problems and was encouraged to schedule a follow up appointment for continuing care.     The patient verbalized understanding of discharge instructions and prescriptions, all questions were answered. The patient has no further concerns at this time.

## 2024-01-29 NOTE — ED PROVIDER NOTES
[facility] EMERGENCY DEPT  EMERGENCY DEPARTMENT ENCOUNTER       Pt Name: Telma Cano  MRN: [id number]  Birthdate 1990  Date of evaluation: 1/29/2024  Provider: Alyson Mcneill MD   PCP: Unique Casper MD  Note Started: 9:19 AM EST 1/29/24     CHIEF COMPLAINT       Chief Complaint   Patient presents with    Headache    Nausea & Vomiting    Nasal Congestion               HISTORY OF PRESENT ILLNESS: 1 or more elements      History From: patient, History limited by: none     Telma Cano is a 33 y.o. female who presents with a cc of cough, congestion, runny nose, n/v x 4 day       Please See MDM for Additional Details of the HPI/PMH  Nursing Notes were all reviewed and agreed with or any disagreements were addressed in the HPI.     REVIEW OF SYSTEMS        Positives and Pertinent negatives as per HPI.    PAST HISTORY     Past Medical History:  Past Medical History:   Diagnosis Date    Anemia     ASCUS of cervix with negative high risk HPV 09/17/2023    Costochondritis        Past Surgical History:  Past Surgical History:   Procedure Laterality Date    ORTHOPEDIC SURGERY      Right fourth finger, \"Digit widgit\"       Family History:  Family History   Problem Relation Age of Onset    Diabetes Mother     Hypertension Father        Social History:  Social History     Tobacco Use    Smoking status: Former    Smokeless tobacco: Never   Substance Use Topics    Alcohol use: No    Drug use: No       Allergies:  Allergies   Allergen Reactions    Nsaids Hives    Ibuprofen Hives       CURRENT MEDICATIONS      Discharge Medication List as of 1/29/2024 10:56 AM        CONTINUE these medications which have NOT CHANGED    Details   !! ondansetron (ZOFRAN-ODT) 8 MG TBDP disintegrating tablet Take 0.5 tablets by mouth every 8 hours as needed for Nausea or Vomiting, Disp-12 tablet, R-0Normal      sucralfate (CARAFATE) 1 GM tablet Take 1 tablet by mouth 4 times daily, Disp-120 tablet, R-0Normal      pantoprazole (PROTONIX)

## 2024-01-29 NOTE — ED TRIAGE NOTES
Pt c/o nasal congestion over the weekend, this morning woke up with headache, nausea, vomiting, cough, sob and chest pain with cough. Works in a .

## 2024-02-28 ENCOUNTER — APPOINTMENT (OUTPATIENT)
Facility: HOSPITAL | Age: 34
End: 2024-02-28
Payer: MEDICAID

## 2024-02-28 ENCOUNTER — HOSPITAL ENCOUNTER (EMERGENCY)
Facility: HOSPITAL | Age: 34
Discharge: HOME | End: 2024-02-28
Attending: STUDENT IN AN ORGANIZED HEALTH CARE EDUCATION/TRAINING PROGRAM
Payer: MEDICAID

## 2024-02-28 VITALS
BODY MASS INDEX: 30.17 KG/M2 | DIASTOLIC BLOOD PRESSURE: 80 MMHG | OXYGEN SATURATION: 97 % | TEMPERATURE: 98.1 F | HEIGHT: 68 IN | RESPIRATION RATE: 16 BRPM | HEART RATE: 88 BPM | SYSTOLIC BLOOD PRESSURE: 131 MMHG | WEIGHT: 199.08 LBS

## 2024-02-28 DIAGNOSIS — S93.402A SPRAIN OF LEFT ANKLE, UNSPECIFIED LIGAMENT, INITIAL ENCOUNTER: Primary | ICD-10-CM

## 2024-02-28 DIAGNOSIS — S39.012A STRAIN OF LUMBAR REGION, INITIAL ENCOUNTER: ICD-10-CM

## 2024-02-28 LAB — HCG UR QL: NEGATIVE

## 2024-02-28 PROCEDURE — 73610 X-RAY EXAM OF ANKLE: CPT

## 2024-02-28 PROCEDURE — 6370000000 HC RX 637 (ALT 250 FOR IP): Performed by: STUDENT IN AN ORGANIZED HEALTH CARE EDUCATION/TRAINING PROGRAM

## 2024-02-28 PROCEDURE — 99284 EMERGENCY DEPT VISIT MOD MDM: CPT

## 2024-02-28 PROCEDURE — 96374 THER/PROPH/DIAG INJ IV PUSH: CPT

## 2024-02-28 PROCEDURE — 6360000002 HC RX W HCPCS: Performed by: STUDENT IN AN ORGANIZED HEALTH CARE EDUCATION/TRAINING PROGRAM

## 2024-02-28 PROCEDURE — 81025 URINE PREGNANCY TEST: CPT

## 2024-02-28 PROCEDURE — 72131 CT LUMBAR SPINE W/O DYE: CPT

## 2024-02-28 RX ORDER — HYDROMORPHONE HYDROCHLORIDE 1 MG/ML
1 INJECTION, SOLUTION INTRAMUSCULAR; INTRAVENOUS; SUBCUTANEOUS
Status: COMPLETED | OUTPATIENT
Start: 2024-02-28 | End: 2024-02-28

## 2024-02-28 RX ORDER — OXYCODONE HYDROCHLORIDE AND ACETAMINOPHEN 5; 325 MG/1; MG/1
1 TABLET ORAL EVERY 6 HOURS PRN
Qty: 12 TABLET | Refills: 0 | Status: SHIPPED | OUTPATIENT
Start: 2024-02-28 | End: 2024-03-02

## 2024-02-28 RX ORDER — OXYCODONE HYDROCHLORIDE AND ACETAMINOPHEN 5; 325 MG/1; MG/1
1 TABLET ORAL
Status: COMPLETED | OUTPATIENT
Start: 2024-02-28 | End: 2024-02-28

## 2024-02-28 RX ADMIN — OXYCODONE AND ACETAMINOPHEN 1 TABLET: 5; 325 TABLET ORAL at 13:13

## 2024-02-28 RX ADMIN — HYDROMORPHONE HYDROCHLORIDE 1 MG: 1 INJECTION, SOLUTION INTRAMUSCULAR; INTRAVENOUS; SUBCUTANEOUS at 11:06

## 2024-02-28 ASSESSMENT — PAIN SCALES - GENERAL
PAINLEVEL_OUTOF10: 10
PAINLEVEL_OUTOF10: 8
PAINLEVEL_OUTOF10: 10
PAINLEVEL_OUTOF10: 10

## 2024-02-28 ASSESSMENT — PAIN DESCRIPTION - FREQUENCY
FREQUENCY: CONTINUOUS
FREQUENCY: CONTINUOUS

## 2024-02-28 ASSESSMENT — PAIN - FUNCTIONAL ASSESSMENT
PAIN_FUNCTIONAL_ASSESSMENT: PREVENTS OR INTERFERES SOME ACTIVE ACTIVITIES AND ADLS
PAIN_FUNCTIONAL_ASSESSMENT: PREVENTS OR INTERFERES SOME ACTIVE ACTIVITIES AND ADLS
PAIN_FUNCTIONAL_ASSESSMENT: 0-10

## 2024-02-28 ASSESSMENT — PAIN DESCRIPTION - LOCATION
LOCATION: LEG
LOCATION: LEG;BACK
LOCATION: CHEST

## 2024-02-28 ASSESSMENT — ENCOUNTER SYMPTOMS: BACK PAIN: 1

## 2024-02-28 ASSESSMENT — PAIN DESCRIPTION - ONSET
ONSET: ON-GOING
ONSET: SUDDEN

## 2024-02-28 ASSESSMENT — PAIN DESCRIPTION - ORIENTATION
ORIENTATION: LEFT

## 2024-02-28 ASSESSMENT — PAIN DESCRIPTION - DESCRIPTORS
DESCRIPTORS: SHARP
DESCRIPTORS: DISCOMFORT;SHARP

## 2024-02-28 ASSESSMENT — PAIN DESCRIPTION - PAIN TYPE
TYPE: ACUTE PAIN
TYPE: ACUTE PAIN

## 2024-02-28 NOTE — DISCHARGE INSTRUCTIONS
Stay off of the left leg until cleared by orthopedics.    RETURN IF WORSENING PAIN, CHANGE IN COLOR, CHANGE IN TEMPERATURE, OR LOSS OF SENSATION IN THE AFFECTED EXTREMITY    USE CAUTION TAKING THE PAIN MEDICATION AS IT CAN CAUSE DROWSINESS--NO DRIVING WHILE TAKING IT. DO NOT MIX IT WITH TYLENOL AS IT HAS TYLENOL IN IT

## 2024-02-28 NOTE — ED NOTES
Patient given crutches, she returned demonstration on how to safely use. Patient brought out to car and assisted into vehicle without difficulty. 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 Visitor to drive patient home.

## 2024-02-28 NOTE — ED PROVIDER NOTES
concerning features such as numbness, incontinence, etc). This was neg for acute process, showing only mild degenerative changes. Plan for home with pain meds. Crutches given, advised NWB and close ortho followup.     Problems Addressed:  Sprain of left ankle, unspecified ligament, initial encounter: acute illness or injury  Strain of lumbar region, initial encounter: acute illness or injury    Amount and/or Complexity of Data Reviewed  Labs: ordered. Decision-making details documented in ED Course.  Radiology: ordered and independent interpretation performed. Decision-making details documented in ED Course.    Risk  Prescription drug management.  Parenteral controlled substances.          FINAL IMPRESSION      1. Sprain of left ankle, unspecified ligament, initial encounter    2. Strain of lumbar region, initial encounter          DISPOSITION/PLAN   DISPOSITION Decision To Discharge 02/28/2024 02:53:02 PM          (Please note that portions of this note were completed with a voice recognition program.  Efforts were made to edit the dictations but occasionally words are mis-transcribed.)    Marky Jiménez DO (electronically signed)  Emergency Attending Physician               Marky Jiménez DO  02/28/24 9300

## 2024-02-28 NOTE — ED TRIAGE NOTES
Pt comes to ED from work via EMS with reports of GLF in the bathroom. Pt has noted deformity to the left ankle. Pt also reports lower back pain.

## 2024-02-28 NOTE — ED NOTES
1250: EMT and RN at bedside to apply splint to patient's left lower leg.   1313: Patient up to bedside commode with 2 assist to give UA for CT.

## 2024-07-11 ENCOUNTER — OFFICE VISIT (OUTPATIENT)
Age: 34
End: 2024-07-11
Payer: MEDICAID

## 2024-07-11 VITALS
HEART RATE: 65 BPM | RESPIRATION RATE: 20 BRPM | SYSTOLIC BLOOD PRESSURE: 108 MMHG | BODY MASS INDEX: 38.22 KG/M2 | HEIGHT: 68 IN | DIASTOLIC BLOOD PRESSURE: 74 MMHG | TEMPERATURE: 97.2 F | WEIGHT: 252.2 LBS | OXYGEN SATURATION: 100 %

## 2024-07-11 DIAGNOSIS — G89.29 CHRONIC MIDLINE LOW BACK PAIN WITH LEFT-SIDED SCIATICA: Primary | ICD-10-CM

## 2024-07-11 DIAGNOSIS — M54.42 CHRONIC MIDLINE LOW BACK PAIN WITH LEFT-SIDED SCIATICA: Primary | ICD-10-CM

## 2024-07-11 DIAGNOSIS — L20.84 INTRINSIC ECZEMA: ICD-10-CM

## 2024-07-11 PROCEDURE — 99214 OFFICE O/P EST MOD 30 MIN: CPT | Performed by: INTERNAL MEDICINE

## 2024-07-11 RX ORDER — TRIAMCINOLONE ACETONIDE 1 MG/G
OINTMENT TOPICAL 2 TIMES DAILY
Qty: 1 EACH | Refills: 2 | Status: SHIPPED | OUTPATIENT
Start: 2024-07-11 | End: 2024-07-18

## 2024-07-11 RX ORDER — PREGABALIN 75 MG/1
75 CAPSULE ORAL 3 TIMES DAILY
Qty: 90 CAPSULE | Refills: 3 | Status: SHIPPED | OUTPATIENT
Start: 2024-07-11 | End: 2024-11-08

## 2024-07-11 ASSESSMENT — PATIENT HEALTH QUESTIONNAIRE - PHQ9
SUM OF ALL RESPONSES TO PHQ QUESTIONS 1-9: 0
SUM OF ALL RESPONSES TO PHQ9 QUESTIONS 1 & 2: 0
SUM OF ALL RESPONSES TO PHQ QUESTIONS 1-9: 0
1. LITTLE INTEREST OR PLEASURE IN DOING THINGS: NOT AT ALL
SUM OF ALL RESPONSES TO PHQ QUESTIONS 1-9: 0
SUM OF ALL RESPONSES TO PHQ QUESTIONS 1-9: 0
2. FEELING DOWN, DEPRESSED OR HOPELESS: NOT AT ALL

## 2024-07-11 NOTE — PROGRESS NOTES
\"Have you been to the ER, urgent care clinic since your last visit?  Hospitalized since your last visit?\"    NO    “Have you seen or consulted any other health care providers outside of [facility] since your last visit?”    NO            Click Here for Release of Records Request  
well-managed, and she has discontinued the use of pantoprazole.    She is not currently using any form of birth control.Not sexually active    She has albuterol on hand for use as needed.    She was previously prescribed Lyrica for her back pain, which she found beneficial. However, she has run out of the medication due to its sedative effects. Her back pain radiates down her left leg. Having increased pain and requesting to resume lyrica        Review of Systems     10 systems reviewed and negative other then HPI  Reviewed social history, medical history, family history and allergies no changes     Current Outpatient Medications   Medication Sig Dispense Refill    pregabalin (LYRICA) 75 MG capsule Take 1 capsule by mouth 3 times daily for 120 days. Max Daily Amount: 225 mg 90 capsule 3    triamcinolone (KENALOG) 0.1 % ointment Apply topically 2 times daily for 7 days 1 each 2    albuterol sulfate HFA (VENTOLIN HFA) 108 (90 Base) MCG/ACT inhaler Inhale 2 puffs into the lungs 4 times daily as needed for Wheezing 54 g 0     No current facility-administered medications for this visit.      Physical exam  General:  Well appearing adult no acute distress  HEENT:   PERRL,normal conjunctiva. External ear and canals normal, TMs normal.  Hearing normal to voice.  Nose without edema or discharge, normal septum.  Lips, teeth, gums normal.  Oropharynx: no erythema, no exudates, no lesions, normal tongue.  Neck:  Supple. Thyroid normal size, nontender, without nodules.  No carotid bruit. No masses or lymphadenopathy  Respiratory: no respiratory distress,  no wheezing, no rhonchi, no rales. No chest wall tenderness.  Cardiovascular:  RRR, normal S1S2, no murmur.    Gastrointestinal: normal bowel sounds, soft, nontender, without masses.  No hepatosplenomegaly.  Extremities +2 pulses, no edema, normal sensation   Musculoskeletal:  Normal gait. Normal digits and nails.  Normal strength and tone, no atrophy, and no abnormal

## 2024-08-13 ENCOUNTER — PATIENT MESSAGE (OUTPATIENT)
Age: 34
End: 2024-08-13

## 2024-08-13 DIAGNOSIS — Z11.1 SCREENING-PULMONARY TB: Primary | ICD-10-CM

## 2024-08-13 NOTE — TELEPHONE ENCOUNTER
Please advise if pt can come in to do TB test for work without an appointment to see you. Pt last seen 7/11/24

## 2024-08-16 ENCOUNTER — LAB (OUTPATIENT)
Age: 34
End: 2024-08-16

## 2024-08-16 DIAGNOSIS — Z11.1 SCREENING-PULMONARY TB: ICD-10-CM

## 2024-08-19 LAB
GAMMA INTERFERON BACKGROUND BLD IA-ACNC: 0.09 IU/ML
M TB IFN-G BLD-IMP: NEGATIVE
M TB IFN-G CD4+ BCKGRND COR BLD-ACNC: 0.08 IU/ML
M TB IFN-G CD4+CD8+ BCKGRND COR BLD-ACNC: 0.07 IU/ML
MITOGEN IGNF BCKGRD COR BLD-ACNC: >10 IU/ML
SERVICE CMNT-IMP: NORMAL

## 2024-11-18 ENCOUNTER — HOSPITAL ENCOUNTER (EMERGENCY)
Facility: HOSPITAL | Age: 34
Discharge: HOME | End: 2024-11-18
Attending: STUDENT IN AN ORGANIZED HEALTH CARE EDUCATION/TRAINING PROGRAM
Payer: MEDICAID

## 2024-11-18 VITALS
SYSTOLIC BLOOD PRESSURE: 134 MMHG | RESPIRATION RATE: 20 BRPM | DIASTOLIC BLOOD PRESSURE: 90 MMHG | TEMPERATURE: 98.6 F | HEIGHT: 68 IN | OXYGEN SATURATION: 99 % | WEIGHT: 250 LBS | HEART RATE: 63 BPM | BODY MASS INDEX: 37.89 KG/M2

## 2024-11-18 DIAGNOSIS — J02.9 ACUTE PHARYNGITIS, UNSPECIFIED ETIOLOGY: Primary | ICD-10-CM

## 2024-11-18 LAB — S PYO DNA THROAT QL NAA+PROBE: NOT DETECTED

## 2024-11-18 PROCEDURE — 6360000002 HC RX W HCPCS: Performed by: STUDENT IN AN ORGANIZED HEALTH CARE EDUCATION/TRAINING PROGRAM

## 2024-11-18 PROCEDURE — 99283 EMERGENCY DEPT VISIT LOW MDM: CPT

## 2024-11-18 PROCEDURE — 87651 STREP A DNA AMP PROBE: CPT

## 2024-11-18 RX ORDER — DEXAMETHASONE 4 MG/1
10 TABLET ORAL ONCE
Status: COMPLETED | OUTPATIENT
Start: 2024-11-18 | End: 2024-11-18

## 2024-11-18 RX ADMIN — DEXAMETHASONE 10 MG: 4 TABLET ORAL at 07:38

## 2024-11-18 ASSESSMENT — PAIN DESCRIPTION - ORIENTATION: ORIENTATION: INNER

## 2024-11-18 ASSESSMENT — PAIN DESCRIPTION - PAIN TYPE: TYPE: ACUTE PAIN

## 2024-11-18 ASSESSMENT — PAIN DESCRIPTION - DESCRIPTORS: DESCRIPTORS: SORE

## 2024-11-18 ASSESSMENT — PAIN - FUNCTIONAL ASSESSMENT: PAIN_FUNCTIONAL_ASSESSMENT: 0-10

## 2024-11-18 ASSESSMENT — PAIN SCALES - GENERAL: PAINLEVEL_OUTOF10: 10

## 2024-11-18 ASSESSMENT — PAIN DESCRIPTION - LOCATION: LOCATION: THROAT

## 2024-11-18 NOTE — ED NOTES
Discharge instructions were given to the patient by Kalyn THORPE. The patient left the Emergency Department ambulatory, alert and oriented and in no acute distress with 0 prescriptions. The patient was encouraged to call or return to the ED for worsening issues or problems and was encouraged to schedule a follow up appointment for continuing care. The patient verbalized understanding of discharge instructions and prescriptions, all questions were answered. The patient has no further concerns at this time.

## 2024-11-18 NOTE — ED PROVIDER NOTES
EMERGENCY DEPARTMENT HISTORY AND PHYSICAL EXAM      Date: 11/18/2024  Patient Name: Telma Cano    History of Presenting Illness     Chief Complaint   Patient presents with    Pharyngitis         HPI: History From: patient, History limited by: none  Telma Cano, 34 y.o. female presents to the ED with cc of sore throat.  Over the past 2 days, she reports sore throat, it hurts to swallow.  She works at a .  She has had a cough intermittently productive of green sputum.  No fevers, no diarrhea, no shortness of breath except with coughing.  She denies past medical history.          There are no other complaints, changes, or physical findings at this time.    PCP: Unique Casper MD    No current facility-administered medications on file prior to encounter.     Current Outpatient Medications on File Prior to Encounter   Medication Sig Dispense Refill    pregabalin (LYRICA) 75 MG capsule Take 1 capsule by mouth 3 times daily for 120 days. Max Daily Amount: 225 mg 90 capsule 3    albuterol sulfate HFA (VENTOLIN HFA) 108 (90 Base) MCG/ACT inhaler Inhale 2 puffs into the lungs 4 times daily as needed for Wheezing 54 g 0       Past History     Past Medical History:  Past Medical History:   Diagnosis Date    Anemia     ASCUS of cervix with negative high risk HPV 09/17/2023    Costochondritis        Past Surgical History:  Past Surgical History:   Procedure Laterality Date    ORTHOPEDIC SURGERY      Right fourth finger, \"Digit widgit\"       Family History:  Family History   Problem Relation Age of Onset    Diabetes Mother     Hypertension Father        Social History:  Social History     Tobacco Use    Smoking status: Former    Smokeless tobacco: Never   Substance Use Topics    Alcohol use: No    Drug use: No       Allergies:  Allergies   Allergen Reactions    Nsaids Hives    Ibuprofen Hives         Physical Exam   Physical Exam  Constitutional:       General: She is not in acute distress.     Appearance:

## 2024-11-18 NOTE — ED NOTES
Pt presents to ED ambulatory complaining of sore throat and nasal congestion x few days. Pt reports pain when swallowing. Pt also endorses nausea and vomiting that occurred 10 min prior to arrival in ED. Pt reports she works at a  is unaware of any exposure to covid, flu, or strep. Pt states taking Tylenol at 1000 pm last night, no relief. Pt is alert and oriented x 4, RR even and unlabored, skin is warm and dry. Assessment completed and pt updated on plan of care.  Call bell in reach.        Emergency Department Nursing Plan of Care       The Nursing Plan of Care is developed from the Nursing assessment and Emergency Department Attending provider initial evaluation.  The plan of care may be reviewed in the “ED Provider note”.    The Plan of Care was developed with the following considerations:   Patient / Family readiness to learn indicated by:verbalized understanding  Persons(s) to be included in education: patient  Barriers to Learning/Limitations:None    Signed

## 2025-01-13 SDOH — ECONOMIC STABILITY: FOOD INSECURITY: WITHIN THE PAST 12 MONTHS, THE FOOD YOU BOUGHT JUST DIDN'T LAST AND YOU DIDN'T HAVE MONEY TO GET MORE.: NEVER TRUE

## 2025-01-13 SDOH — ECONOMIC STABILITY: TRANSPORTATION INSECURITY
IN THE PAST 12 MONTHS, HAS THE LACK OF TRANSPORTATION KEPT YOU FROM MEDICAL APPOINTMENTS OR FROM GETTING MEDICATIONS?: NO

## 2025-01-13 SDOH — ECONOMIC STABILITY: FOOD INSECURITY: WITHIN THE PAST 12 MONTHS, YOU WORRIED THAT YOUR FOOD WOULD RUN OUT BEFORE YOU GOT MONEY TO BUY MORE.: NEVER TRUE

## 2025-01-13 SDOH — ECONOMIC STABILITY: INCOME INSECURITY: IN THE LAST 12 MONTHS, WAS THERE A TIME WHEN YOU WERE NOT ABLE TO PAY THE MORTGAGE OR RENT ON TIME?: YES

## 2025-01-13 SDOH — ECONOMIC STABILITY: TRANSPORTATION INSECURITY
IN THE PAST 12 MONTHS, HAS LACK OF TRANSPORTATION KEPT YOU FROM MEETINGS, WORK, OR FROM GETTING THINGS NEEDED FOR DAILY LIVING?: NO

## 2025-01-14 ENCOUNTER — OFFICE VISIT (OUTPATIENT)
Age: 35
End: 2025-01-14
Payer: MEDICAID

## 2025-01-14 VITALS
HEART RATE: 81 BPM | OXYGEN SATURATION: 100 % | DIASTOLIC BLOOD PRESSURE: 81 MMHG | SYSTOLIC BLOOD PRESSURE: 130 MMHG | TEMPERATURE: 98.1 F | BODY MASS INDEX: 35.98 KG/M2 | WEIGHT: 237.4 LBS | RESPIRATION RATE: 20 BRPM | HEIGHT: 68 IN

## 2025-01-14 DIAGNOSIS — R87.610 ATYPICAL SQUAMOUS CELLS OF UNDETERMINED SIGNIFICANCE ON CYTOLOGIC SMEAR OF CERVIX (ASC-US): ICD-10-CM

## 2025-01-14 DIAGNOSIS — J45.20 MILD INTERMITTENT ASTHMA WITHOUT COMPLICATION: ICD-10-CM

## 2025-01-14 DIAGNOSIS — Z13.1 SCREENING FOR DIABETES MELLITUS: ICD-10-CM

## 2025-01-14 DIAGNOSIS — G89.29 CHRONIC MIDLINE LOW BACK PAIN WITH LEFT-SIDED SCIATICA: ICD-10-CM

## 2025-01-14 DIAGNOSIS — M54.42 CHRONIC MIDLINE LOW BACK PAIN WITH LEFT-SIDED SCIATICA: ICD-10-CM

## 2025-01-14 DIAGNOSIS — Z23 NEEDS FLU SHOT: Primary | ICD-10-CM

## 2025-01-14 DIAGNOSIS — Z11.3 SCREENING EXAMINATION FOR STD (SEXUALLY TRANSMITTED DISEASE): ICD-10-CM

## 2025-01-14 DIAGNOSIS — Z13.220 SCREENING CHOLESTEROL LEVEL: ICD-10-CM

## 2025-01-14 PROCEDURE — 99214 OFFICE O/P EST MOD 30 MIN: CPT | Performed by: INTERNAL MEDICINE

## 2025-01-14 PROCEDURE — PBSHW INFLUENZA, FLUCELVAX TRIVALENT, (AGE 6 MO+) IM, PRESERVATIVE FREE, 0.5ML: Performed by: INTERNAL MEDICINE

## 2025-01-14 PROCEDURE — 90661 CCIIV3 VAC ABX FR 0.5 ML IM: CPT | Performed by: INTERNAL MEDICINE

## 2025-01-14 RX ORDER — ALBUTEROL SULFATE 90 UG/1
2 INHALANT RESPIRATORY (INHALATION) 4 TIMES DAILY PRN
Qty: 54 G | Refills: 0 | Status: SHIPPED | OUTPATIENT
Start: 2025-01-14

## 2025-01-14 RX ORDER — PREGABALIN 75 MG/1
75 CAPSULE ORAL 3 TIMES DAILY
Qty: 90 CAPSULE | Refills: 3 | Status: SHIPPED | OUTPATIENT
Start: 2025-01-14 | End: 2025-05-14

## 2025-01-14 ASSESSMENT — PATIENT HEALTH QUESTIONNAIRE - PHQ9
SUM OF ALL RESPONSES TO PHQ9 QUESTIONS 1 & 2: 0
1. LITTLE INTEREST OR PLEASURE IN DOING THINGS: NOT AT ALL
2. FEELING DOWN, DEPRESSED OR HOPELESS: NOT AT ALL
SUM OF ALL RESPONSES TO PHQ QUESTIONS 1-9: 0

## 2025-01-14 NOTE — PATIENT INSTRUCTIONS
[facility] Utility - Financial Resources*  (Call United Way/211 if need more resources.)  Utilities  Hum  What they offer: Partnership with the [facility] of . Assist with finding and applying for government funded programs and benefits. You can also update your benefits or report changes through Hum.  Website: https://www.[employer]/  Phone Number: 8335CALLVA (712-217-5967)    [employer]  What they offer: EnergyShare is [facility]'s energy assistance program of last resort for anyone who faces financial hardships from unemployment or family crisis.  Phone Number: 163.964.2860  Address: [address] [place] [address]  Website: https://www.Center'd/[place]/billing/billing-options/energyshare    Energy Assistance Programs (EAP) - [facility] of   What they offer: EAP assists low-income households in meeting their immediate home energy needs.      Website: https://www.[employer].[place].gov/benefit/ea/  Available assistance:   Crisis Assistance - Heating Emergencies  Fuel Assistance - Offset Heating Fuel Costs  Cooling Assistance - Applies to Cooling Utility Bills and Equipment  How to apply:  Online:  https://Channel BreezevirginiaZurff/  Call:  743.145.5578   Paper application:   Print application from https://www.[employer].[place].gov/benefit/ea/ and submit to your [facility] Department of     [facility] Department of   [facility] of  contacts: https://www.[facility].[place].[facility]/localagency/index.cgi  [place], [place] Utility Affordability Programs  https://[employer].gov/public-utilities/billing  Call:  104.268.8671   Email:  [place]@a.gov  Programs available:  Equal Monthly Payment Plan, [employer] Heat Program, [employer] Water Program, [employer] Water Conservation Program, Senior Assistance, Other Fuel Assistance Programs, PromisePay Payment Plans, Low-Income Household Water

## 2025-01-14 NOTE — PROGRESS NOTES
After obtaining consent, and per verbal order from Unique Casper MD, patient received influenza vaccine given in  Left deltoid  Influenza Vaccine 0.5 mL IM now. Patient was observed for 10 minutes post injection. Patient tolerated injection well.\"Have you been to the ER, urgent care clinic since your last visit?  Hospitalized since your last visit?\"    Yes, ER for pharyngitits    “Have you seen or consulted any other health care providers outside our system since your last visit?”    NO

## 2025-01-14 NOTE — PROGRESS NOTES
Telma Cano (:  1990) is a 34 y.o. female, Established patient, here for evaluation of the following chief complaint(s):  Kidney Problem (Frequent urination/Low back pain bilateral) and Exposure to STD (3 months ago)         Assessment & Plan  1. Atypical squamous cells of undetermined significance (ASCUS).  She had ASCUS on her Pap smear in 2023 and has not followed up with a gynecologist. A repeat Pap smear will be conducted today. If the results are abnormal, a referral to a gynecologist will be made. A urine test for sexually transmitted diseases (STDs) will also be performed due to possible exposure from unprotected intercourse.    2. Hidradenitis suppurativa.  She presents with hidradenitis suppurativa, currently at stage 1 and not severe. The lesions do not appear infected. She has been advised to continue her efforts towards weight loss and maintain smoking cessation. Educational materials on hidradenitis suppurativa have been provided.    3. Mild intermittent asthma.  Her asthma is currently mild and intermittent.    4. Back pain.  Her back pain has improved significantly with the use of Lyrica. A prescription refill for Lyrica has been provided. She takes it daily but sometimes skips doses when working due to drowsiness.    5. Health maintenance.  Screening for diabetes and kidney and liver function tests will be conducted.    Follow-up  The patient will follow up in 1 year for her annual exam.    Results  Laboratory Studies  ASCUS on Pap smear in 2023.  1. Needs flu shot  -     Influenza, FLUCELVAX Trivalent, (age 6 mo+) IM, Preservative Free, 0.5mL  2. Chronic midline low back pain with left-sided sciatica  -     pregabalin (LYRICA) 75 MG capsule; Take 1 capsule by mouth 3 times daily for 120 days. Max Daily Amount: 225 mg, Disp-90 capsule, R-3Normal  -     Comprehensive Metabolic Panel; Future  -     CBC with Auto Differential; Future  3. Screening examination for

## 2025-01-16 LAB
ALBUMIN SERPL-MCNC: 3.9 G/DL (ref 3.5–5)
ALBUMIN/GLOB SERPL: 1.1 (ref 1.1–2.2)
ALP SERPL-CCNC: 74 U/L (ref 45–117)
ALT SERPL-CCNC: 28 U/L (ref 12–78)
ANION GAP SERPL CALC-SCNC: 9 MMOL/L (ref 2–12)
AST SERPL-CCNC: 22 U/L (ref 15–37)
BASOPHILS # BLD: 0.04 K/UL (ref 0–0.1)
BASOPHILS NFR BLD: 0.5 % (ref 0–1)
BILIRUB SERPL-MCNC: 0.6 MG/DL (ref 0.2–1)
BUN SERPL-MCNC: 8 MG/DL (ref 6–20)
BUN/CREAT SERPL: 9 (ref 12–20)
CALCIUM SERPL-MCNC: 9.3 MG/DL (ref 8.5–10.1)
CHLORIDE SERPL-SCNC: 104 MMOL/L (ref 97–108)
CHOLEST SERPL-MCNC: 167 MG/DL
CO2 SERPL-SCNC: 25 MMOL/L (ref 21–32)
CREAT SERPL-MCNC: 0.88 MG/DL (ref 0.55–1.02)
DIFFERENTIAL METHOD BLD: NORMAL
EOSINOPHIL # BLD: 0.09 K/UL (ref 0–0.4)
EOSINOPHIL NFR BLD: 1.1 % (ref 0–7)
ERYTHROCYTE [DISTWIDTH] IN BLOOD BY AUTOMATED COUNT: 12.2 % (ref 11.5–14.5)
EST. AVERAGE GLUCOSE BLD GHB EST-MCNC: 105 MG/DL
GLOBULIN SER CALC-MCNC: 3.5 G/DL (ref 2–4)
GLUCOSE SERPL-MCNC: 102 MG/DL (ref 65–100)
HBA1C MFR BLD: 5.3 % (ref 4–5.6)
HBV SURFACE AG SER QL: <0.1 INDEX
HBV SURFACE AG SER QL: NEGATIVE
HCT VFR BLD AUTO: 37.9 % (ref 35–47)
HDLC SERPL-MCNC: 43 MG/DL
HDLC SERPL: 3.9 (ref 0–5)
HGB BLD-MCNC: 12.4 G/DL (ref 11.5–16)
HIV 1+2 AB+HIV1 P24 AG SERPL QL IA: NONREACTIVE
HIV 1/2 RESULT COMMENT: NORMAL
IMM GRANULOCYTES # BLD AUTO: 0.02 K/UL (ref 0–0.04)
IMM GRANULOCYTES NFR BLD AUTO: 0.2 % (ref 0–0.5)
LDLC SERPL CALC-MCNC: 109.4 MG/DL (ref 0–100)
LYMPHOCYTES # BLD: 3.07 K/UL (ref 0.8–3.5)
LYMPHOCYTES NFR BLD: 37.4 % (ref 12–49)
MCH RBC QN AUTO: 26.5 PG (ref 26–34)
MCHC RBC AUTO-ENTMCNC: 32.7 G/DL (ref 30–36.5)
MCV RBC AUTO: 81 FL (ref 80–99)
MONOCYTES # BLD: 0.61 K/UL (ref 0–1)
MONOCYTES NFR BLD: 7.4 % (ref 5–13)
NEUTS SEG # BLD: 4.37 K/UL (ref 1.8–8)
NEUTS SEG NFR BLD: 53.4 % (ref 32–75)
NRBC # BLD: 0 K/UL (ref 0–0.01)
NRBC BLD-RTO: 0 PER 100 WBC
PLATELET # BLD AUTO: 330 K/UL (ref 150–400)
PMV BLD AUTO: 10.4 FL (ref 8.9–12.9)
POTASSIUM SERPL-SCNC: 3.6 MMOL/L (ref 3.5–5.1)
PROT SERPL-MCNC: 7.4 G/DL (ref 6.4–8.2)
RBC # BLD AUTO: 4.68 M/UL (ref 3.8–5.2)
SODIUM SERPL-SCNC: 138 MMOL/L (ref 136–145)
TRIGL SERPL-MCNC: 73 MG/DL
VLDLC SERPL CALC-MCNC: 14.6 MG/DL
WBC # BLD AUTO: 8.2 K/UL (ref 3.6–11)

## 2025-01-20 ENCOUNTER — HOSPITAL ENCOUNTER (EMERGENCY)
Facility: HOSPITAL | Age: 35
Discharge: HOME | End: 2025-01-20
Attending: STUDENT IN AN ORGANIZED HEALTH CARE EDUCATION/TRAINING PROGRAM
Payer: MEDICAID

## 2025-01-20 VITALS
TEMPERATURE: 97.3 F | SYSTOLIC BLOOD PRESSURE: 125 MMHG | BODY MASS INDEX: 35.58 KG/M2 | OXYGEN SATURATION: 99 % | RESPIRATION RATE: 16 BRPM | DIASTOLIC BLOOD PRESSURE: 67 MMHG | WEIGHT: 234 LBS | HEART RATE: 98 BPM

## 2025-01-20 DIAGNOSIS — U07.1 COVID-19: Primary | ICD-10-CM

## 2025-01-20 DIAGNOSIS — M54.50 ACUTE BILATERAL LOW BACK PAIN WITHOUT SCIATICA: ICD-10-CM

## 2025-01-20 LAB
ALBUMIN SERPL-MCNC: 3.5 G/DL (ref 3.5–5)
ALBUMIN/GLOB SERPL: 1 (ref 1.1–2.2)
ALP SERPL-CCNC: 69 U/L (ref 45–117)
ALT SERPL-CCNC: 29 U/L (ref 12–78)
ANION GAP SERPL CALC-SCNC: 8 MMOL/L (ref 2–12)
APPEARANCE UR: ABNORMAL
AST SERPL-CCNC: 19 U/L (ref 15–37)
BACTERIA URNS QL MICRO: NEGATIVE /HPF
BASOPHILS # BLD: 0.04 K/UL (ref 0–0.1)
BASOPHILS NFR BLD: 0.5 % (ref 0–1)
BILIRUB SERPL-MCNC: 0.8 MG/DL (ref 0.2–1)
BILIRUB UR QL: NEGATIVE
BUN SERPL-MCNC: 8 MG/DL (ref 6–20)
BUN/CREAT SERPL: 9 (ref 12–20)
CALCIUM SERPL-MCNC: 8.7 MG/DL (ref 8.5–10.1)
CHLORIDE SERPL-SCNC: 104 MMOL/L (ref 97–108)
CO2 SERPL-SCNC: 27 MMOL/L (ref 21–32)
COLOR UR: ABNORMAL
CREAT SERPL-MCNC: 0.86 MG/DL (ref 0.55–1.02)
DIFFERENTIAL METHOD BLD: NORMAL
EOSINOPHIL # BLD: 0.1 K/UL (ref 0–0.4)
EOSINOPHIL NFR BLD: 1.2 % (ref 0–7)
EPITH CASTS URNS QL MICRO: ABNORMAL /LPF
ERYTHROCYTE [DISTWIDTH] IN BLOOD BY AUTOMATED COUNT: 12.4 % (ref 11.5–14.5)
FLUAV RNA SPEC QL NAA+PROBE: NOT DETECTED
FLUBV RNA SPEC QL NAA+PROBE: NOT DETECTED
GLOBULIN SER CALC-MCNC: 3.5 G/DL (ref 2–4)
GLUCOSE SERPL-MCNC: 96 MG/DL (ref 65–100)
GLUCOSE UR STRIP.AUTO-MCNC: NEGATIVE MG/DL
HCG UR QL: NEGATIVE
HCT VFR BLD AUTO: 38.5 % (ref 35–47)
HGB BLD-MCNC: 12.7 G/DL (ref 11.5–16)
HGB UR QL STRIP: NEGATIVE
IMM GRANULOCYTES # BLD AUTO: 0.03 K/UL (ref 0–0.04)
IMM GRANULOCYTES NFR BLD AUTO: 0.4 % (ref 0–0.5)
KETONES UR QL STRIP.AUTO: ABNORMAL MG/DL
LEUKOCYTE ESTERASE UR QL STRIP.AUTO: ABNORMAL
LYMPHOCYTES # BLD: 1.51 K/UL (ref 0.8–3.5)
LYMPHOCYTES NFR BLD: 18.4 % (ref 12–49)
MCH RBC QN AUTO: 26.6 PG (ref 26–34)
MCHC RBC AUTO-ENTMCNC: 33 G/DL (ref 30–36.5)
MCV RBC AUTO: 80.7 FL (ref 80–99)
MONOCYTES # BLD: 0.53 K/UL (ref 0–1)
MONOCYTES NFR BLD: 6.5 % (ref 5–13)
NEUTS SEG # BLD: 5.99 K/UL (ref 1.8–8)
NEUTS SEG NFR BLD: 73 % (ref 32–75)
NITRITE UR QL STRIP.AUTO: NEGATIVE
NRBC # BLD: 0 K/UL (ref 0–0.01)
NRBC BLD-RTO: 0 PER 100 WBC
PH UR STRIP: 5.5 (ref 5–8)
PLATELET # BLD AUTO: 282 K/UL (ref 150–400)
PMV BLD AUTO: 9.7 FL (ref 8.9–12.9)
POTASSIUM SERPL-SCNC: 3.9 MMOL/L (ref 3.5–5.1)
PROT SERPL-MCNC: 7 G/DL (ref 6.4–8.2)
PROT UR STRIP-MCNC: ABNORMAL MG/DL
RBC # BLD AUTO: 4.77 M/UL (ref 3.8–5.2)
RBC #/AREA URNS HPF: ABNORMAL /HPF (ref 0–5)
SARS-COV-2 RNA RESP QL NAA+PROBE: DETECTED
SODIUM SERPL-SCNC: 139 MMOL/L (ref 136–145)
SOURCE: ABNORMAL
SP GR UR REFRACTOMETRY: 1.03 (ref 1–1.03)
URINE CULTURE IF INDICATED: ABNORMAL
UROBILINOGEN UR QL STRIP.AUTO: 1 EU/DL (ref 0.2–1)
WBC # BLD AUTO: 8.2 K/UL (ref 3.6–11)
WBC URNS QL MICRO: ABNORMAL /HPF (ref 0–4)

## 2025-01-20 PROCEDURE — 87636 SARSCOV2 & INF A&B AMP PRB: CPT

## 2025-01-20 PROCEDURE — 80053 COMPREHEN METABOLIC PANEL: CPT

## 2025-01-20 PROCEDURE — 36415 COLL VENOUS BLD VENIPUNCTURE: CPT

## 2025-01-20 PROCEDURE — 81025 URINE PREGNANCY TEST: CPT

## 2025-01-20 PROCEDURE — 81001 URINALYSIS AUTO W/SCOPE: CPT

## 2025-01-20 PROCEDURE — 6370000000 HC RX 637 (ALT 250 FOR IP): Performed by: STUDENT IN AN ORGANIZED HEALTH CARE EDUCATION/TRAINING PROGRAM

## 2025-01-20 PROCEDURE — 85025 COMPLETE CBC W/AUTO DIFF WBC: CPT

## 2025-01-20 PROCEDURE — 99283 EMERGENCY DEPT VISIT LOW MDM: CPT

## 2025-01-20 RX ORDER — ONDANSETRON 4 MG/1
4 TABLET, ORALLY DISINTEGRATING ORAL ONCE
Status: COMPLETED | OUTPATIENT
Start: 2025-01-20 | End: 2025-01-20

## 2025-01-20 RX ORDER — OXYCODONE AND ACETAMINOPHEN 5; 325 MG/1; MG/1
1 TABLET ORAL
Status: COMPLETED | OUTPATIENT
Start: 2025-01-20 | End: 2025-01-20

## 2025-01-20 RX ADMIN — ONDANSETRON 4 MG: 4 TABLET, ORALLY DISINTEGRATING ORAL at 09:06

## 2025-01-20 RX ADMIN — OXYCODONE HYDROCHLORIDE AND ACETAMINOPHEN 1 TABLET: 5; 325 TABLET ORAL at 09:06

## 2025-01-20 ASSESSMENT — PAIN SCALES - GENERAL
PAINLEVEL_OUTOF10: 10
PAINLEVEL_OUTOF10: 10

## 2025-01-20 ASSESSMENT — PAIN DESCRIPTION - ORIENTATION
ORIENTATION: LOWER
ORIENTATION: RIGHT;LEFT

## 2025-01-20 ASSESSMENT — PAIN DESCRIPTION - DESCRIPTORS
DESCRIPTORS: ACHING
DESCRIPTORS: STABBING;BURNING

## 2025-01-20 ASSESSMENT — PAIN DESCRIPTION - LOCATION
LOCATION: BACK
LOCATION: BACK

## 2025-01-20 ASSESSMENT — PAIN - FUNCTIONAL ASSESSMENT: PAIN_FUNCTIONAL_ASSESSMENT: 0-10

## 2025-01-20 NOTE — ED PROVIDER NOTES
your doctor about these medications      albuterol sulfate  (90 Base) MCG/ACT inhaler  Commonly known as: Ventolin HFA  Inhale 2 puffs into the lungs 4 times daily as needed for Wheezing     pregabalin 75 MG capsule  Commonly known as: Lyrica  Take 1 capsule by mouth 3 times daily for 120 days. Max Daily Amount: 225 mg                DISCONTINUED MEDICATIONS:  Current Discharge Medication List          I am the Primary Clinician of Record.   Paul Desir MD (electronically signed)    (Please note that parts of this dictation were completed with voice recognition software. Quite often unanticipated grammatical, syntax, homophones, and other interpretive errors are inadvertently transcribed by the computer software. Please disregards these errors. Please excuse any errors that have escaped final proofreading.)         Paul Desir MD  01/20/25 1580

## 2025-01-20 NOTE — PROGRESS NOTES
Spiritual Care Partner Volunteer visited patient at [facility] in [facility] EMERGENCY DEPARTMENT on 1/20/2025   Documented by:  M[facility]  To contact the  call: [phone] ([phone])

## 2025-01-21 LAB
CYTOLOGIST CVX/VAG CYTO: NORMAL
CYTOLOGY CVX/VAG DOC CYTO: NORMAL
CYTOLOGY CVX/VAG DOC THIN PREP: NORMAL
DX ICD CODE: NORMAL
HPV GENOTYPE REFLEX: NORMAL
HPV I/H RISK 4 DNA CVX QL PROBE+SIG AMP: NEGATIVE
Lab: NORMAL
OTHER STN SPEC: NORMAL
STAT OF ADQ CVX/VAG CYTO-IMP: NORMAL

## 2025-03-02 ENCOUNTER — HOSPITAL ENCOUNTER (EMERGENCY)
Facility: HOSPITAL | Age: 35
Discharge: HOME | End: 2025-03-02
Payer: MEDICAID

## 2025-03-02 VITALS
HEART RATE: 66 BPM | RESPIRATION RATE: 22 BRPM | SYSTOLIC BLOOD PRESSURE: 117 MMHG | TEMPERATURE: 98.4 F | BODY MASS INDEX: 35.61 KG/M2 | HEIGHT: 68 IN | WEIGHT: 235 LBS | OXYGEN SATURATION: 100 % | DIASTOLIC BLOOD PRESSURE: 81 MMHG

## 2025-03-02 DIAGNOSIS — K08.89 DENTALGIA: ICD-10-CM

## 2025-03-02 DIAGNOSIS — R68.84 JAW PAIN: Primary | ICD-10-CM

## 2025-03-02 PROCEDURE — 6370000000 HC RX 637 (ALT 250 FOR IP)

## 2025-03-02 PROCEDURE — 99283 EMERGENCY DEPT VISIT LOW MDM: CPT

## 2025-03-02 PROCEDURE — 93005 ELECTROCARDIOGRAM TRACING: CPT | Performed by: STUDENT IN AN ORGANIZED HEALTH CARE EDUCATION/TRAINING PROGRAM

## 2025-03-02 RX ORDER — OXYCODONE AND ACETAMINOPHEN 5; 325 MG/1; MG/1
1 TABLET ORAL EVERY 8 HOURS PRN
Qty: 9 TABLET | Refills: 0 | Status: SHIPPED | OUTPATIENT
Start: 2025-03-02 | End: 2025-03-05

## 2025-03-02 RX ORDER — ACETAMINOPHEN 500 MG
1000 TABLET ORAL
Status: COMPLETED | OUTPATIENT
Start: 2025-03-02 | End: 2025-03-02

## 2025-03-02 RX ORDER — ACETAMINOPHEN 500 MG
500 TABLET ORAL 4 TIMES DAILY PRN
Qty: 28 TABLET | Refills: 0 | Status: SHIPPED | OUTPATIENT
Start: 2025-03-02 | End: 2025-03-09

## 2025-03-02 RX ADMIN — DIPHENHYDRAMINE HCL ORAL: 25 SOLUTION ORAL at 16:26

## 2025-03-02 RX ADMIN — ACETAMINOPHEN 1000 MG: 500 TABLET, FILM COATED ORAL at 16:26

## 2025-03-02 RX ADMIN — AMOXICILLIN AND CLAVULANATE POTASSIUM 1 TABLET: 875; 125 TABLET, FILM COATED ORAL at 16:26

## 2025-03-02 ASSESSMENT — PAIN DESCRIPTION - LOCATION
LOCATION: JAW
LOCATION: JAW

## 2025-03-02 ASSESSMENT — PAIN SCALES - GENERAL
PAINLEVEL_OUTOF10: 10
PAINLEVEL_OUTOF10: 10

## 2025-03-02 ASSESSMENT — PAIN DESCRIPTION - ORIENTATION
ORIENTATION: RIGHT
ORIENTATION: RIGHT

## 2025-03-02 ASSESSMENT — PAIN - FUNCTIONAL ASSESSMENT: PAIN_FUNCTIONAL_ASSESSMENT: ACTIVITIES ARE NOT PREVENTED

## 2025-03-02 ASSESSMENT — PAIN DESCRIPTION - DESCRIPTORS
DESCRIPTORS: SHARP
DESCRIPTORS: ACHING

## 2025-03-02 NOTE — ED PROVIDER NOTES
[facility] EMERGENCY DEPARTMENT  EMERGENCY DEPARTMENT ENCOUNTER       Pt Name: Telma Cano  MRN: [id number]  Birthdate 1990  Date of evaluation: 3/2/2025  Provider: Maryam Berumen PA-C   PCP: Unique Casper MD  Note Started: 4:54 PM EST 3/2/25     CHIEF COMPLAINT       Chief Complaint   Patient presents with    Jaw Pain     Pt ambulatory with cc of ongoing right sided jaw pain x2 weeks. Pt states unable to fully open mouth d/t pain. No dental problems noted in TR. Pt states worse when she eats, feels as if she's clenching her jaw. No CP SOB.         HISTORY OF PRESENT ILLNESS: 1 or more elements      History From: Patient  HPI Limitations: None     Telma Cano is a 34 y.o. female who presents ambulatory to the emergency department for evaluation of right upper dental pain with radiation into the jaw x 2 weeks.  Patient reports pain with opening mouth wide, denies any known history of dental problems however states that symptoms are worse when eating or applying pressure to the right upper teeth.  Patient does not know the last time she saw dentist, denies any known fevers.  Denies any chest pains or shortness of breath today.     Nursing Notes were all reviewed and agreed with or any disagreements were addressed in the HPI.     REVIEW OF SYSTEMS      Review of Systems     Positives and Pertinent negatives as per HPI.    PAST HISTORY     Past Medical History:  Past Medical History:   Diagnosis Date    Anemia     ASCUS of cervix with negative high risk HPV 09/17/2023    Costochondritis        Past Surgical History:  Past Surgical History:   Procedure Laterality Date    ORTHOPEDIC SURGERY      Right fourth finger, \"Digit widgit\"       Family History:  Family History   Problem Relation Age of Onset    Diabetes Mother     Hypertension Father        Social History:  Social History     Tobacco Use    Smoking status: Former    Smokeless tobacco: Never   Substance Use Topics    Alcohol use: No

## 2025-03-02 NOTE — DISCHARGE INSTRUCTIONS
Emergency Dental Care     [facility]  [address]; [place], [place] [address]  Open M-F: 8AM - 5PM  Main Phone: [phone]  Pediatric Phone: [phone]  $70 for Emergency Care  $60 for first routine care, then pay by sliding scale based upon income.    [facility]  [address]; [place] [address]  Phone: [phone]    [employer]  [facility]: [address]; [place] [address]  [facility]: [address] [place] [address]; [place] [address]  Phone: [phone]  Open Monday - Friday 8AM-4:30 PM  Emergency Walk-In Care: Monday-Thursday 8 AM-12 PM    [facility]  [facility], [facility], [address] [place] [address], 1st Floor  First Come First Service starting at 8:30 AM M-F  Phone: [phone], press 2  Fee: $150 per tooth (x-ray & extractions only)  Pediatrics Phone: [phone], 8-5 M-F    [facility]  [facility], [facility], [address] [place] [address], 2nd Floor, [address]; [place], [place]  First Come First Service starting at 8:30 AM-3 PM M - F    [facility]  [address] [place] [address]  Phone: [phone] or [phone]  Emergency Hours: 9:30AM - 11AM (extractions)  Simple tooth extraction $ per tooth. $75 for x-ray    [facility]  [address] [place] [address]  [facility] Residents only, over the age of 18  Phone: [phone]. Leave message saying you need an appointment to register.  Hours: Tuesday Evenings

## 2025-03-03 LAB
EKG ATRIAL RATE: 66 BPM
EKG DIAGNOSIS: NORMAL
EKG P AXIS: -16 DEGREES
EKG P-R INTERVAL: 142 MS
EKG Q-T INTERVAL: 374 MS
EKG QRS DURATION: 76 MS
EKG QTC CALCULATION (BAZETT): 392 MS
EKG R AXIS: 4 DEGREES
EKG T AXIS: 39 DEGREES
EKG VENTRICULAR RATE: 66 BPM

## 2025-03-20 ENCOUNTER — OFFICE VISIT (OUTPATIENT)
Age: 35
End: 2025-03-20
Payer: MEDICAID

## 2025-03-20 VITALS
OXYGEN SATURATION: 100 % | HEART RATE: 81 BPM | WEIGHT: 235 LBS | TEMPERATURE: 98 F | BODY MASS INDEX: 35.61 KG/M2 | SYSTOLIC BLOOD PRESSURE: 117 MMHG | HEIGHT: 68 IN | RESPIRATION RATE: 18 BRPM | DIASTOLIC BLOOD PRESSURE: 81 MMHG

## 2025-03-20 DIAGNOSIS — R94.31 ABNORMAL EKG: Primary | ICD-10-CM

## 2025-03-20 PROCEDURE — 99213 OFFICE O/P EST LOW 20 MIN: CPT | Performed by: INTERNAL MEDICINE

## 2025-03-20 PROCEDURE — 93005 ELECTROCARDIOGRAM TRACING: CPT | Performed by: INTERNAL MEDICINE

## 2025-03-20 RX ORDER — LIDOCAINE HYDROCHLORIDE 5 MG/ML
50 INJECTION, SOLUTION INFILTRATION; PERINEURAL ONCE
COMMUNITY

## 2025-03-20 SDOH — ECONOMIC STABILITY: FOOD INSECURITY: WITHIN THE PAST 12 MONTHS, YOU WORRIED THAT YOUR FOOD WOULD RUN OUT BEFORE YOU GOT MONEY TO BUY MORE.: PATIENT DECLINED

## 2025-03-20 SDOH — ECONOMIC STABILITY: FOOD INSECURITY: WITHIN THE PAST 12 MONTHS, THE FOOD YOU BOUGHT JUST DIDN'T LAST AND YOU DIDN'T HAVE MONEY TO GET MORE.: PATIENT DECLINED

## 2025-03-20 ASSESSMENT — PATIENT HEALTH QUESTIONNAIRE - PHQ9
1. LITTLE INTEREST OR PLEASURE IN DOING THINGS: NOT AT ALL
SUM OF ALL RESPONSES TO PHQ QUESTIONS 1-9: 0
2. FEELING DOWN, DEPRESSED OR HOPELESS: NOT AT ALL
SUM OF ALL RESPONSES TO PHQ QUESTIONS 1-9: 0

## 2025-03-20 NOTE — PROGRESS NOTES
\"Have you been to the ER, urgent care clinic since your last visit?  Hospitalized since your last visit?\"    Yes, ER for jaw pain    “Have you seen or consulted any other health care providers outside our system since your last visit?”    NO

## 2025-03-20 NOTE — PROGRESS NOTES
Telma Cano (:  1990) is a 34 y.o. female, Established patient, here for evaluation of the following chief complaint(s):  Jaw Pain ([place] teeth pain)         Assessment & Plan  1. Abnormal EKG.  The patient went to the emergency room for dental pain and had an EKG done on 2025, which showed an abnormality with Q waves in V1 and V2, raising concerns for a possible septal infarct. However, it is suspected that the abnormality was due to incorrect lead placement. A repeat EKG was performed today, showing no Q waves in V1 or V2, confirming the suspicion of lead placement error.    2. Dental pain.  She is scheduled to get her wisdom teeth out.    3. Back pain.  Her back is doing well. MRI of the back showed central protrusion at L4-L5 with some stenosis and degenerative change. She is currently taking Lyrica but adjusts the timing to avoid interference with her job at the .    Results  Laboratory Studies  Blood work in 2025 showed no anemia and normal kidney and liver function.    Imaging  MRI of the back showed central protrusion at L4-L5 with some stenosis and degenerative change.    Testing  EKG done on 2025 showed some Q waves in leads V1 and V2, raising concerns about a possible septal infarct. Repeat EKG showed no more Q wave in lead V1 or V2.  1. Abnormal EKG  -     EKG 12 lead    No follow-ups on file.       Subjective   History of Present Illness  The patient presents for evaluation of an abnormal EKG.    She sought emergency care due to dental pain, during which an EKG was performed on 2025, revealing an abnormality. The EKG showed some Q waves in leads V1 and V2, raising concerns about a possible septal infarct. She denies chest pain. No symptoms     She is ready to get her wisdom teeth out - scheduled with dentist     Her back is doing well. She takes Lyrica at night when she gets off work because she can not function at the  while on it. She has